# Patient Record
Sex: FEMALE | Race: WHITE | NOT HISPANIC OR LATINO | Employment: FULL TIME | URBAN - METROPOLITAN AREA
[De-identification: names, ages, dates, MRNs, and addresses within clinical notes are randomized per-mention and may not be internally consistent; named-entity substitution may affect disease eponyms.]

---

## 2017-02-08 ENCOUNTER — OFFICE VISIT - HEALTHEAST (OUTPATIENT)
Dept: FAMILY MEDICINE | Facility: CLINIC | Age: 42
End: 2017-02-08

## 2017-02-08 DIAGNOSIS — R10.13 EPIGASTRIC PAIN: ICD-10-CM

## 2017-02-08 DIAGNOSIS — Z72.0 TOBACCO USE: ICD-10-CM

## 2017-02-08 DIAGNOSIS — N64.4 BREAST PAIN: ICD-10-CM

## 2017-02-09 ENCOUNTER — COMMUNICATION - HEALTHEAST (OUTPATIENT)
Dept: FAMILY MEDICINE | Facility: CLINIC | Age: 42
End: 2017-02-09

## 2017-02-13 LAB
GLIADIN IGA SER-ACNC: 0.7 U/ML
GLIADIN IGG SER-ACNC: <0.4 U/ML
IGA SERPL-MCNC: 145 MG/DL (ref 65–400)
TTG IGA SER-ACNC: 0.5 U/ML
TTG IGG SER-ACNC: <0.6 U/ML

## 2017-02-24 ENCOUNTER — HOSPITAL ENCOUNTER (OUTPATIENT)
Dept: MAMMOGRAPHY | Facility: HOSPITAL | Age: 42
Discharge: HOME | End: 2017-02-24
Attending: FAMILY MEDICINE

## 2017-02-24 DIAGNOSIS — Z12.31 VISIT FOR SCREENING MAMMOGRAM: ICD-10-CM

## 2017-03-03 ENCOUNTER — HOSPITAL ENCOUNTER (OUTPATIENT)
Dept: MAMMOGRAPHY | Facility: HOSPITAL | Age: 42
Discharge: HOME | End: 2017-03-03
Attending: FAMILY MEDICINE

## 2017-03-03 DIAGNOSIS — R92.0 MICROCALCIFICATION OF LEFT BREAST ON MAMMOGRAM: ICD-10-CM

## 2017-03-06 ENCOUNTER — OFFICE VISIT - HEALTHEAST (OUTPATIENT)
Dept: FAMILY MEDICINE | Facility: CLINIC | Age: 42
End: 2017-03-06

## 2017-03-06 DIAGNOSIS — Z80.3 FAMILY HISTORY OF BREAST CANCER: ICD-10-CM

## 2017-03-06 DIAGNOSIS — R07.9 CHEST PAIN: ICD-10-CM

## 2017-03-06 DIAGNOSIS — F17.200 NICOTINE DEPENDENCE: ICD-10-CM

## 2017-03-08 ENCOUNTER — COMMUNICATION - HEALTHEAST (OUTPATIENT)
Dept: PULMONOLOGY | Facility: OTHER | Age: 42
End: 2017-03-08

## 2017-03-09 ENCOUNTER — COMMUNICATION - HEALTHEAST (OUTPATIENT)
Dept: ONCOLOGY | Facility: HOSPITAL | Age: 42
End: 2017-03-09

## 2017-03-13 ENCOUNTER — HOSPITAL ENCOUNTER (OUTPATIENT)
Dept: CARDIOLOGY | Facility: HOSPITAL | Age: 42
Discharge: HOME | End: 2017-03-13
Attending: FAMILY MEDICINE

## 2017-03-13 DIAGNOSIS — R07.9 CHEST PAIN: ICD-10-CM

## 2017-03-13 LAB
CV STRESS CURRENT BP HE: NORMAL
CV STRESS CURRENT HR HE: 100
CV STRESS CURRENT HR HE: 103
CV STRESS CURRENT HR HE: 106
CV STRESS CURRENT HR HE: 111
CV STRESS CURRENT HR HE: 113
CV STRESS CURRENT HR HE: 113
CV STRESS CURRENT HR HE: 114
CV STRESS CURRENT HR HE: 114
CV STRESS CURRENT HR HE: 118
CV STRESS CURRENT HR HE: 122
CV STRESS CURRENT HR HE: 135
CV STRESS CURRENT HR HE: 138
CV STRESS CURRENT HR HE: 142
CV STRESS CURRENT HR HE: 143
CV STRESS CURRENT HR HE: 155
CV STRESS CURRENT HR HE: 162
CV STRESS CURRENT HR HE: 163
CV STRESS CURRENT HR HE: 164
CV STRESS CURRENT HR HE: 66
CV STRESS CURRENT HR HE: 79
CV STRESS CURRENT HR HE: 82
CV STRESS CURRENT HR HE: 86
CV STRESS CURRENT HR HE: 87
CV STRESS CURRENT HR HE: 88
CV STRESS CURRENT HR HE: 89
CV STRESS CURRENT HR HE: 91
CV STRESS CURRENT HR HE: 94
CV STRESS CURRENT HR HE: 96
CV STRESS CURRENT HR HE: 97
CV STRESS CURRENT HR HE: 98
CV STRESS DEVIATION TIME HE: NORMAL
CV STRESS ECHO PERCENT HR HE: NORMAL
CV STRESS EXERCISE STAGE HE: NORMAL
CV STRESS FINAL RESTING BP HE: NORMAL
CV STRESS FINAL RESTING HR HE: 82
CV STRESS MAX HR HE: 164
CV STRESS MAX TREADMILL GRADE HE: 16
CV STRESS MAX TREADMILL SPEED HE: 4.2
CV STRESS PEAK DIA BP HE: NORMAL
CV STRESS PEAK SYS BP HE: NORMAL
CV STRESS PHASE HE: NORMAL
CV STRESS PROTOCOL HE: NORMAL
CV STRESS RESTING PT POSITION HE: NORMAL
CV STRESS RESTING PT POSITION HE: NORMAL
CV STRESS ST DEVIATION AMOUNT HE: NORMAL
CV STRESS ST DEVIATION ELEVATION HE: NORMAL
CV STRESS ST EVELATION AMOUNT HE: NORMAL
CV STRESS TEST TYPE HE: NORMAL
CV STRESS TOTAL STAGE TIME MIN 1 HE: NORMAL
STRESS ECHO BASELINE BP: NORMAL
STRESS ECHO BASELINE HR: 66
STRESS ECHO CALCULATED PERCENT HR: 91 %
STRESS ECHO LAST STRESS BP: NORMAL
STRESS ECHO LAST STRESS HR: 163
STRESS ECHO POST ESTIMATED WORKLOAD: 11.7
STRESS ECHO POST EXERCISE DUR MIN: 10
STRESS ECHO POST EXERCISE DUR SEC: 0
STRESS ECHO TARGET HR: 153

## 2017-03-15 ENCOUNTER — OFFICE VISIT - HEALTHEAST (OUTPATIENT)
Dept: PULMONOLOGY | Facility: OTHER | Age: 42
End: 2017-03-15

## 2017-03-15 DIAGNOSIS — R06.00 DYSPNEA: ICD-10-CM

## 2017-03-15 ASSESSMENT — MIFFLIN-ST. JEOR: SCORE: 1311.76

## 2017-04-03 ENCOUNTER — RECORDS - HEALTHEAST (OUTPATIENT)
Dept: ADMINISTRATIVE | Facility: OTHER | Age: 42
End: 2017-04-03

## 2017-04-03 ENCOUNTER — COMMUNICATION - HEALTHEAST (OUTPATIENT)
Dept: ONCOLOGY | Facility: HOSPITAL | Age: 42
End: 2017-04-03

## 2017-04-03 ENCOUNTER — OFFICE VISIT - HEALTHEAST (OUTPATIENT)
Dept: ONCOLOGY | Facility: HOSPITAL | Age: 42
End: 2017-04-03

## 2017-04-03 DIAGNOSIS — Z80.3 FAMILY HISTORY OF BREAST CANCER: ICD-10-CM

## 2017-04-07 ENCOUNTER — COMMUNICATION - HEALTHEAST (OUTPATIENT)
Dept: ONCOLOGY | Facility: HOSPITAL | Age: 42
End: 2017-04-07

## 2017-07-05 ENCOUNTER — OFFICE VISIT - HEALTHEAST (OUTPATIENT)
Dept: FAMILY MEDICINE | Facility: CLINIC | Age: 42
End: 2017-07-05

## 2017-07-05 ENCOUNTER — COMMUNICATION - HEALTHEAST (OUTPATIENT)
Dept: FAMILY MEDICINE | Facility: CLINIC | Age: 42
End: 2017-07-05

## 2017-07-05 DIAGNOSIS — G43.909 MIGRAINE: ICD-10-CM

## 2017-08-25 ENCOUNTER — COMMUNICATION - HEALTHEAST (OUTPATIENT)
Dept: FAMILY MEDICINE | Facility: CLINIC | Age: 42
End: 2017-08-25

## 2017-08-25 DIAGNOSIS — N64.4 BREAST PAIN: ICD-10-CM

## 2017-08-26 ENCOUNTER — COMMUNICATION - HEALTHEAST (OUTPATIENT)
Dept: FAMILY MEDICINE | Facility: CLINIC | Age: 42
End: 2017-08-26

## 2017-09-07 ENCOUNTER — OFFICE VISIT - HEALTHEAST (OUTPATIENT)
Dept: FAMILY MEDICINE | Facility: CLINIC | Age: 42
End: 2017-09-07

## 2017-09-07 DIAGNOSIS — F39 MOOD DISORDER (H): ICD-10-CM

## 2017-09-07 DIAGNOSIS — Z23 NEED FOR IMMUNIZATION AGAINST INFLUENZA: ICD-10-CM

## 2017-09-07 DIAGNOSIS — G47.00 INSOMNIA: ICD-10-CM

## 2017-09-07 DIAGNOSIS — F41.9 ANXIETY: ICD-10-CM

## 2017-10-02 ENCOUNTER — COMMUNICATION - HEALTHEAST (OUTPATIENT)
Dept: FAMILY MEDICINE | Facility: CLINIC | Age: 42
End: 2017-10-02

## 2017-10-03 ENCOUNTER — COMMUNICATION - HEALTHEAST (OUTPATIENT)
Dept: SCHEDULING | Facility: CLINIC | Age: 42
End: 2017-10-03

## 2017-10-03 DIAGNOSIS — F39 MOOD DISORDER (H): ICD-10-CM

## 2017-10-03 DIAGNOSIS — F41.9 ANXIETY: ICD-10-CM

## 2017-10-03 DIAGNOSIS — G47.00 INSOMNIA: ICD-10-CM

## 2017-10-19 ENCOUNTER — COMMUNICATION - HEALTHEAST (OUTPATIENT)
Dept: FAMILY MEDICINE | Facility: CLINIC | Age: 42
End: 2017-10-19

## 2017-10-19 DIAGNOSIS — F10.10 ALCOHOL ABUSE: ICD-10-CM

## 2017-11-06 ENCOUNTER — OFFICE VISIT - HEALTHEAST (OUTPATIENT)
Dept: FAMILY MEDICINE | Facility: CLINIC | Age: 42
End: 2017-11-06

## 2017-11-06 DIAGNOSIS — N92.6 IRREGULAR MENSES: ICD-10-CM

## 2017-11-08 ENCOUNTER — HOSPITAL ENCOUNTER (OUTPATIENT)
Dept: ULTRASOUND IMAGING | Facility: HOSPITAL | Age: 42
Discharge: HOME | End: 2017-11-08
Attending: FAMILY MEDICINE

## 2017-11-08 DIAGNOSIS — N92.6 IRREGULAR MENSES: ICD-10-CM

## 2017-11-15 ENCOUNTER — COMMUNICATION - HEALTHEAST (OUTPATIENT)
Dept: FAMILY MEDICINE | Facility: CLINIC | Age: 42
End: 2017-11-15

## 2017-11-15 DIAGNOSIS — N64.4 BREAST PAIN: ICD-10-CM

## 2017-11-15 DIAGNOSIS — F10.10 ALCOHOL ABUSE: ICD-10-CM

## 2017-12-12 ENCOUNTER — COMMUNICATION - HEALTHEAST (OUTPATIENT)
Dept: FAMILY MEDICINE | Facility: CLINIC | Age: 42
End: 2017-12-12

## 2017-12-12 DIAGNOSIS — G43.909 MIGRAINE: ICD-10-CM

## 2017-12-13 ENCOUNTER — RECORDS - HEALTHEAST (OUTPATIENT)
Dept: ADMINISTRATIVE | Facility: OTHER | Age: 42
End: 2017-12-13

## 2017-12-14 ENCOUNTER — COMMUNICATION - HEALTHEAST (OUTPATIENT)
Dept: FAMILY MEDICINE | Facility: CLINIC | Age: 42
End: 2017-12-14

## 2017-12-14 DIAGNOSIS — F10.10 ALCOHOL ABUSE: ICD-10-CM

## 2017-12-23 ENCOUNTER — COMMUNICATION - HEALTHEAST (OUTPATIENT)
Dept: FAMILY MEDICINE | Facility: CLINIC | Age: 42
End: 2017-12-23

## 2017-12-23 DIAGNOSIS — F39 MOOD DISORDER (H): ICD-10-CM

## 2017-12-23 DIAGNOSIS — F41.9 ANXIETY: ICD-10-CM

## 2017-12-23 DIAGNOSIS — G47.00 INSOMNIA: ICD-10-CM

## 2018-01-03 ENCOUNTER — OFFICE VISIT - HEALTHEAST (OUTPATIENT)
Dept: FAMILY MEDICINE | Facility: CLINIC | Age: 43
End: 2018-01-03

## 2018-01-03 DIAGNOSIS — F10.90 ALCOHOL USE DISORDER: ICD-10-CM

## 2018-01-03 DIAGNOSIS — R41.0 DELIRIUM: ICD-10-CM

## 2018-01-03 LAB
ALBUMIN SERPL-MCNC: 3.9 G/DL (ref 3.5–5)
ALP SERPL-CCNC: 55 U/L (ref 45–120)
ALT SERPL W P-5'-P-CCNC: 35 U/L (ref 0–45)
ANION GAP SERPL CALCULATED.3IONS-SCNC: 7 MMOL/L (ref 5–18)
AST SERPL W P-5'-P-CCNC: 36 U/L (ref 0–40)
BILIRUB SERPL-MCNC: 0.4 MG/DL (ref 0–1)
BUN SERPL-MCNC: 10 MG/DL (ref 8–22)
CALCIUM SERPL-MCNC: 9.9 MG/DL (ref 8.5–10.5)
CHLORIDE BLD-SCNC: 102 MMOL/L (ref 98–107)
CO2 SERPL-SCNC: 26 MMOL/L (ref 22–31)
CREAT SERPL-MCNC: 0.7 MG/DL (ref 0.6–1.1)
GFR SERPL CREATININE-BSD FRML MDRD: >60 ML/MIN/1.73M2
GLUCOSE BLD-MCNC: 77 MG/DL (ref 70–125)
POTASSIUM BLD-SCNC: 4.6 MMOL/L (ref 3.5–5)
PROT SERPL-MCNC: 7.3 G/DL (ref 6–8)
SODIUM SERPL-SCNC: 135 MMOL/L (ref 136–145)

## 2018-02-02 ENCOUNTER — COMMUNICATION - HEALTHEAST (OUTPATIENT)
Dept: FAMILY MEDICINE | Facility: CLINIC | Age: 43
End: 2018-02-02

## 2018-02-02 DIAGNOSIS — F10.90 ALCOHOL USE DISORDER: ICD-10-CM

## 2018-02-05 ENCOUNTER — OFFICE VISIT - HEALTHEAST (OUTPATIENT)
Dept: FAMILY MEDICINE | Facility: CLINIC | Age: 43
End: 2018-02-05

## 2018-02-05 ENCOUNTER — COMMUNICATION - HEALTHEAST (OUTPATIENT)
Dept: FAMILY MEDICINE | Facility: CLINIC | Age: 43
End: 2018-02-05

## 2018-02-05 DIAGNOSIS — F10.90 ALCOHOL USE DISORDER: ICD-10-CM

## 2018-02-05 DIAGNOSIS — G47.00 INSOMNIA: ICD-10-CM

## 2018-02-05 DIAGNOSIS — Z78.9 ALCOHOL USE: ICD-10-CM

## 2018-02-05 DIAGNOSIS — F41.9 ANXIETY: ICD-10-CM

## 2018-02-05 DIAGNOSIS — N92.6 IRREGULAR MENSES: ICD-10-CM

## 2018-02-05 DIAGNOSIS — G43.909 MIGRAINE: ICD-10-CM

## 2018-02-05 DIAGNOSIS — F39 MOOD DISORDER (H): ICD-10-CM

## 2018-02-13 ENCOUNTER — OFFICE VISIT - HEALTHEAST (OUTPATIENT)
Dept: FAMILY MEDICINE | Facility: CLINIC | Age: 43
End: 2018-02-13

## 2018-02-13 DIAGNOSIS — M79.10 MYALGIA: ICD-10-CM

## 2018-02-13 LAB
ALBUMIN SERPL-MCNC: 3.9 G/DL (ref 3.5–5)
ALP SERPL-CCNC: 60 U/L (ref 45–120)
ALT SERPL W P-5'-P-CCNC: 37 U/L (ref 0–45)
ANION GAP SERPL CALCULATED.3IONS-SCNC: 8 MMOL/L (ref 5–18)
AST SERPL W P-5'-P-CCNC: 35 U/L (ref 0–40)
BILIRUB SERPL-MCNC: 0.4 MG/DL (ref 0–1)
BUN SERPL-MCNC: 12 MG/DL (ref 8–22)
CALCIUM SERPL-MCNC: 9.3 MG/DL (ref 8.5–10.5)
CHLORIDE BLD-SCNC: 104 MMOL/L (ref 98–107)
CO2 SERPL-SCNC: 26 MMOL/L (ref 22–31)
CREAT SERPL-MCNC: 0.69 MG/DL (ref 0.6–1.1)
ERYTHROCYTE [DISTWIDTH] IN BLOOD BY AUTOMATED COUNT: 11 % (ref 11–14.5)
FOLATE SERPL-MCNC: 16.4 NG/ML
GFR SERPL CREATININE-BSD FRML MDRD: >60 ML/MIN/1.73M2
GLUCOSE BLD-MCNC: 108 MG/DL (ref 70–125)
HCT VFR BLD AUTO: 41.1 % (ref 35–47)
HGB BLD-MCNC: 13.7 G/DL (ref 12–16)
MAGNESIUM SERPL-MCNC: 2.3 MG/DL (ref 1.8–2.6)
MCH RBC QN AUTO: 32.5 PG (ref 27–34)
MCHC RBC AUTO-ENTMCNC: 33.2 G/DL (ref 32–36)
MCV RBC AUTO: 98 FL (ref 80–100)
PLATELET # BLD AUTO: 236 THOU/UL (ref 140–440)
PMV BLD AUTO: 7.3 FL (ref 7–10)
POTASSIUM BLD-SCNC: 4.3 MMOL/L (ref 3.5–5)
PROT SERPL-MCNC: 7.1 G/DL (ref 6–8)
RBC # BLD AUTO: 4.2 MILL/UL (ref 3.8–5.4)
SODIUM SERPL-SCNC: 138 MMOL/L (ref 136–145)
TSH SERPL DL<=0.005 MIU/L-ACNC: 0.76 UIU/ML (ref 0.3–5)
VIT B12 SERPL-MCNC: 458 PG/ML (ref 213–816)
WBC: 4.3 THOU/UL (ref 4–11)

## 2018-02-17 LAB — VIT B1 PYROPHOSHATE BLD-SCNC: 118 NMOL/L (ref 70–180)

## 2018-02-27 ENCOUNTER — OFFICE VISIT - HEALTHEAST (OUTPATIENT)
Dept: FAMILY MEDICINE | Facility: CLINIC | Age: 43
End: 2018-02-27

## 2018-02-27 DIAGNOSIS — B37.31 YEAST VAGINITIS: ICD-10-CM

## 2018-02-27 DIAGNOSIS — N92.6 ABNORMAL MENSES: ICD-10-CM

## 2018-02-27 DIAGNOSIS — Z12.4 CERVICAL CANCER SCREENING: ICD-10-CM

## 2018-02-27 DIAGNOSIS — N93.0 POSTCOITAL BLEEDING: ICD-10-CM

## 2018-02-27 LAB
CLUE CELLS: ABNORMAL
TRICHOMONAS, WET PREP: ABNORMAL
YEAST, WET PREP: ABNORMAL

## 2018-02-28 LAB
C TRACH DNA SPEC QL PROBE+SIG AMP: NEGATIVE
HPV SOURCE: ABNORMAL
HUMAN PAPILLOMA VIRUS 16 DNA: NEGATIVE
HUMAN PAPILLOMA VIRUS 18 DNA: NEGATIVE
HUMAN PAPILLOMA VIRUS FINAL DIAGNOSIS: ABNORMAL
HUMAN PAPILLOMA VIRUS OTHER HR: POSITIVE
N GONORRHOEA DNA SPEC QL NAA+PROBE: NEGATIVE
SPECIMEN DESCRIPTION: ABNORMAL

## 2018-03-04 ENCOUNTER — COMMUNICATION - HEALTHEAST (OUTPATIENT)
Dept: FAMILY MEDICINE | Facility: CLINIC | Age: 43
End: 2018-03-04

## 2018-03-04 DIAGNOSIS — F41.9 ANXIETY: ICD-10-CM

## 2018-03-04 DIAGNOSIS — Z78.9 ALCOHOL USE: ICD-10-CM

## 2018-03-04 DIAGNOSIS — G47.00 INSOMNIA: ICD-10-CM

## 2018-03-05 LAB
BKR LAB AP ABNORMAL BLEEDING: YES
BKR LAB AP BIRTH CONTROL/HORMONES: NORMAL
BKR LAB AP CERVICAL APPEARANCE: NORMAL
BKR LAB AP GYN ADEQUACY: NORMAL
BKR LAB AP GYN INTERPRETATION: NORMAL
BKR LAB AP HPV REFLEX: NORMAL
BKR LAB AP LMP: NORMAL
BKR LAB AP PATIENT STATUS: NORMAL
BKR LAB AP PREVIOUS ABNORMAL: NORMAL
BKR LAB AP PREVIOUS NORMAL: 2012
HIGH RISK?: NO
PATH REPORT.COMMENTS IMP SPEC: NORMAL
RESULT FLAG (HE HISTORICAL CONVERSION): NORMAL

## 2018-03-14 ENCOUNTER — RECORDS - HEALTHEAST (OUTPATIENT)
Dept: ADMINISTRATIVE | Facility: OTHER | Age: 43
End: 2018-03-14

## 2018-03-14 ENCOUNTER — COMMUNICATION - HEALTHEAST (OUTPATIENT)
Dept: FAMILY MEDICINE | Facility: CLINIC | Age: 43
End: 2018-03-14

## 2018-03-15 ENCOUNTER — AMBULATORY - HEALTHEAST (OUTPATIENT)
Dept: FAMILY MEDICINE | Facility: CLINIC | Age: 43
End: 2018-03-15

## 2018-03-15 DIAGNOSIS — Z78.9 ALCOHOL USE: ICD-10-CM

## 2018-03-15 DIAGNOSIS — F41.9 ANXIETY: ICD-10-CM

## 2018-04-03 ENCOUNTER — COMMUNICATION - HEALTHEAST (OUTPATIENT)
Dept: FAMILY MEDICINE | Facility: CLINIC | Age: 43
End: 2018-04-03

## 2018-04-03 DIAGNOSIS — F10.90 ALCOHOL USE DISORDER: ICD-10-CM

## 2018-04-16 ENCOUNTER — COMMUNICATION - HEALTHEAST (OUTPATIENT)
Dept: FAMILY MEDICINE | Facility: CLINIC | Age: 43
End: 2018-04-16

## 2018-04-16 DIAGNOSIS — F39 MOOD DISORDER (H): ICD-10-CM

## 2018-04-16 DIAGNOSIS — F41.9 ANXIETY: ICD-10-CM

## 2018-04-19 ENCOUNTER — OFFICE VISIT - HEALTHEAST (OUTPATIENT)
Dept: FAMILY MEDICINE | Facility: CLINIC | Age: 43
End: 2018-04-19

## 2018-04-19 DIAGNOSIS — F32.A DEPRESSION: ICD-10-CM

## 2018-04-19 DIAGNOSIS — Z78.9 ALCOHOL USE: ICD-10-CM

## 2018-04-19 DIAGNOSIS — F10.10 ETOH ABUSE: ICD-10-CM

## 2018-04-19 DIAGNOSIS — R30.9 PAINFUL URINATION: ICD-10-CM

## 2018-04-19 DIAGNOSIS — F41.9 ANXIETY: ICD-10-CM

## 2018-04-19 DIAGNOSIS — N92.0 MENORRHAGIA: ICD-10-CM

## 2018-04-19 LAB
BACTERIA #/AREA URNS HPF: ABNORMAL HPF
RBC #/AREA URNS AUTO: ABNORMAL HPF
SQUAMOUS #/AREA URNS AUTO: ABNORMAL LPF
WBC #/AREA URNS AUTO: ABNORMAL HPF
WBC CLUMPS #/AREA URNS HPF: PRESENT /[HPF]

## 2018-04-21 LAB — BACTERIA SPEC CULT: ABNORMAL

## 2018-05-03 ENCOUNTER — OFFICE VISIT - HEALTHEAST (OUTPATIENT)
Dept: FAMILY MEDICINE | Facility: CLINIC | Age: 43
End: 2018-05-03

## 2018-05-03 DIAGNOSIS — N10 ACUTE PYELONEPHRITIS: ICD-10-CM

## 2018-05-03 DIAGNOSIS — Z09 HOSPITAL DISCHARGE FOLLOW-UP: ICD-10-CM

## 2018-05-07 ENCOUNTER — COMMUNICATION - HEALTHEAST (OUTPATIENT)
Dept: FAMILY MEDICINE | Facility: CLINIC | Age: 43
End: 2018-05-07

## 2018-05-07 DIAGNOSIS — F10.90 ALCOHOL USE DISORDER: ICD-10-CM

## 2018-05-18 ENCOUNTER — COMMUNICATION - HEALTHEAST (OUTPATIENT)
Dept: FAMILY MEDICINE | Facility: CLINIC | Age: 43
End: 2018-05-18

## 2018-05-18 ENCOUNTER — AMBULATORY - HEALTHEAST (OUTPATIENT)
Dept: LAB | Facility: CLINIC | Age: 43
End: 2018-05-18

## 2018-05-18 DIAGNOSIS — N39.0 UTI (URINARY TRACT INFECTION): ICD-10-CM

## 2018-05-19 LAB — BACTERIA SPEC CULT: NO GROWTH

## 2018-06-06 ENCOUNTER — HOSPITAL ENCOUNTER (OUTPATIENT)
Dept: MAMMOGRAPHY | Facility: CLINIC | Age: 43
Discharge: HOME | End: 2018-06-06
Attending: FAMILY MEDICINE

## 2018-06-06 DIAGNOSIS — Z12.31 VISIT FOR SCREENING MAMMOGRAM: ICD-10-CM

## 2018-07-09 ENCOUNTER — COMMUNICATION - HEALTHEAST (OUTPATIENT)
Dept: FAMILY MEDICINE | Facility: CLINIC | Age: 43
End: 2018-07-09

## 2018-07-09 DIAGNOSIS — G43.909 MIGRAINE: ICD-10-CM

## 2018-07-09 DIAGNOSIS — F10.90 ALCOHOL USE DISORDER: ICD-10-CM

## 2018-07-13 ENCOUNTER — COMMUNICATION - HEALTHEAST (OUTPATIENT)
Dept: FAMILY MEDICINE | Facility: CLINIC | Age: 43
End: 2018-07-13

## 2018-07-13 DIAGNOSIS — G47.00 INSOMNIA: ICD-10-CM

## 2018-07-15 ENCOUNTER — COMMUNICATION - HEALTHEAST (OUTPATIENT)
Dept: FAMILY MEDICINE | Facility: CLINIC | Age: 43
End: 2018-07-15

## 2018-07-15 DIAGNOSIS — F10.90 ALCOHOL USE DISORDER: ICD-10-CM

## 2018-07-16 RX ORDER — SUMATRIPTAN 50 MG/1
TABLET, FILM COATED ORAL
Qty: 10 TABLET | Refills: 2 | Status: SHIPPED | OUTPATIENT
Start: 2018-07-16 | End: 2022-08-30

## 2018-08-12 ENCOUNTER — COMMUNICATION - HEALTHEAST (OUTPATIENT)
Dept: FAMILY MEDICINE | Facility: CLINIC | Age: 43
End: 2018-08-12

## 2018-08-12 DIAGNOSIS — G43.909 MIGRAINE: ICD-10-CM

## 2018-08-24 ENCOUNTER — COMMUNICATION - HEALTHEAST (OUTPATIENT)
Dept: FAMILY MEDICINE | Facility: CLINIC | Age: 43
End: 2018-08-24

## 2018-08-30 ENCOUNTER — COMMUNICATION - HEALTHEAST (OUTPATIENT)
Dept: FAMILY MEDICINE | Facility: CLINIC | Age: 43
End: 2018-08-30

## 2018-09-05 ENCOUNTER — OFFICE VISIT - HEALTHEAST (OUTPATIENT)
Dept: FAMILY MEDICINE | Facility: CLINIC | Age: 43
End: 2018-09-05

## 2018-09-05 DIAGNOSIS — J32.9 SINUSITIS: ICD-10-CM

## 2018-09-05 DIAGNOSIS — Z72.0 TOBACCO USE: ICD-10-CM

## 2018-09-05 DIAGNOSIS — F33.0 MILD RECURRENT MAJOR DEPRESSION (H): ICD-10-CM

## 2018-09-13 ENCOUNTER — COMMUNICATION - HEALTHEAST (OUTPATIENT)
Dept: FAMILY MEDICINE | Facility: CLINIC | Age: 43
End: 2018-09-13

## 2018-09-13 DIAGNOSIS — K29.70 GASTRITIS: ICD-10-CM

## 2018-09-13 DIAGNOSIS — H92.09 EAR PAIN: ICD-10-CM

## 2018-09-21 ENCOUNTER — OFFICE VISIT - HEALTHEAST (OUTPATIENT)
Dept: FAMILY MEDICINE | Facility: CLINIC | Age: 43
End: 2018-09-21

## 2018-09-21 ENCOUNTER — AMBULATORY - HEALTHEAST (OUTPATIENT)
Dept: FAMILY MEDICINE | Facility: CLINIC | Age: 43
End: 2018-09-21

## 2018-09-21 ENCOUNTER — HOSPITAL ENCOUNTER (OUTPATIENT)
Dept: ULTRASOUND IMAGING | Facility: CLINIC | Age: 43
Discharge: HOME | End: 2018-09-21
Attending: FAMILY MEDICINE

## 2018-09-21 DIAGNOSIS — E04.1 THYROID NODULE: ICD-10-CM

## 2018-09-21 DIAGNOSIS — J01.00 SUBACUTE MAXILLARY SINUSITIS: ICD-10-CM

## 2018-09-21 LAB — TSH SERPL DL<=0.005 MIU/L-ACNC: 1.47 UIU/ML (ref 0.3–5)

## 2018-09-21 RX ORDER — ALBUTEROL SULFATE 90 UG/1
AEROSOL, METERED RESPIRATORY (INHALATION)
Status: SHIPPED | COMMUNITY
Start: 2018-09-19 | End: 2022-08-30

## 2018-09-24 ENCOUNTER — COMMUNICATION - HEALTHEAST (OUTPATIENT)
Dept: FAMILY MEDICINE | Facility: CLINIC | Age: 43
End: 2018-09-24

## 2018-09-24 ENCOUNTER — COMMUNICATION - HEALTHEAST (OUTPATIENT)
Dept: ADMINISTRATIVE | Facility: CLINIC | Age: 43
End: 2018-09-24

## 2018-09-24 DIAGNOSIS — E04.1 THYROID NODULE: ICD-10-CM

## 2018-09-27 ENCOUNTER — OFFICE VISIT - HEALTHEAST (OUTPATIENT)
Dept: FAMILY MEDICINE | Facility: CLINIC | Age: 43
End: 2018-09-27

## 2018-09-27 ENCOUNTER — HOSPITAL ENCOUNTER (OUTPATIENT)
Dept: ULTRASOUND IMAGING | Facility: HOSPITAL | Age: 43
Discharge: HOME | End: 2018-09-27
Attending: FAMILY MEDICINE | Admitting: RADIOLOGY

## 2018-09-27 DIAGNOSIS — R91.1 LUNG NODULE: ICD-10-CM

## 2018-09-27 DIAGNOSIS — R06.09 EXERTIONAL DYSPNEA: ICD-10-CM

## 2018-09-27 DIAGNOSIS — E04.1 THYROID NODULE: ICD-10-CM

## 2018-09-27 DIAGNOSIS — F17.210 CIGARETTE SMOKER: ICD-10-CM

## 2018-10-01 ENCOUNTER — OFFICE VISIT - HEALTHEAST (OUTPATIENT)
Dept: AUDIOLOGY | Facility: CLINIC | Age: 43
End: 2018-10-01

## 2018-10-01 ENCOUNTER — OFFICE VISIT - HEALTHEAST (OUTPATIENT)
Dept: OTOLARYNGOLOGY | Facility: CLINIC | Age: 43
End: 2018-10-01

## 2018-10-01 DIAGNOSIS — H65.90 SEROUS OTITIS MEDIA: ICD-10-CM

## 2018-10-01 DIAGNOSIS — H90.11 CONDUCTIVE HEARING LOSS OF RIGHT EAR WITH UNRESTRICTED HEARING OF LEFT EAR: ICD-10-CM

## 2018-10-02 ENCOUNTER — AMBULATORY - HEALTHEAST (OUTPATIENT)
Dept: FAMILY MEDICINE | Facility: CLINIC | Age: 43
End: 2018-10-02

## 2018-10-02 ENCOUNTER — COMMUNICATION - HEALTHEAST (OUTPATIENT)
Dept: FAMILY MEDICINE | Facility: CLINIC | Age: 43
End: 2018-10-02

## 2018-10-02 DIAGNOSIS — C73 THYROID CANCER (H): ICD-10-CM

## 2018-10-03 ENCOUNTER — COMMUNICATION - HEALTHEAST (OUTPATIENT)
Dept: FAMILY MEDICINE | Facility: CLINIC | Age: 43
End: 2018-10-03

## 2018-10-03 ENCOUNTER — COMMUNICATION - HEALTHEAST (OUTPATIENT)
Dept: ADMINISTRATIVE | Facility: CLINIC | Age: 43
End: 2018-10-03

## 2018-10-08 ENCOUNTER — RECORDS - HEALTHEAST (OUTPATIENT)
Dept: ADMINISTRATIVE | Facility: OTHER | Age: 43
End: 2018-10-08

## 2018-10-17 ENCOUNTER — RECORDS - HEALTHEAST (OUTPATIENT)
Dept: ADMINISTRATIVE | Facility: OTHER | Age: 43
End: 2018-10-17

## 2018-10-18 ENCOUNTER — COMMUNICATION - HEALTHEAST (OUTPATIENT)
Dept: FAMILY MEDICINE | Facility: CLINIC | Age: 43
End: 2018-10-18

## 2018-10-22 ENCOUNTER — OFFICE VISIT - HEALTHEAST (OUTPATIENT)
Dept: FAMILY MEDICINE | Facility: CLINIC | Age: 43
End: 2018-10-22

## 2018-10-22 DIAGNOSIS — F17.210 SMOKES CIGARETTES: ICD-10-CM

## 2018-10-22 DIAGNOSIS — C73 THYROID CANCER (H): ICD-10-CM

## 2018-10-22 DIAGNOSIS — F10.10 ETOH ABUSE: ICD-10-CM

## 2018-10-22 DIAGNOSIS — Z01.818 PREOP GENERAL PHYSICAL EXAM: ICD-10-CM

## 2018-10-22 LAB
HCG UR QL: NEGATIVE
HGB BLD-MCNC: 12.9 G/DL (ref 12–16)
SP GR UR STRIP: 1.02 (ref 1–1.03)

## 2018-10-22 ASSESSMENT — MIFFLIN-ST. JEOR: SCORE: 1324.47

## 2018-10-29 ENCOUNTER — TRANSFERRED RECORDS (OUTPATIENT)
Dept: HEALTH INFORMATION MANAGEMENT | Facility: CLINIC | Age: 43
End: 2018-10-29

## 2018-11-03 ENCOUNTER — COMMUNICATION - HEALTHEAST (OUTPATIENT)
Dept: FAMILY MEDICINE | Facility: CLINIC | Age: 43
End: 2018-11-03

## 2018-11-03 DIAGNOSIS — G47.00 INSOMNIA: ICD-10-CM

## 2018-11-05 ENCOUNTER — RECORDS - HEALTHEAST (OUTPATIENT)
Dept: ADMINISTRATIVE | Facility: OTHER | Age: 43
End: 2018-11-05

## 2018-11-05 RX ORDER — TRAZODONE HYDROCHLORIDE 50 MG/1
TABLET, FILM COATED ORAL
Qty: 90 TABLET | Refills: 0 | Status: SHIPPED | OUTPATIENT
Start: 2018-11-05 | End: 2022-08-30 | Stop reason: SINTOL

## 2018-11-06 RX ORDER — TRAMADOL HYDROCHLORIDE 50 MG/1
50 TABLET ORAL EVERY 8 HOURS PRN
Qty: 30 TABLET | Refills: 0 | Status: SHIPPED | OUTPATIENT
Start: 2018-11-06 | End: 2022-08-30

## 2018-11-14 ENCOUNTER — COMMUNICATION - HEALTHEAST (OUTPATIENT)
Dept: FAMILY MEDICINE | Facility: CLINIC | Age: 43
End: 2018-11-14

## 2018-11-14 DIAGNOSIS — K29.70 GASTRITIS: ICD-10-CM

## 2018-11-19 ENCOUNTER — RECORDS - HEALTHEAST (OUTPATIENT)
Dept: ADMINISTRATIVE | Facility: OTHER | Age: 43
End: 2018-11-19

## 2018-12-10 ENCOUNTER — COMMUNICATION - HEALTHEAST (OUTPATIENT)
Dept: FAMILY MEDICINE | Facility: CLINIC | Age: 43
End: 2018-12-10

## 2018-12-10 DIAGNOSIS — C73 THYROID CANCER (H): ICD-10-CM

## 2018-12-10 DIAGNOSIS — Z78.9 ALCOHOL USE: ICD-10-CM

## 2018-12-12 ENCOUNTER — COMMUNICATION - HEALTHEAST (OUTPATIENT)
Dept: FAMILY MEDICINE | Facility: CLINIC | Age: 43
End: 2018-12-12

## 2018-12-12 DIAGNOSIS — G43.909 MIGRAINE: ICD-10-CM

## 2018-12-18 ENCOUNTER — COMMUNICATION - HEALTHEAST (OUTPATIENT)
Dept: ADMINISTRATIVE | Facility: CLINIC | Age: 43
End: 2018-12-18

## 2018-12-18 ENCOUNTER — COMMUNICATION - HEALTHEAST (OUTPATIENT)
Dept: FAMILY MEDICINE | Facility: CLINIC | Age: 43
End: 2018-12-18

## 2019-02-08 ENCOUNTER — RECORDS - HEALTHEAST (OUTPATIENT)
Dept: ADMINISTRATIVE | Facility: OTHER | Age: 44
End: 2019-02-08

## 2019-02-20 ENCOUNTER — COMMUNICATION - HEALTHEAST (OUTPATIENT)
Dept: FAMILY MEDICINE | Facility: CLINIC | Age: 44
End: 2019-02-20

## 2019-02-22 ENCOUNTER — RECORDS - HEALTHEAST (OUTPATIENT)
Dept: ADMINISTRATIVE | Facility: OTHER | Age: 44
End: 2019-02-22

## 2019-03-06 ENCOUNTER — COMMUNICATION - HEALTHEAST (OUTPATIENT)
Dept: FAMILY MEDICINE | Facility: CLINIC | Age: 44
End: 2019-03-06

## 2019-03-06 DIAGNOSIS — G43.909 MIGRAINE: ICD-10-CM

## 2019-03-11 RX ORDER — SUMATRIPTAN 50 MG/1
TABLET, FILM COATED ORAL
Qty: 10 TABLET | Refills: 2 | Status: SHIPPED | OUTPATIENT
Start: 2019-03-11

## 2019-03-12 ENCOUNTER — COMMUNICATION - HEALTHEAST (OUTPATIENT)
Dept: FAMILY MEDICINE | Facility: CLINIC | Age: 44
End: 2019-03-12

## 2019-03-12 DIAGNOSIS — Z78.9 ALCOHOL USE: ICD-10-CM

## 2019-03-13 RX ORDER — DISULFIRAM 250 MG/1
250 TABLET ORAL DAILY
Qty: 90 TABLET | Refills: 0 | Status: SHIPPED | OUTPATIENT
Start: 2019-03-13 | End: 2022-08-30

## 2019-04-02 ENCOUNTER — RECORDS - HEALTHEAST (OUTPATIENT)
Dept: ADMINISTRATIVE | Facility: OTHER | Age: 44
End: 2019-04-02

## 2019-04-08 ENCOUNTER — COMMUNICATION - HEALTHEAST (OUTPATIENT)
Dept: FAMILY MEDICINE | Facility: CLINIC | Age: 44
End: 2019-04-08

## 2019-04-08 DIAGNOSIS — Z78.9 ALCOHOL USE: ICD-10-CM

## 2019-04-08 DIAGNOSIS — F41.9 ANXIETY: ICD-10-CM

## 2019-05-23 ENCOUNTER — RECORDS - HEALTHEAST (OUTPATIENT)
Dept: ADMINISTRATIVE | Facility: OTHER | Age: 44
End: 2019-05-23

## 2019-05-24 ENCOUNTER — RECORDS - HEALTHEAST (OUTPATIENT)
Dept: ADMINISTRATIVE | Facility: OTHER | Age: 44
End: 2019-05-24

## 2019-07-31 ENCOUNTER — COMMUNICATION - HEALTHEAST (OUTPATIENT)
Dept: FAMILY MEDICINE | Facility: CLINIC | Age: 44
End: 2019-07-31

## 2019-07-31 DIAGNOSIS — K29.70 GASTRITIS: ICD-10-CM

## 2019-09-26 ENCOUNTER — RECORDS - HEALTHEAST (OUTPATIENT)
Dept: ADMINISTRATIVE | Facility: OTHER | Age: 44
End: 2019-09-26

## 2019-10-05 ENCOUNTER — COMMUNICATION - HEALTHEAST (OUTPATIENT)
Dept: FAMILY MEDICINE | Facility: CLINIC | Age: 44
End: 2019-10-05

## 2019-10-05 DIAGNOSIS — Z78.9 ALCOHOL USE: ICD-10-CM

## 2019-10-05 DIAGNOSIS — F41.9 ANXIETY: ICD-10-CM

## 2019-10-18 ENCOUNTER — COMMUNICATION - HEALTHEAST (OUTPATIENT)
Dept: FAMILY MEDICINE | Facility: CLINIC | Age: 44
End: 2019-10-18

## 2019-10-18 DIAGNOSIS — F41.9 ANXIETY: ICD-10-CM

## 2019-10-18 DIAGNOSIS — Z78.9 ALCOHOL USE: ICD-10-CM

## 2019-10-25 ENCOUNTER — COMMUNICATION - HEALTHEAST (OUTPATIENT)
Dept: FAMILY MEDICINE | Facility: CLINIC | Age: 44
End: 2019-10-25

## 2019-10-25 DIAGNOSIS — K29.70 GASTRITIS: ICD-10-CM

## 2020-01-11 ENCOUNTER — COMMUNICATION - HEALTHEAST (OUTPATIENT)
Dept: FAMILY MEDICINE | Facility: CLINIC | Age: 45
End: 2020-01-11

## 2020-01-11 DIAGNOSIS — F41.9 ANXIETY: ICD-10-CM

## 2020-01-11 DIAGNOSIS — Z78.9 ALCOHOL USE: ICD-10-CM

## 2020-01-14 RX ORDER — GABAPENTIN 300 MG/1
CAPSULE ORAL
Qty: 270 CAPSULE | Refills: 0 | Status: SHIPPED | OUTPATIENT
Start: 2020-01-14 | End: 2022-10-11

## 2020-01-17 ENCOUNTER — COMMUNICATION - HEALTHEAST (OUTPATIENT)
Dept: FAMILY MEDICINE | Facility: CLINIC | Age: 45
End: 2020-01-17

## 2020-01-17 DIAGNOSIS — Z30.41 ENCOUNTER FOR SURVEILLANCE OF CONTRACEPTIVE PILLS: ICD-10-CM

## 2020-02-10 ENCOUNTER — TRANSFERRED RECORDS (OUTPATIENT)
Dept: HEALTH INFORMATION MANAGEMENT | Facility: CLINIC | Age: 45
End: 2020-02-10

## 2020-02-21 ENCOUNTER — TRANSFERRED RECORDS (OUTPATIENT)
Dept: HEALTH INFORMATION MANAGEMENT | Facility: CLINIC | Age: 45
End: 2020-02-21

## 2020-03-04 ENCOUNTER — TRANSFERRED RECORDS (OUTPATIENT)
Dept: HEALTH INFORMATION MANAGEMENT | Facility: CLINIC | Age: 45
End: 2020-03-04

## 2020-03-06 ENCOUNTER — TRANSFERRED RECORDS (OUTPATIENT)
Dept: HEALTH INFORMATION MANAGEMENT | Facility: CLINIC | Age: 45
End: 2020-03-06

## 2020-03-17 ENCOUNTER — TRANSCRIBE ORDERS (OUTPATIENT)
Dept: OTHER | Age: 45
End: 2020-03-17

## 2020-03-17 DIAGNOSIS — C73 MALIGNANT NEOPLASM OF THYROID GLAND (H): Primary | ICD-10-CM

## 2020-03-18 NOTE — TELEPHONE ENCOUNTER
ONCOLOGY INTAKE: Records Information      APPT INFORMATION:  Referring provider:  Dr. Moshe Munguia  Referring provider s clinic:  [facility]  Reason for visit/diagnosis:  Malignant Neoplasm of Thyroid Gland  Has patient been notified of appointment date and time?: Yes    RECORDS INFORMATION:  Were the records received with the referral (via [employer])? Yes    Has patient been seen for any external appt for this diagnosis? Yes    If yes, where? [facility]    Has patient had any imaging or procedures outside of [place] for this condition? Yes      If Yes, where? [facility]    ADDITIONAL INFORMATION:  None

## 2020-03-18 NOTE — TELEPHONE ENCOUNTER
FUTURE VISIT INFORMATION      FUTURE VISIT INFORMATION:    Date: 3/25/20    Time: 9:20 AM    Location: [facility]  REFERRAL INFORMATION:    Referring provider:  Dr. Moshe Munguia    Referring providers clinic:  [facility]    Reason for visit/diagnosis: Malignant Neoplasm of Thyroid Gland    RECORDS REQUESTED FROM:       Clinic name Comments Records Status Imaging Status   [facility] 3/16/20 - OV with Dr. Munguia 3/18 Sent to Scan    [facility] 2/[facility] - ENDO OV with Dr. Duckworth  19 - OV with Dr. Cruz Care Everywhere    [facility] - Surgery 10/29/18 - OP Note for TOTAL THYROIDECTOMY with Dr. Moshe Munguia Care Everywhere    [facility] - Imaging 3/6/20 - CT Neck Soft Tissue W  3/5/20 - NM Thyroid Whole Body  20 - US Thyroid FNA  20 - US Thyroid/Parathyroid  19 - NM Thyroid Whole Body  19 - NM Thyroid Care Everywhere 3/18 Req - In PACs   [facility] - Pathology 20 - Thyroid FNA Biopsy (Case: [id number])    [facility] 3/19 Sent to be reviewed    [facility] 10/22/18 - PCC OV with Dr. Shannan Cervantes  18 - PCC OV with Dr. Zeus Vargas Everywhere    [facility] - Imaging 18 - US Thyroid Biopsy Care Everywhere 3/18 Req - In PACs   [facility] - Pathology 18 - Thyroid Biopsy (Case: [id number])    [facility] 3/20 Sent to be reviewed            * 3/18/20 12:05 PM Faxed req to [facility] and  for images and Pathology - [place]  * 3/19/20 12:22 PM Received Pathology from [facility] and sent to be reviewed with filled out pathology form - [facility]  * 3/20/20 2:02 PM Received Pathology slides from  and sent to be reviewed with filled out pathology form - [facility]

## 2020-03-20 PROCEDURE — 00000346 ZZHCL STATISTIC REVIEW OUTSIDE SLIDES TC 88321: Performed by: OTOLARYNGOLOGY

## 2020-03-23 LAB — COPATH REPORT: NORMAL

## 2020-03-24 LAB — COPATH REPORT: NORMAL

## 2020-03-24 PROCEDURE — 00000346 ZZHCL STATISTIC REVIEW OUTSIDE SLIDES TC 88321: Performed by: OTOLARYNGOLOGY

## 2020-03-25 ENCOUNTER — PRE VISIT (OUTPATIENT)
Dept: OTOLARYNGOLOGY | Facility: CLINIC | Age: 45
End: 2020-03-25

## 2020-03-25 ENCOUNTER — VIRTUAL VISIT (OUTPATIENT)
Dept: OTOLARYNGOLOGY | Facility: CLINIC | Age: 45
End: 2020-03-25
Attending: OTOLARYNGOLOGY
Payer: COMMERCIAL

## 2020-03-25 DIAGNOSIS — C73 MALIGNANT NEOPLASM OF THYROID GLAND (H): ICD-10-CM

## 2020-03-25 DIAGNOSIS — C73 PAPILLARY THYROID CARCINOMA (H): Primary | ICD-10-CM

## 2020-03-25 NOTE — PROGRESS NOTES
Dear Dr. Munguia:    I had the pleasure of meeting Joan Christianson in consultation today at the [facility] at your request.     History of Present Illness:   Joan Christianson is a 44-year-old woman who is referred for evaluation of recurrent papillary thyroid carcinoma.  The patient has a history of papillary thyroid cancer that was treated with a total thyroidectomy locally by Dr. Munguia in October 2018.  Pathology from that surgery demonstrated a focal papillary thyroid carcinoma with 2 foci measuring 0.1 cm and 1.3 cm.  The tumor extended to the perithyroidal adipose tissue microscopically and the inked posterior surgical margin.  There was no lymphatic or vascular invasion and the right superior parathyroid was included in the specimen.  There was a level 6 metastatic node measuring 2.4 cm with extranodal extension.  There was no malignancy in the left lobe.  Per review of the operative note the remaining 3 parathyroid glands were preserved.  She had postoperative radioactive iodine for total of 50 mCi performed locally.  Her postoperative thyroid scan was negative for residual disease.  She has been followed by endocrinology with labs including thyroglobulin and thyroglobulin antibodies.  On chest imaging performed in 2019 she was incidentally noted to have possible disease in the thyroid bed.  She has also been noted to have increasing thyroglobulin levels with most recent lab in March 2020 at 11.  She has undergone work-up including a CT neck and a thyroid scan.  She was found to have recurrent disease in the right thyroid bed as well as concerns for metastatic disease in the right neck.  She underwent FNA of the right lateral neck, left lateral neck, right thyroid bed.  The right neck and right thyroid bed were positive for metastatic papillary thyroid carcinoma.  She was referred to the [facility] for definitive management.    In discussion with the patient today she says  that she is not sure that she notices the mass.  The only symptom she is aware of is she has intermittent tingling of her right arm.  She is eating without difficulty and has no swallowing issues.  She says that currently her voice is normal although she did have issues for several weeks with her voice after the surgery which resolved.  She did have some weight gain of about 10 pounds with adjustment of her thyroid medications but now has had a steady weight.  She does endorse being more physically active to help maintain the weight.  She does have occasional sweating and heat intolerance.      Past medical history: Denies    Past surgical history: Tonsillectomy in childhood, total thyroidectomy in October 2018    Social history: Smoker of 1/2 pack/day for about 20 years, quit in 2018.  She works as a  for undergraduate students.    Family history: Negative for thyroid cancers      MEDICATIONS:     No current outpatient medications on file.       ALLERGIES:  Allergies not on file    HABITS/SOCIAL HISTORY:   Smoker of 1/2 pack/day for about 20 years, quit in 2018.    She works as a  for undergraduate students.    Social History     Socioeconomic History     Marital status:      Spouse name: Not on file     Number of children: Not on file     Years of education: Not on file     Highest education level: Not on file   Occupational History     Not on file   Social Needs     Financial resource strain: Not on file     Food insecurity     Worry: Not on file     Inability: Not on file     Transportation needs     Medical: Not on file     Non-medical: Not on file   Tobacco Use     Smoking status: Not on file   Substance and Sexual Activity     Alcohol use: Not on file     Drug use: Not on file     Sexual activity: Not on file   Lifestyle     Physical activity     Days per week: Not on file     Minutes per session: Not on file     Stress: Not on file   Relationships     Social  connections     Talks on phone: Not on file     Gets together: Not on file     Attends [employer] service: Not on file     Active member of club or organization: Not on file     Attends meetings of clubs or organizations: Not on file     Relationship status: Not on file     Intimate partner violence     Fear of current or ex partner: Not on file     Emotionally abused: Not on file     Physically abused: Not on file     Forced sexual activity: Not on file   Other Topics Concern     Not on file   Social History Narrative     Not on file       PAST MEDICAL HISTORY: No past medical history on file.     PAST SURGICAL HISTORY: No past surgical history on file.    FAMILY HISTORY:  No family history on file.    REVIEW OF SYSTEMS:  12 point ROS was negative other than the symptoms noted above in the HPI.  Patient Supplied Answers to Review of Systems  No flowsheet data found.      PHYSICAL EXAMINATION:   There were no vitals taken for this visit.   Due to the coronavirus pandemic no physical exam was able to be performed as this was a telephone encounter.  The patient's voice was strong by phone without any obvious breathiness or hoarseness  Her mental status was normal with mild appropriate anxiety related to uncertainty of timing of surgery.  She describes a midline neck incision that has well-healed after her surgery      PROCEDURES:   The performance of the flexible laryngoscopy was deferred based on performance of a telehealth visit today given the coronavirus epidemic.  Vocal cord mobility was unable to be visually evaluated.      RESULTS REVIEWED:   I reviewed the referral records from the local otolaryngologist as well as the local endocrinologist including the pathology results, radioactive iodine treatment results, thyroid labs, CT imaging reports    I independently reviewed the CT scan images.  There are postoperative changes from the previous total thyroidectomy.  There is a likely mass in the right thyroid bed  adjacent to the trachea/esophagus.  There are small lymph nodes present in level 3 and 4 in the right neck      IMPRESSION AND PLAN:   Joan Christianson is a 44-year-old woman who has recurrent papillary thyroid carcinoma within her thyroid bed and the right cervical nodes.  The visit today was conducted through telehealth given the coronavirus epidemic.  A total of 35 minutes was spent on the phone with the patient having a discussion about her history as well as the proposed surgery and risks associated with it.  I discussed with her that she would need a right central neck dissection/revision thyroidectomy on the right along with a right neck dissection.    I would anticipate that her incision would require use of her old incision and an extension into the right neck for the lateral neck dissection.  We discussed the risks for the central neck/revision thyroidectomy on the right.  Specifically we spent time discussing the fact that the risks to the recurrent laryngeal nerve are higher with this revision procedure.  We discussed that injury to this can be temporary or permanent.  This would result in potential dysphagia with risk of aspiration.  It could also impact her voice with hoarseness or breathiness.  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  We discussed that if injury whether it is temporary or permanent were to occur to the recurrent laryngeal nerve that there are options for medialization procedures with her laryngology colleagues to help improve her voice but would not improve function.  We also discussed that the left inferior parathyroid gland is at risk.  It sounds like the right superior parathyroid gland was resected with the previous surgery but the left side has both parathyroid glands.  We discussed that only one parathyroid is required.  If the parathyroid was identified but devascularized on the right side we would plan on reimplantation into the neck muscles.     We also reviewed the  neck dissection procedure as well as its risks.  I discussed the risks to nerves and the hypoglossal, spinal accessory, vagus.  I explained to her that I would anticipate she may need some physical therapy for her shoulder following the procedure due to manipulation of the spinal accessory nerve.  We discussed the limitations of shoulder movement could be potentially more problematic for her as she is a teacher and requires ability to lift her arm.  She may very well require early on outpatient physical therapy potentially pending her progress and postoperative course. We discussed the risk of a chyle leak and the need for a no fat diet if this were to occur.     We also reviewed general surgical risks including bleeding, infection, scarring.  She understands that the consent form would indicate a possible blood transfusion although it is often not necessary in these procedures.  We did discuss if there is bleeding after the surgery this can require a return to the operating room.    We would plan a postoperative admission to the hospital with DERRELL drains.  She would be discharged to home with the drains in place and return to clinic for removal when appropriate.    She would need a preop physical performed prior to surgery.    I did spend time discussing with her today on the phone that given the International health crisis caused by the covid-19 virus that we are currently not scheduling non-urgent surgeries.  I acknowledged that this is anxiety provoking for her to not have a specific plan.  I provided reassurance that based on recommendations for the [employer] Endocrine Section that it is appropriate to delay her surgery at this time.  Certainly we are trying to provide the best care possible to all members of the community and we are not ignoring her care but have to prioritize based on multiple considerations.  The majority of patients with well-differentiated grade cancers can have her care  delayed for a period of time without having significant negative impact on her overall prognosis and most thyroid cancer patients will not be harmed by short deferral of their care.  We are ultimately most concerned about her overall wellbeing which includes keeping her safe and minimize her risk of exposure to covid-19.    I reassured her that we will certainly reach out to her we have a plan for moving forward with surgery.  I gave my nurse care coordinator's direct line to the patient so that she has a  to reach if questions or concerns arise.    Thank you very much for the opportunity to participate in the care of your patient.      Vonda River MD, M.D.  Otolaryngology- Head & Neck Surgery      This note was dictated with voice recognition software and then edited. Please excuse any unintentional errors.     This visit was conducted as a telehealth visit in the setting of the coronavirus pandemic limiting direct access to patient care. Verbal consent was obtained from the patient to proceed with the telephone call on 3/25/2020 at 9:20 am for a total of 30 minutes with discussion of history and plan as per the note above.        CC:  Moshe Munguia MD  [facility]  [address]  [place] [address]      Juan David Cruz MD  [facility]  [address] [address]      Declan Duckworth MD  [facility] [address]  [place] [address]

## 2020-03-26 LAB
LAB AP CHARGES (HE HISTORICAL CONVERSION): ABNORMAL
LAB AP INITIAL CYTO EVAL (HE HISTORICAL CONVERSION): ABNORMAL
LAB MED GENERAL PATH INTERP (HE HISTORICAL CONVERSION): ABNORMAL
PATH REPORT.ADDENDUM SPEC: ABNORMAL
PATH REPORT.COMMENTS IMP SPEC: ABNORMAL
PATH REPORT.COMMENTS IMP SPEC: ABNORMAL
PATH REPORT.FINAL DX SPEC: ABNORMAL
PATH REPORT.MICROSCOPIC SPEC OTHER STN: ABNORMAL
PATH REPORT.RELEVANT HX SPEC: ABNORMAL
SPECIMEN DESCRIPTION: ABNORMAL

## 2020-03-26 NOTE — PROGRESS NOTES
Head & Neck Tumor Conference Note        Status: New  Staff: Dr. River    Tumor Site: Right thyroid  Tumor Pathology: Papillary carcinoma  Tumor Stage: T2N1a, now rT1N1b  Tumor Treatment:   -10/2018: total thyroidectomy, central neck dissection with post-op radioiodine    Reason for Review: Review imaging, path, and POC    Brief History: Joan Christianson is a 44-year-old woman who is referred for evaluation of recurrent papillary thyroid carcinoma.  The patient has a history of papillary thyroid cancer that was treated with a total thyroidectomy locally by Dr. Munguia in October 2018.  Pathology from that surgery demonstrated a focal papillary thyroid carcinoma with 2 foci measuring 0.1 cm and 1.3 cm.  The tumor extended to the perithyroidal adipose tissue microscopically and the inked posterior surgical margin.  There was no lymphatic or vascular invasion and the right superior parathyroid was included in the specimen.  There was a level 6 metastatic node measuring 2.4 cm with extranodal extension.  There was no malignancy in the left lobe.  Per review of the operative note the remaining 3 parathyroid glands were preserved.  She had postoperative radioactive iodine for total of 50 mCi performed locally.  Her postoperative thyroid scan was negative for residual disease.  She has been followed by endocrinology with labs including thyroglobulin and thyroglobulin antibodies.  On chest imaging performed in 2019 she was incidentally noted to have possible disease in the thyroid bed.  She has also been noted to have increasing thyroglobulin levels with most recent lab in March 2020 at 11.  She has undergone work-up including a CT neck and a thyroid scan.  She was found to have recurrent disease in the right thyroid bed as well as concerns for metastatic disease in the right neck.  She underwent FNA of the right lateral neck, left lateral neck, right thyroid bed.  The right neck and right thyroid bed were positive for metastatic  "papillary thyroid carcinoma.  She was referred to the [facility] for definitive management.     In discussion with the patient today she says that she is not sure that she notices the mass.  The only symptom she is aware of is she has intermittent tingling of her right arm.  She is eating without difficulty and has no swallowing issues.  She says that currently her voice is normal although she did have issues for several weeks with her voice after the surgery which resolved.  She did have some weight gain of about 10 pounds with adjustment of her thyroid medications but now has had a steady weight.  She does endorse being more physically active to help maintain the weight.  She does have occasional sweating and heat intolerance.    Past medical history: Denies     Past surgical history: Tonsillectomy in childhood, total thyroidectomy in October 2018     Social history: Smoker of 1/2 pack/day for about 20 years, quit in 2018.  She works as a  for undergraduate students.     Family history: Negative for thyroid cancers    Imaging:   OSH CT Neck: There are postoperative changes from the previous total thyroidectomy.  There is a likely mass in the right thyroid bed adjacent to the trachea/esophagus.  There are small lymph nodes present in level 3 and 4 in the right neck    Pathology:   \"FINAL DIAGNOSIS:   CASE FROM [facility], [place], [place] ([id number], OBTAINED 02/21/2020):   A. Right lower neck nodule, ultrasound-guided fine needle aspiration:   - Suspicious for malignancy, papillary carcinoma   - See comment     B. Right lower neck lymph node, ultrasound-guided fine needle aspiration:   - Positive for malignancy, papillary carcinoma     C. Left neck lymph node, ultrasound-guided fine needle aspiration:   - Nondiagnostic - blood only.     COMMENT:   Part A: clusters of crowded follicular cells present with atypical enlarged nuclei. No definitive nuclear inclusions are " "seen. Colloid and hemosiderin-laden macrophages in the background are also noted.   \"    Tumor Board Recommendation:   Discussion: 43yo female with history of T2N1a papillary thyroid carcinoma (s/p total thyroidectomy, central neck dissection and GAMEZ in 2018) with a rT1N1b recurrence in the right thyroid bed.    Review of imaging demonstrates a 1.9x1.5cm mass in the right thyroid bed, multiple concerning lymph nodes in right level 2/3 and 4. She was told by an OSH ENT that she had something up by the mandible, but on imaging there are a couple of normal looking lymph nodes in 1a (not enlarged, shape is normal).    Plan: right central neck dissection/revision thyroidectomy on the right along with a right neck dissection (levels 2-5) once COVID restrictions are lifted.    Leonard Putnam MD PGY-3  Otolaryngology- Head and Neck Surgery    Documentation / Disclaimer Cancer Tumor Board Note  Cancer tumor board recommendations do not override what is determined to be reasonable care and treatment, which is dependent on the circumstances of a patient's case; the patient's medical, social, and personal concerns; and the clinical judgment of the oncologist [physician].    "

## 2020-03-27 ENCOUNTER — TUMOR CONFERENCE (OUTPATIENT)
Dept: ONCOLOGY | Facility: CLINIC | Age: 45
End: 2020-03-27
Payer: COMMERCIAL

## 2020-03-27 NOTE — PROGRESS NOTES
Called and left message for patient to review tumor board discussion and recommendations. Left direct line for patient to return call to discuss.     Rosina Kunz, RN, BSN

## 2020-03-31 NOTE — PROGRESS NOTES
Called patient to review recommendations from tumor board. Patient states that she was able to see [facility] and they have offered her surgery on Monday. Patient was encouraged to call our clinic with any further questions.     Update sent to Dr. River.     Rosina Kunz, RN, BSN

## 2020-04-01 ENCOUNTER — TELEPHONE (OUTPATIENT)
Dept: OTOLARYNGOLOGY | Facility: CLINIC | Age: 45
End: 2020-04-01

## 2020-04-01 NOTE — TELEPHONE ENCOUNTER
----- Message from Lucie Robertson sent at 3/31/2020  3:19 PM CDT -----  Regarding: pa status  I just spoke to patient regarding getting out of network benefits prior auth for surgery and the [facility] facility.    Pt told me that this was no longer needed as she is going to [facility] and they got her in for surgery next week.  I had asked her if she had notified our clinic and she said no she had not as it was just recently confirmed.      Just wanted to inform you all of this.     Lucie Robertson   Prior Authorization Specialist   [email or web address]  Tele: [phone]  Fax: [phone]

## 2020-04-14 ENCOUNTER — COMMUNICATION - HEALTHEAST (OUTPATIENT)
Dept: FAMILY MEDICINE | Facility: CLINIC | Age: 45
End: 2020-04-14

## 2020-04-14 DIAGNOSIS — F41.9 ANXIETY: ICD-10-CM

## 2020-04-14 DIAGNOSIS — Z78.9 ALCOHOL USE: ICD-10-CM

## 2020-08-06 ENCOUNTER — RECORDS - HEALTHEAST (OUTPATIENT)
Dept: ADMINISTRATIVE | Facility: OTHER | Age: 45
End: 2020-08-06

## 2020-12-17 ENCOUNTER — COMMUNICATION - HEALTHEAST (OUTPATIENT)
Dept: FAMILY MEDICINE | Facility: CLINIC | Age: 45
End: 2020-12-17

## 2020-12-17 DIAGNOSIS — Z30.41 ENCOUNTER FOR SURVEILLANCE OF CONTRACEPTIVE PILLS: ICD-10-CM

## 2020-12-21 RX ORDER — NORETHINDRONE ACETATE AND ETHINYL ESTRADIOL 1; 20 MG/1; UG/1
TABLET ORAL
Qty: 28 TABLET | Refills: 0 | Status: SHIPPED | OUTPATIENT
Start: 2020-12-21 | End: 2022-10-21

## 2021-03-09 ENCOUNTER — TRANSFERRED RECORDS (OUTPATIENT)
Dept: HEALTH INFORMATION MANAGEMENT | Facility: CLINIC | Age: 46
End: 2021-03-09

## 2021-03-09 LAB
CHOLESTEROL (EXTERNAL): 204 MG/DL (ref 100–199)
HDLC SERPL-MCNC: 93 MG/DL
HPV ABSTRACT: NORMAL
LDL CHOLESTEROL (EXTERNAL): 98 MG/DL
NON HDL CHOLESTEROL (EXTERNAL): 111 MG/DL
PAP-ABSTRACT: NORMAL
TRIGLYCERIDES (EXTERNAL): 66 MG/DL

## 2021-05-27 NOTE — TELEPHONE ENCOUNTER
Refill Approved    Rx renewed per Medication Renewal Policy. Medication was last renewed on 4/19/18.    Yaneth Nunes, Care Connection Triage/Med Refill 4/9/2019     Requested Prescriptions   Pending Prescriptions Disp Refills     gabapentin (NEURONTIN) 300 MG capsule [Pharmacy Med Name: GABAPENTIN 300 MG CAPSULE] 270 capsule 3     Sig: TAKE 1 CAPSULE (300 MG TOTAL) BY MOUTH 3 (THREE) TIMES A DAY.       Gabapentin/Levetiracetam/Tiagabine Refill Protocol  Passed - 4/8/2019  1:21 AM        Passed - PCP or prescribing provider visit in past 12 months or next 3 months     Last office visit with prescriber/PCP: 9/27/2018 Lan Pearson MD OR same dept: 9/27/2018 Lan Pearson MD OR same specialty: 9/27/2018 Lan Pearson MD  Last physical: 10/22/2018 Last MTM visit: Visit date not found   Next visit within 3 mo: Visit date not found  Next physical within 3 mo: Visit date not found  Prescriber OR PCP: Lan Cervantes MD  Last diagnosis associated with med order: 1. Alcohol use  - gabapentin (NEURONTIN) 300 MG capsule [Pharmacy Med Name: GABAPENTIN 300 MG CAPSULE]; Take 1 capsule (300 mg total) by mouth 3 (three) times a day.  Dispense: 270 capsule; Refill: 3    2. Anxiety  - gabapentin (NEURONTIN) 300 MG capsule [Pharmacy Med Name: GABAPENTIN 300 MG CAPSULE]; Take 1 capsule (300 mg total) by mouth 3 (three) times a day.  Dispense: 270 capsule; Refill: 3    If protocol passes may refill for 12 months if within 3 months of last provider visit (or a total of 15 months).

## 2021-05-30 VITALS — WEIGHT: 142.2 LBS | BODY MASS INDEX: 22.85 KG/M2 | HEIGHT: 66 IN

## 2021-05-30 VITALS — WEIGHT: 143 LBS | BODY MASS INDEX: 24.55 KG/M2

## 2021-05-30 VITALS — BODY MASS INDEX: 24.57 KG/M2 | WEIGHT: 143.13 LBS

## 2021-05-31 VITALS — BODY MASS INDEX: 23.81 KG/M2 | WEIGHT: 147.5 LBS

## 2021-05-31 VITALS — BODY MASS INDEX: 22.47 KG/M2 | WEIGHT: 139.19 LBS

## 2021-05-31 VITALS — BODY MASS INDEX: 23.66 KG/M2 | WEIGHT: 146.56 LBS

## 2021-05-31 VITALS — BODY MASS INDEX: 23.91 KG/M2 | WEIGHT: 148.13 LBS

## 2021-05-31 VITALS — WEIGHT: 148.13 LBS | BODY MASS INDEX: 23.91 KG/M2

## 2021-05-31 VITALS — WEIGHT: 145 LBS | BODY MASS INDEX: 23.4 KG/M2

## 2021-05-31 VITALS — BODY MASS INDEX: 24.14 KG/M2 | WEIGHT: 149.56 LBS

## 2021-05-31 NOTE — TELEPHONE ENCOUNTER
Refill Approved    Rx renewed per Medication Renewal Policy. Medication was last renewed on 11/15/18.    Yaneth Nunes, Care Connection Triage/Med Refill 7/31/2019     Requested Prescriptions   Pending Prescriptions Disp Refills     omeprazole (PRILOSEC) 20 MG capsule [Pharmacy Med Name: OMEPRAZOLE DR 20 MG CAPSULE] 90 capsule 2     Sig: TAKE 1 CAPSULE BY MOUTH EVERY DAY       GI Medications Refill Protocol Passed - 7/31/2019  1:00 AM        Passed - PCP or prescribing provider visit in last 12 or next 3 months.     Last office visit with prescriber/PCP: 9/27/2018 Lan Pearson MD OR same dept: 9/27/2018 Lan Pearson MD OR same specialty: 9/27/2018 Lan Pearson MD  Last physical: 10/22/2018 Last MTM visit: Visit date not found   Next visit within 3 mo: Visit date not found  Next physical within 3 mo: Visit date not found  Prescriber OR PCP: Lan Cervantes MD  Last diagnosis associated with med order: 1. Gastritis  - omeprazole (PRILOSEC) 20 MG capsule [Pharmacy Med Name: OMEPRAZOLE DR 20 MG CAPSULE]; TAKE 1 CAPSULE BY MOUTH EVERY DAY  Dispense: 90 capsule; Refill: 2    If protocol passes may refill for 12 months if within 3 months of last provider visit (or a total of 15 months).

## 2021-06-01 VITALS — WEIGHT: 143.6 LBS | BODY MASS INDEX: 24.65 KG/M2

## 2021-06-01 VITALS — BODY MASS INDEX: 25.95 KG/M2 | WEIGHT: 151.19 LBS

## 2021-06-01 VITALS — WEIGHT: 144.06 LBS | BODY MASS INDEX: 24.73 KG/M2

## 2021-06-01 VITALS — WEIGHT: 147.31 LBS | BODY MASS INDEX: 25.29 KG/M2

## 2021-06-01 VITALS — WEIGHT: 146.7 LBS | BODY MASS INDEX: 25.18 KG/M2

## 2021-06-02 ENCOUNTER — RECORDS - HEALTHEAST (OUTPATIENT)
Dept: ADMINISTRATIVE | Facility: CLINIC | Age: 46
End: 2021-06-02

## 2021-06-02 VITALS — BODY MASS INDEX: 24.74 KG/M2 | WEIGHT: 148.5 LBS | HEIGHT: 65 IN

## 2021-06-02 NOTE — TELEPHONE ENCOUNTER
Refill Request  Did you contact pharmacy: No  Medication name:   Requested Prescriptions     Pending Prescriptions Disp Refills     gabapentin (NEURONTIN) 300 MG capsule 270 capsule 1     Sig: Take 1 capsule (300 mg total) by mouth 3 (three) times a day.     Who prescribed the medication: Lan Pearson MD  Pharmacy Name and Location: [facility] # [id number]  Is patient out of medication: N/A  Patient notified refills processed in 72 hours:  no  Okay to leave a detailed message: no

## 2021-06-02 NOTE — TELEPHONE ENCOUNTER
Refill Approved    Rx renewed per Medication Renewal Policy. Medication was last renewed on 4/9/2019.  Last OV 10/22/2018.    Jody Maya, [facility] Triage/Med Refill 10/18/2019     Requested Prescriptions   Pending Prescriptions Disp Refills     gabapentin (NEURONTIN) 300 MG capsule 270 capsule 1     Sig: Take 1 capsule (300 mg total) by mouth 3 (three) times a day.       Gabapentin/Levetiracetam/Tiagabine Refill Protocol  Passed - 10/18/2019  7:46 AM        Passed - PCP or prescribing provider visit in past 12 months or next 3 months     Last office visit with prescriber/PCP: 9/27/2018 Lan Pearson MD OR same dept: Visit date not found OR same specialty: 9/27/2018 Lan Pearson MD  Last physical: 10/22/2018 Last MTM visit: Visit date not found   Next visit within 3 mo: Visit date not found  Next physical within 3 mo: Visit date not found  Prescriber OR PCP: Lan Cervantes MD  Last diagnosis associated with med order: 1. Alcohol use  - gabapentin (NEURONTIN) 300 MG capsule; Take 1 capsule (300 mg total) by mouth 3 (three) times a day.  Dispense: 270 capsule; Refill: 1    2. Anxiety  - gabapentin (NEURONTIN) 300 MG capsule; Take 1 capsule (300 mg total) by mouth 3 (three) times a day.  Dispense: 270 capsule; Refill: 1    If protocol passes may refill for 12 months if within 3 months of last provider visit (or a total of 15 months).

## 2021-06-02 NOTE — TELEPHONE ENCOUNTER
RN cannot approve Refill Request    RN can NOT refill this medication Protocol failed and NO refill given.       Yaneth Nunes, [facility] Triage/Med Refill 10/25/2019    Requested Prescriptions   Pending Prescriptions Disp Refills     omeprazole (PRILOSEC) 20 MG capsule [Pharmacy Med Name: OMEPRAZOLE DR 20 MG CAPSULE] 90 capsule 3     Sig: TAKE 1 CAPSULE BY MOUTH EVERY DAY       GI Medications Refill Protocol Failed - 10/25/2019  1:54 AM        Failed - PCP or prescribing provider visit in last 12 or next 3 months.     Last office visit with prescriber/PCP: 9/27/2018 Lan Pearson MD OR same dept: Visit date not found OR same specialty: 9/27/2018 Lan Pearson MD  Last physical: 10/22/2018 Last MTM visit: Visit date not found   Next visit within 3 mo: Visit date not found  Next physical within 3 mo: Visit date not found  Prescriber OR PCP: Lan Cervantes MD  Last diagnosis associated with med order: 1. Gastritis  - omeprazole (PRILOSEC) 20 MG capsule [Pharmacy Med Name: OMEPRAZOLE DR 20 MG CAPSULE]; TAKE 1 CAPSULE BY MOUTH EVERY DAY  Dispense: 90 capsule; Refill: 0    If protocol passes may refill for 12 months if within 3 months of last provider visit (or a total of 15 months).

## 2021-06-05 NOTE — TELEPHONE ENCOUNTER
RN cannot approve Refill Request    RN can NOT refill this medication Protocol failed and NO refill given.      Yaneth Nunes, [facility] Triage/Med Refill 1/18/2020    Requested Prescriptions   Pending Prescriptions Disp Refills     JUNEL 1/20, 21, 1-20 mg-mcg per tablet [Pharmacy Med Name: JUNEL 1 MG-20 MCG TABLET] 84 tablet 3     Sig: TAKE 1 TABLET BY MOUTH EVERY DAY       Oral Contraceptives Protocol Failed - 1/17/2020  1:56 AM        Failed - Visit with PCP or prescribing provider visit in last 12 months      Last office visit with prescriber/PCP: 9/27/2018 Lan Pearson MD OR same dept: Visit date not found OR same specialty: 9/27/2018 Lan Pearson MD  Last physical: 10/22/2018 Last MTM visit: Visit date not found   Next visit within 3 mo: Visit date not found  Next physical within 3 mo: Visit date not found  Prescriber OR PCP: Lan Cervantes MD  Last diagnosis associated with med order: There are no diagnoses linked to this encounter.  If protocol passes may refill for 12 months if within 3 months of last provider visit (or a total of 15 months).

## 2021-06-05 NOTE — TELEPHONE ENCOUNTER
RN cannot approve Refill Request    RN can NOT refill this medication Protocol failed and NO refill given.       Yaneth Nunes, [facility] Triage/Med Refill 1/14/2020    Requested Prescriptions   Pending Prescriptions Disp Refills     gabapentin (NEURONTIN) 300 MG capsule [Pharmacy Med Name: GABAPENTIN 300 MG CAPSULE] 270 capsule 0     Sig: TAKE 1 CAPSULE BY MOUTH THREE TIMES A DAY *NEEDS TO BE SEEN FOR FUTURE REFILLS*       Gabapentin/Levetiracetam/Tiagabine Refill Protocol  Failed - 1/11/2020 11:27 AM        Failed - PCP or prescribing provider visit in past 12 months or next 3 months     Last office visit with prescriber/PCP: 9/27/2018 Lan Pearson MD OR same dept: Visit date not found OR same specialty: 9/27/2018 Lan Pearson MD  Last physical: 10/22/2018 Last MTM visit: Visit date not found   Next visit within 3 mo: Visit date not found  Next physical within 3 mo: Visit date not found  Prescriber OR PCP: Lan Cervantes MD  Last diagnosis associated with med order: 1. Alcohol use  - gabapentin (NEURONTIN) 300 MG capsule [Pharmacy Med Name: GABAPENTIN 300 MG CAPSULE]; TAKE 1 CAPSULE BY MOUTH THREE TIMES A DAY *NEEDS TO BE SEEN FOR FUTURE REFILLS*  Dispense: 270 capsule; Refill: 0    2. Anxiety  - gabapentin (NEURONTIN) 300 MG capsule [Pharmacy Med Name: GABAPENTIN 300 MG CAPSULE]; TAKE 1 CAPSULE BY MOUTH THREE TIMES A DAY *NEEDS TO BE SEEN FOR FUTURE REFILLS*  Dispense: 270 capsule; Refill: 0    If protocol passes may refill for 12 months if within 3 months of last provider visit (or a total of 15 months).

## 2021-06-07 NOTE — TELEPHONE ENCOUNTER
1/14/20 refill wanted to have seen prior to refills.  Please advise okay for telephone visit. Onelia Canchola LPN

## 2021-06-07 NOTE — TELEPHONE ENCOUNTER
Reached out to patient and was informed she has a new PCP. Nothing further needed at this time. Chichi Self

## 2021-06-08 NOTE — PROGRESS NOTES
ASSESSMENT/PLAN:  1. Breast pain, left  Related to menstrual cycle.  Patient has had normal mammograms.  She does have family history of breast cancer in her mom.  She was advised to get her screening mammogram.  Stop NSAIDs as this may be upsetting her stomach.  We'll give her tramadol for when necessary  - traMADol (ULTRAM) 50 mg tablet; 1 tablet twice a day as needed  Dispense: 30 tablet; Refill: 0    2. Epigastric pain  Suspect related to NSAID use and that of tobacco.  Stopped NSAID.  Smoking cessation counseled.  I provided her with samples of dexilant.  Await blood and fecal tests  - HM2(CBC w/o Differential)  - Celiac(Gluten)Antibody Panel  - Comprehensive Metabolic Panel  - Lipase  - H. pylori Antigen, Stool  - Thyroid Stimulating Hormone (TSH)    3. Tobacco use  Cessation counseled.  I provided her with samples of Chantix.  - varenicline (CHANTIX CONTINUING MONTH SEBASTIÁN) 1 mg tablet; Take 1 tablet (1 mg total) by mouth 2 (two) times a day. Take with full glass of water.  Dispense: 60 tablet; Refill: 2    4.  Urticaria  Unsure of etiology  Monitor for now    Orders Placed This Encounter   Procedures     H. pylori Antigen, Stool     HM2(CBC w/o Differential)     Celiac(Gluten)Antibody Panel     Comprehensive Metabolic Panel     Lipase     Thyroid Stimulating Hormone (TSH)       CHIEF COMPLAINT:  Chief Complaint   Patient presents with     Abdominal Pain     LUQ after eating not sure  type the food causing it x 9 months off and on     breast pain     left breast, hard  to lift left  arm sometimes       HISTORY OF PRESENT ILLNESS:  Joan is a 41 y.o. female presenting to the clinic today for abdominal pain. The pain has been ongoing since July 2016. At that time, she ate a meal, and an hour later felt sharp pain in her epigastrum. It did not happen for a few weeks, and then it happened again. On subsequent episodes, she had the pain and she felt like her stomach was distended. Around the time of the pain  onset, she had rashes on her body that resembled hives. She thinks that this could be an allergic response. When the pain comes, it stays in the epigastric region, and she feels like she has a lot of gas. The pain last 2-3 hours and goes away on its own. She does not have diarrhea, passing gas, hard stools, heart burn, or excessive burping. She has been taking daily ibuprofen for her breast pain. She does not drink alcohol. Her last episode of pain was about three weeks ago. No new foods, lotions, medications, soaps. She is the only one complaining of stomach pain at home.     Breast Pain: She has had left breast pain for almost 1 year. She describes this as a dull ache, and worsens during her period. About 6 weeks ago, she could not wash her hair because the pain was significant. It was suggested that she cut out caffeine to help with her breast pain. 6 weeks ago, she cut her coffee consumption down to 1 cup per day. She has had a mammogram and biopsies that were normal. She has been buying good supportive bras, but not much has helped. She may think that she has a lot of breast tissue that is causing her pain. She is concerned that her breast pain is in her left breast because it is close to her heart. She can have some left sided chest pain when she takes a deep breath. She does have some stressors with her family and job. Anxiety could be playing a part in these symptoms. She rates the pain at a 2/10 daily. Her right breast is never a problem.    REVIEW OF SYSTEMS:   No fevers or chills. She has had some URI symptoms.   All other systems are negative.    PFSH:  Family history of heart attack in paternal grandfather, aneurysm and blood clots in parents.   She is still smoking, and smoking more than she was before. Interested in Chantix.     TOBACCO USE:  History   Smoking Status     Current Some Day Smoker   Smokeless Tobacco     Never Used       VITALS:  Vitals:    02/08/17 1425   BP: 102/72   Patient Site: Left  Arm   Patient Position: Sitting   Cuff Size: Adult Regular   Pulse: 76   Weight: 143 lb 2 oz (64.9 kg)     Wt Readings from Last 3 Encounters:   02/08/17 143 lb 2 oz (64.9 kg)   03/21/16 146 lb 1.6 oz (66.3 kg)   09/21/15 142 lb 14.4 oz (64.8 kg)       PHYSICAL EXAM:  Constitutional: Patient is oriented to person, place, and time. Patient appears well-developed and well-nourished. No distress.   Abdominal: Soft. Bowel sounds are normal. Patient exhibits no distension and no mass. There is no tenderness. There is no rebound and no guarding.   Neurological: Patient is alert and oriented to person, place, and time.Patient has normal reflexes. No cranial nerve deficit. Coordination normal.   Skin: Skin is warm and dry. No rash noted. Patient is not diaphoretic. No erythema. No pallor.  Breast: Palpable fibrous tissue at the 11 o'clock position 3cm from the nipple of left breast, mild tenderness to touch.         QUALITY MEASURES:  I have counseled the patient for tobacco cessation and the follow up will occur  at the next visit.    ADDITIONAL HISTORY SUMMARIZED (2): None.  DECISION TO OBTAIN EXTRA INFORMATION (1): None.   RADIOLOGY TESTS (1): None.  LABS (1): Ordered labs today.  MEDICINE TESTS (1): None.  INDEPENDENT REVIEW (2 each): None.     The visit lasted a total of 26 minutes face to face with the patient. Over 50% of the time was spent counseling and educating the patient about abdominal pain etiologies.    IRianna, am scribing for and in the presence of, Dr. Campbell.    IDr. Campbell, personally performed the services described in this documentation, as scribed by Rianna Trejo in my presence, and it is both accurate and complete.    MEDICATIONS:  Current Outpatient Prescriptions   Medication Sig Dispense Refill     FEXOFENADINE HCL (ALLEGRA ALLERGY ORAL) Take 1 tablet by mouth daily.       MULTIVITAMIN (MULTIPLE VITAMIN ORAL) Tablet       traMADol (ULTRAM) 50 mg tablet 1 tablet twice a  day as needed 30 tablet 0     varenicline (CHANTIX CONTINUING MONTH SEBASTIÁN) 1 mg tablet Take 1 tablet (1 mg total) by mouth 2 (two) times a day. Take with full glass of water. 60 tablet 2     No current facility-administered medications for this visit.        Total data points: 1

## 2021-06-09 NOTE — PROGRESS NOTES
[facility] GENETIC COUNSELING: CONSULT SUMMARY  Patient: Joan Christianson (1975 / 41 y.o., female) MRN: [id number]   [address] [address]      Date/Location of Visit: 4/3/2017, [facility]  Care Provider: Nasima Madrigal MS, [facility] ([phone], [email or web address])  Reason for Visit: family history of breast cancer  Referring Provider: Lan Cervantes MD  Present: Joan     PRESENTING CONCERN: Joan presents today for evaluation of her family history of breast cancer from a genetic standpoint.  She is seeking to clarify whether she is a candidate for breast MRI surveillance and/or genetic testing based on this history which was first brought up to her by the radiologist after her most recent diagnostic mammogram.  I do see that reported age of onset for Noble's mother has varied somewhat throughout the record.    MEDICAL:  Joan had baseline screening mammogram in February 2012 due to her family history and since then has continued with screening mammograms in addition to diagnostic mammograms and ultrasounds as needed for evaluation of microcalcifications in the left breast.  She had these biopsied in 2012 and 2015 and findings were benign on both occasions.  Joan also has a history of left breast/chest pain which she says fluctuates with her menstrual cycle and this has been noted in her record dating back to 2012.  She has seen cardiology and pulmonology to evaluate this as well and both were reassuring.  Joan most recently had bilateral screening mammogram on 2/24/2017 and was again noted to have increasing microcalcifications on the left; the right breast was stable and benign; both breasts are noted to be heterogeneously dense.  She had diagnostic left mammogram on 3/3/2017 and the calcifications were not significantly changed from prior imaging and felt to be consistent with benign etiology.  At age 41 Joan has not yet required  colonoscopy.  Her uterus and ovaries remain in place.  She reports first menstrual period at age 12 and she is currently premenopausal although notes some occasional hot flashes the last 2 years.  Joan uses oral contraceptives from age [age] with the exception of pregnancy.  She was 30 at first childbirth.  No hormone replacement.  Joan does have a history of smoking but has been trying to quit.  No prior transplant or recent transfusion.    FAMILY: Three-generation pedigree was taken to assess cancer risk using information provided by Joan and is scanned into her chart.  The importance of accurate and verified history was emphasized.  Joan reports that her mother was diagnosed with DCIS (apparently ER and HER2 positive) and she clarifies that her mother was age 50 at diagnosis.  Joan's mother was subsequently diagnosed with what she describes as a new primary lung cancer at age 55 and she ultimately  at age 57.  Joan's mother did have a history of smoking from ages 15-50.  Joan reports melanoma in a maternal aunt and her maternal grandfather.  Joan's maternal grandfather had a sister and a brother (great-aunt and great-uncle to Joan) who had cancer but the type is not known.  Similarly, one of Joan's paternal aunts was diagnosed with cancer later in life but the type is not known.  There is a paternal first-cousin who was diagnosed with breast cancer around age 39 and is now 46.  Joan is of predominantly [place] descent with no Religious ancestry and no consanguinity.    In evaluating the family history we discussed differences between sporadic, environmental, and genetic contributions to cancer risk including relevant genetic principals and inheritance patterns.  I emphasized that the factors underlying cancer diagnosis are often multiple and complex.    GENETIC TESTING: The history as provided today is not sufficient to recommend genetic testing for hereditary cancer including  BRCA1/2 for Joan according to guidelines from the National Comprehensive Cancer Network (NCCN).  Specifically, Joan's mother was diagnosed with breast cancer at age 50 (early-onset would be 45 and younger), her cancer was not triple-negative, and the remainder of her family is negative for any strongly related cancers, Adventist ancestry, or limited structure.  On the other hand, Joan's paternal first-cousin was diagnosed at a notably young age (39) and is a candidate for genetic testing herself, but this is not easily extended to Joan since she is a third-degree relative and related to this cousin through her paternal aunt who is without cancer in her 70s-80s.      Besides BRCA1/2 there are several other genes known to be linked to rare hereditary breast cancer predisposition syndromes but suspicion again remains low for mutations in TP53, PTEN, STK11, or CDH1 in the absence of other presenting history. There are also newer genes related to more moderate breast cancer risk (JAY, CHEK2, etc) and for which there are no specific testing criteria at this time.  I emphasized that recommendations may evolve over time with advances in knowledge.  At some point genetic testing may be utilized more broadly to evaluate breast cancer risk in the general population or in those with more moderate family history.  In the meantime genetic testing is still available to Joan but would be expected to involve out-of-pocket cost generally ranging from $260-$475 in this scenario.  I made note that this would be with low likelihood of a positive result for Joan and understanding that a negative result would be uninformative (i.e., would not eliminate cancer risk).    I did encourage Joan to follow-up with me if she obtains any new or additional information about her family including clarification as to the primary site of cancers in her great-aunt, great-uncle, and paternal aunt.  She is also recommended to clarify if  her cousin has had genetic testing at this point.  Joan thinks maybe her mother had BRCA1/2 testing and I provided a [facility] release form that she can complete as personal representative and we can see if there are any such records with them.  [facility] would have been the only [place] laboratory performing BRCA1/2 testing outside of research at that time.  If any family members have had a positive (abnormal) genetic test then this could certainly change recommendations for Joan.    In the meantime Joan's care should be guided by the history itself, as outlined below.     RISK ASSESSMENT:  Using family history driven models, the following estimates of breast cancer risk apply to Joan:    Lifetime risk to age 85 = about 27% (IBIS8), compared to risk for the average 40 y.o.-year-old of 13%  Risk over the next 10 years = 3-4% (IBIS8), compared to risk for the average 40 y.o.-year-old of 1-2%    This did not include all potential risk factors including quantification of breast density.  I also noted that other models including BRCAPRO would tend to suggest a smaller elevation in risk compared to IBIS8.  Overall, the IBIS8 assessment is sufficient to recommend heightened breast surveillance for Joan according to current NCCN guidelines as follows:       Annual mammograms and annual breast MRIs.  Joan should also talk with her doctors about the type of mammogram available including the option of 3-dimensional digital mammograms (tomosynthesis).    The NCCN also recommends clinical visits with a healthcare provider that include breast exam at least every 6-12 months.    Joan should be familiar with her breasts and promptly report changes to a healthcare provider.     Joan should of course also continue with any follow-up recommended based on prior imaging/microcalcifications and/or breast pain as determined by her providers.    Risk-reducing anti-estrogen medications may be considered to help lower the  chance of breast cancer; however, benefits will not outweigh potential side effects for all and this should be evaluated under the guidance of a personal physician.  Current 5-year risk based on the [employer] model is just over 3% compared to average risk of less than 1% but is likely influenced by prior benign biopsies for Joan.    Continuation of general population screening for other cancers per ACS/NCCN guidelines is also appropriate at a minimum.  Joan may consider full-body skin exams based on her family history of melanoma.  Important lifestyle considerations include regular exercise, maintaining a healthy diet and weight, limiting alcohol consumption, avoidance/cessation of smoking, use of safety precautions in the sun, and ensuring adequate vitamin D.    PLAN: Breast screening should be coordinated through Joan's primary care physician or a breast center and she was going to follow-up with Dr. Campbell to get set up for breast MRI.  She just had mammogram in February so ideally the MRI would happen in the Fall.  We discussed contacting her insurance to clarify coverage and address any needed authorization or appeal.  Joan was comfortable proceeding without any genetic testing today.  She will complete the release form and provide personal representative documentation so we can request any records of her mother's from [employer].  Joan was also encouraged to follow-up with her first-cousin to determine whether or not she has had any genetic testing and the results.    Joan should contact our clinic at least annually for updates or with changes to personal/family history as the clinic does not routinely re-contact individual patients with an increase or change in knowledge.  It was a pleasure to meet Joan today and to participate in her care.  I am of course happy to address any follow-up questions/concerns should these arise for Joan or her providers.          APRIL LIRA, MS, [facility]  Certified  Genetic Counselor  [facility]    Handouts or Enclosures:  contact information    Face-to-face time: 40 minutes    cc:   Joan Cervantes MD

## 2021-06-09 NOTE — PROGRESS NOTES
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  For the last 12-16 months she has suffered from a cyclical chest pain over her left upper chest/breast that she feels gets more painful during her menstruation.  The pain becomes deep at time and then makes her feel short of breath.  She has been worried this means there is something wrong with her heart or her lungs and would like reassurance that neither of these are at risk.  The dyspnea can resolve quickly if she can manage the pain.  She has no cough, or fever, or right sided chest pains.  She is not chronically short of breath, and she believes that some of this is likely anxiety over the pain.  Her mother had breast cancer, and she has had two biopsies of suspicious lesions, one resulted in a hematoma.  She has calcified breasts and this has made it hard to screen her.  She admits this has caused her a lot of stress.    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  She has two children.  She admits to a fair bit of stress and agrees she does not take much time for herself.  She is trying to quit smoking, and has quit many times in the past.  She has , at most smoked 1 ppd but this was never sustained.    PMH:  No past medical history on file.  Fibrous breast tissue    PSH:  Past Surgical History:   Procedure Laterality Date     BREAST BIOPSY Left 03/2012     ME REMOVAL OF TONSILS,<14 Y/O      Description: Tonsillectomy;  Recorded: 07/29/2009;       SH:  Social History     Social History     Marital status:      Spouse name: N/A     Number of children: N/A     Years of education: N/A     Occupational History     Not on file.     Social History Main Topics     Smoking status: Former Smoker     Quit date: 02/2017     Smokeless tobacco: Never Used     Alcohol use Not on file     Drug use: Not on file     Sexual activity: Not on file     Other Topics Concern     Not on file      Social History Narrative       Family history:  Family History   Problem Relation Age of Onset     Breast cancer Mother 43     DCIS     Breast cancer Cousin      Anuerysm Father 60     Heart attack Paternal Grandfather 70         ROS:  Review of Systems - History obtained from the patient  General ROS: negative  Psychological ROS: negative  ENT ROS: negative  Allergy and Immunology ROS: negative  Endocrine ROS: negative  Respiratory ROS: positive for - pleuritic pain and shortness of breath  negative for - cough, hemoptysis, orthopnea, tachypnea or wheezing  Cardiovascular ROS: no chest pain or palpitations  Gastrointestinal ROS: no abdominal pain, change in bowel habits, or black or bloody stools  Genito-Urinary ROS: no dysuria, trouble voiding, or hematuria  Musculoskeletal ROS: negative  Neurological ROS: no TIA or stroke symptoms  Dermatological ROS: negative      Current Meds:  Current Outpatient Prescriptions   Medication Sig     FEXOFENADINE HCL (ALLEGRA ALLERGY ORAL) Take 1 tablet by mouth daily.     MULTIVITAMIN (MULTIPLE VITAMIN ORAL) Tablet     varenicline (CHANTIX CONTINUING MONTH SEBASTIÁN) 1 mg tablet Take 1 tablet (1 mg total) by mouth 2 (two) times a day. Take with full glass of water.     traMADol (ULTRAM) 50 mg tablet 1 tablet twice a day as needed       Labs:  Recent Results (from the past 72 hour(s))   Echo Stress Exercise   Result Value Ref Range    Pharmacologic Protocol  Stress Echo     Test Type Treadmill     Baseline HR 66     Baseline /64     Last Stress      Last Stress /62     Target      PERCENT HR 85%     Exercise duration (min) 10     Exercise duration (sec) 0     Estimated workload 11.7     ST Deviation Elevation aVL 0.9mm     Deviation Time III -1.8mm     ST Elevation Amount II 1.0mm     ST Deviation Amount he III -1.0mm     Final Resting BP 98/68     Final Resting HR  82     Max Treadmill Speed 4.2     Max Treadmill Grade 16.0     Peak Systolic /62      Peak Diastolic /74     Max      Stress Phase Resting     Stress Resting Pt Position STANDING     Current HR 66     Current /64     Stress Phase Resting     Stress Resting Pt Position MANUAL EVENT     Current HR 79     Current /58     Stress Phase Stress     Stage Minute EXE 00:00     Exercise Stage STAGE 1     Current HR 79     Current /60     Stress Phase Stress     Stage Minute EXE 01:00     Exercise Stage STAGE 1     Current HR 94     Current /60     Stress Phase Stress     Stage Minute EXE 02:00     Exercise Stage STAGE 1     Current HR 96     Current /60     Stress Phase Stress     Stage Minute EXE 02:57     Exercise Stage STAGE 1     Current HR 91     Current /74     Stress Phase Stress     Stage Minute EXE 03:00     Exercise Stage STAGE 2     Current HR 97     Current /74     Stress Phase Stress     Stage Minute EXE 04:00     Exercise Stage STAGE 2     Current      Current /74     Stress Phase Stress     Stage Minute EXE 04:40     Exercise Stage STAGE 2     Current      Current /74     Stress Phase Stress     Stage Minute EXE 05:00     Exercise Stage STAGE 2     Current      Current /74     Stress Phase Stress     Stage Minute EXE 05:29     Exercise Stage STAGE 2     Current      Current /68     Stress Phase Stress     Stage Minute EXE 06:00     Exercise Stage STAGE 3     Current      Current /68     Stress Phase Stress     Stage Minute EXE 07:00     Exercise Stage STAGE 3     Current      Current /68     Stress Phase Stress     Stage Minute EXE 08:00     Exercise Stage STAGE 3     Current      Current /68     Stress Phase Stress     Stage Minute EXE 08:42     Exercise Stage STAGE 3     Current      Current /62     Stress Phase Stress     Stage Minute EXE 09:00     Exercise Stage STAGE 4     Current      Current /62     Stress Phase Stress     Stage  Minute EXE 09:31     Exercise Stage STAGE 4     Current      Current /62     Stress Phase Stress     Stage Minute EXE 09:55     Exercise Stage STAGE 4     Current      Current /62     Stress Phase Stress     Stage Minute EXE 10:00     Exercise Stage STAGE 4     Current      Current /62     Stress Phase Stress     Stage Minute EXE 10:00     Exercise Stage STAGE 4     Current      Current /62     Stress Phase Recovery     Stage Minute REC 00:53     Exercise Stage Recovery     Current      Current /38     Stress Phase Recovery     Stage Minute REC 00:59     Exercise Stage Recovery     Current      Current /38     Stress Phase Recovery     Stage Minute REC 01:20     Exercise Stage Recovery     Current      Current /58     Stress Phase Recovery     Stage Minute REC 01:59     Exercise Stage Recovery     Current      Current /58     Stress Phase Recovery     Stage Minute REC 02:59     Exercise Stage Recovery     Current HR 98     Current /58     Stress Phase Recovery     Stage Minute REC 03:11     Exercise Stage Recovery     Current HR 79     Current /58     Stress Phase Recovery     Stage Minute REC 03:51     Exercise Stage Recovery     Current      Current /66     Stress Phase Recovery     Stage Minute REC 03:59     Exercise Stage Recovery     Current HR 87     Current /66     Stress Phase Recovery     Stage Minute REC 04:59     Exercise Stage Recovery     Current HR 89     Current /66     Stress Phase Recovery     Stage Minute REC 05:17     Exercise Stage Recovery     Current HR 88     Current BP 98/68     Stress Phase Recovery     Stage Minute REC 05:59     Exercise Stage Recovery     Current HR 86     Current BP 98/68     Stress Phase Recovery     Stage Minute REC 06:12     Exercise Stage Recovery     Current HR 82     Current BP 98/68     Calculated Percent HR 91 %       I have  "personally reviewed all imaging and PFT data available pertinent to this visit.    Imaging studies:  No results found.  Reviewed a CXR from 2015 - normal      PFTs:  Not done    Physical Exam:  Visit Vitals     /60     Pulse 67     Resp 16     Ht 5' 6\" (1.676 m)     Wt 142 lb 3.2 oz (64.5 kg)     LMP 02/03/2017     SpO2 99%  Comment: RA     BMI 22.95 kg/m2     General - Well nourished  Ears/Mouth - TMs clear bilaterally,  OP pink moist, no thrush  Neck - no cervical lymphadenopathy  Lungs - Clear to ausculation bilaterally  CVS - regular rhythm with no murmurs, rubs or gallups  Abdomen - soft, NT, ND, NABS  Ext - no cyanosis, clubbing or edema  Skin - no rash  Psychology - alert and oriented, answers appropriate      Assessment and Plan:Joan Christianson is a 41 y.o. with a past medical history significant for fibrous breast tissue who presents to clinic today for chest pains with shortness of breath.  She would like reassurance this is not her lung, and I am very confident it is not in her lung.  I suspect she is having referred pain from the swollen breast tissue she has, and this likely radiates through her chest wall.  This can create a shortness of breath if she will not take a full breath given the pain.  I also agree with the patient that anxiety will then play a part, and could amplify the sensation of dyspnea and pain.  I recommend she work to better treat this pain, and also find ways to reduce stress in her life.  She is susceptible to physical manifestations of stress (prior panic attacks, GI ulcer - could be related to NSAID use too).    1. Dyspnea - combination of pleuritic pain with a component of panic attacks.  Recommend mindfullness when the spells come on and use of NSAIDS (with an acid blocker) or low dose tramadol as needed.  Also recommend finding a form of stress reduction coupled with exercise to help control her stress which will make her more prone to panic attacks.  2. Chest pains - " given the cyclical nature, and her age, the chances of this being a pulmonary process like a pleural based mass are exceedingly low.  If her symptoms progress over time, we could consider further imaging, but I do not think this is warranted now.  3. Tobacco abuse - counseled her on permanent smoking cessation, and the importance of keeping this as a high priority    Electronically signed by:    Neeraj Larson MD PhD  [facility]

## 2021-06-09 NOTE — PROGRESS NOTES
ASSESSMENT/PLAN:  1. Chest pain  Nonexertional, located left breast, worse during menstrual cycle  Less likely that this is cardiac.  Will do stress echo to confirm  - Echo Stress Exercise; Future    2. Nicotine dependence  Doing well on chantix.  2 wks of being tobacco free  Patient requested referral to pulmonology for reassurance.  May need PFT  - Ambulatory referral to Pulmonology    3. Family history of breast cancer  Personal h/o calcifications on left breast.  Mom with DCIS in her early 40s.  May need breast MRI depending on risk  - Ambulatory referral to Genetics      Orders Placed This Encounter   Procedures     Ambulatory referral to Genetics     Referral Priority:   Routine     Referral Type:   Consultation     Referral Reason:   Evaluation and Treatment     Requested Specialty:   Genetics     Number of Visits Requested:   1     Ambulatory referral to Pulmonology     Referral Priority:   Routine     Referral Type:   Consultation     Referral Reason:   Evaluation and Treatment     Number of Visits Requested:   1           CHIEF COMPLAINT:  Chief Complaint   Patient presents with     Follow-up     discuss mammogran, not sure if the pain is coming from left breast, chest or lung, still hurting.       HISTORY OF PRESENT ILLNESS:  Joan is a 41 y.o. female presenting to the clinic today for a follow up for breast/chest pain. She did have her mammogram, and the radiologist suggested that she see a genetic counselor and have a MRI. New calcifications were found on her most recent mammogram in her upper left breast. She was still continuing with her chest/breast after she was cleared with her mammogram. She then felt anxious and started to have a panic attacks about her pain. She feels like she would like to rule out any cardiac or lung problems with this pain. She did have some chest tightness and difficulties breathing recently, but she attributes this to some anxiety. She has not taken ibuprofen in 3  weeks, and feels she has a heightened awareness of her pains.     Smoking Cessation: She has not smoked in 1.5 weeks. She has been taking Chantix, and is on day 7 of the maintenance dose. She does have some more vivid dreams on the medication, but has not felt any depression or aggression.     REVIEW OF SYSTEMS:   Her abdominal pain is improving, and she has only had one occurrence.  All other systems are negative.    PFSH:  No new history.     TOBACCO USE:  History   Smoking Status     Current Some Day Smoker   Smokeless Tobacco     Never Used       VITALS:  Vitals:    03/06/17 1327   BP: 102/60   Patient Site: Left Arm   Patient Position: Sitting   Cuff Size: Adult Regular   Pulse: (!) 56   Weight: 143 lb (64.9 kg)     Wt Readings from Last 3 Encounters:   03/06/17 143 lb (64.9 kg)   02/08/17 143 lb 2 oz (64.9 kg)   03/21/16 146 lb 1.6 oz (66.3 kg)       PHYSICAL EXAM:  Constitutional: Patient is oriented to person, place, and time. Patient appears well-developed and well-nourished. No distress.   Cardiovascular: Normal rate.  Pulmonary/Chest: Effort normal. No respiratory distress.   Neurological: Patient is alert and oriented to person, place, and time.      Results for orders placed or performed in visit on 02/08/17   H. pylori Antigen, Stool   Result Value Ref Range    Helicobacter Pylori Antigen Negative Negative   HM2(CBC w/o Differential)   Result Value Ref Range    WBC 4.7 4.0 - 11.0 thou/uL    RBC 4.15 3.80 - 5.40 mill/uL    Hemoglobin 13.3 12.0 - 16.0 g/dL    Hematocrit 38.6 35.0 - 47.0 %    MCV 93 80 - 100 fL    MCH 32.0 27.0 - 34.0 pg    MCHC 34.5 32.0 - 36.0 g/dL    RDW 10.8 (L) 11.0 - 14.5 %    Platelets 228 140 - 440 thou/uL    MPV 7.2 7.0 - 10.0 fL   Celiac(Gluten)Antibody Panel   Result Value Ref Range    Gliadin IgA 0.7 0.0-<7.0 U/mL    Gliadin IgG <0.4 0.0-<7.0 U/mL    Tissue Transglutaminase IgG AB <0.6 0.0-<7.0 U/mL    Tissue Transglutaminase IgA AB 0.5 0.0-<7.0 U/mL    Immunoglobulin A 145 63  - 400 mg/dL   Comprehensive Metabolic Panel   Result Value Ref Range    Sodium 140 136 - 145 mmol/L    Potassium 4.0 3.5 - 5.0 mmol/L    Chloride 109 (H) 98 - 107 mmol/L    CO2 23 22 - 31 mmol/L    Anion Gap, Calculation 8 5 - 18 mmol/L    Glucose 84 70 - 125 mg/dL    BUN 11 8 - 22 mg/dL    Creatinine 0.72 0.60 - 1.10 mg/dL    GFR MDRD Af Amer >60 >60 mL/min/1.73m2    GFR MDRD Non Af Amer >60 >60 mL/min/1.73m2    Bilirubin, Total 0.2 0.0 - 1.0 mg/dL    Calcium 8.9 8.5 - 10.5 mg/dL    Protein, Total 6.6 6.0 - 8.0 g/dL    Albumin 3.9 3.5 - 5.0 g/dL    Alkaline Phosphatase 54 45 - 120 U/L    AST 14 0 - 40 U/L    ALT 10 0 - 45 U/L   Lipase   Result Value Ref Range    Lipase 37 0 - 52 U/L   Thyroid Stimulating Hormone (TSH)   Result Value Ref Range    TSH 1.15 0.30 - 5.00 uIU/mL       QUALITY MEASURES:  I have counseled the patient for tobacco cessation and prescribed the patient a tobacco cessation medication and the follow up will occur  at the next visit.    ADDITIONAL HISTORY SUMMARIZED (2): None.  DECISION TO OBTAIN EXTRA INFORMATION (1): None.   RADIOLOGY TESTS (1): Reviewed mammograms from 2/24/2017 and 3/3/2017.  LABS (1): None.  MEDICINE TESTS (1): Ordered stress test.  INDEPENDENT REVIEW (2 each): None.     The visit lasted a total of 9 minutes face to face with the patient. Over 50% of the time was spent counseling and educating the patient about pain etiologies.    IRianna, am scribing for and in the presence of, Dr. Campbell.    IDr. Campbell, personally performed the services described in this documentation, as scribed by Rainna Trejo in my presence, and it is both accurate and complete.    MEDICATIONS:  Current Outpatient Prescriptions   Medication Sig Dispense Refill     FEXOFENADINE HCL (ALLEGRA ALLERGY ORAL) Take 1 tablet by mouth daily.       MULTIVITAMIN (MULTIPLE VITAMIN ORAL) Tablet       traMADol (ULTRAM) 50 mg tablet 1 tablet twice a day as needed 30 tablet 0      varenicline (CHANTIX CONTINUING MONTH SEBASTIÁN) 1 mg tablet Take 1 tablet (1 mg total) by mouth 2 (two) times a day. Take with full glass of water. 60 tablet 2     No current facility-administered medications for this visit.        Total data points: 2

## 2021-06-11 NOTE — PROGRESS NOTES
41-year-old female with headaches, mixed type migraine predominant, presents for worsening headaches.  She has wanted to episode every month with each episode lasting for days.  I will give her Imitrex.  She may take Tylenol in addition.  Okay to do caffeine in addition to this.  Review medication side effects.  Reviewed warning symptoms.  Follow-up if not better.  She verbalized understanding and agreed with the plan    ASSESSMENT/PLAN:  1. Migraine  - SUMAtriptan (IMITREX) 50 MG tablet; Take 0.5 tablets (25 mg total) by mouth every 2 (two) hours as needed for migraine.  Dispense: 10 tablet; Refill: 3    CHIEF COMPLAINT:  Chief Complaint   Patient presents with     Migraine     would like meds to help        HISTORY OF PRESENT ILLNESS:  Joan is a 41 y.o. female presenting to the clinic today for evaluation of migraines. She started to have them when she was 15 years old. When she had her child at age 26, the migraines stopped. But, between the ages of [age], she had them frequently, and used Imitrex to help. She was taking ibuprofen daily for joint aches, but stopped in March due to stomach upset and ulcer. She started to have headaches again in April 2017. Since then, she has had 2 major migraine episodes that lasted days. Resting in a dark, quiet place with ice on her head did help her. She tried taking tramadol without relief of the headaches. She also has tried Tylenol without relief. She does have nausea from the headaches. She feels it mostly behind the eyes, more so on the right side. She describes the headache as a stabbing, piercing pain. She cannot pinpoint her headache triggers, but feels like it is a combination of outdoor allergies and hormones. When she has a headache, she rates the pain at a 7-8/10.     REVIEW OF SYSTEMS:   Denies change in diet, history of bleeding, vomiting. All other systems are negative.    PFSH:  She is going on vacation for one month soon.     TOBACCO USE:  History   Smoking  Status     Former Smoker     Quit date: 02/2017   Smokeless Tobacco     Never Used       VITALS:  Vitals:    07/05/17 0913   BP: 118/78   Patient Site: Left Arm   Patient Position: Sitting   Cuff Size: Infant   Pulse: 66   Weight: 145 lb (65.8 kg)     Wt Readings from Last 3 Encounters:   07/05/17 145 lb (65.8 kg)   03/15/17 142 lb 3.2 oz (64.5 kg)   03/06/17 143 lb (64.9 kg)       PHYSICAL EXAM:  Constitutional: Patient is oriented to person, place, and time. Patient appears well-developed and well-nourished. No distress.   Cardiovascular: Normal rate.  Pulmonary/Chest: Effort normal. No respiratory distress.   Neurological: Patient is alert and oriented to person, place, and time.  No facial asymmetry.  No neurological deficits.  Coordination is intact.      ADDITIONAL HISTORY SUMMARIZED (2): None.  DECISION TO OBTAIN EXTRA INFORMATION (1): None.   RADIOLOGY TESTS (1): None.  LABS (1): None.  MEDICINE TESTS (1): None.  INDEPENDENT REVIEW (2 each): None.     IRianna, am scribing for and in the presence of, Dr. Campbell.    Lan GANDHI MD , personally performed the services described in this documentation, as scribed by Rianna Trejo in my presence, and it is both accurate and complete.    MEDICATIONS:  Current Outpatient Prescriptions   Medication Sig Dispense Refill     FEXOFENADINE HCL (ALLEGRA ALLERGY ORAL) Take 1 tablet by mouth daily.       MULTIVITAMIN (MULTIPLE VITAMIN ORAL) Tablet       SUMAtriptan (IMITREX) 50 MG tablet Take 0.5 tablets (25 mg total) by mouth every 2 (two) hours as needed for migraine. 10 tablet 3     traMADol (ULTRAM) 50 mg tablet 1 tablet twice a day as needed 30 tablet 0     No current facility-administered medications for this visit.        Total data points: 0

## 2021-06-12 NOTE — PROGRESS NOTES
ASSESSMENT/PLAN:  41 y.o. Female with h/o anxiety and mood d/o in remission presents with recurrence of symptoms.  She has been under significant psychosocial stressors.  Of note, she completed one month of chantix a few months ago.  I will resume fluoxetine at 10mg but she may increase to 20mg in one week at her discretion.  I will give her trazodone for sleep.  She will call in 2 wks with an update and f/u in the clinic in one month.  Motivational interviewing utilized.  We contracted for safety.  Spoke about internal locus of control in finding ways to cope with her stressors.  She verbalized understanding and agreed with the plan    Anxiety  -     FLUoxetine (PROZAC) 10 MG capsule; Take 1 capsule (10 mg total) by mouth daily.  Dispense: 30 capsule; Refill: 0    Need for immunization against influenza  -     Influenza, Seasonal,Quad Inj, 36+ MOS    Mood disorder  -     FLUoxetine (PROZAC) 10 MG capsule; Take 1 capsule (10 mg total) by mouth daily.  Dispense: 30 capsule; Refill: 0    Insomnia  -     traZODone (DESYREL) 50 MG tablet; Take 1 tablet (50 mg total) by mouth at bedtime.  Dispense: 30 tablet; Refill: 0      Orders Placed This Encounter   Procedures     Influenza, Seasonal,Quad Inj, 36+ MOS       CHIEF COMPLAINT:  Chief Complaint   Patient presents with     Anxiety     has taking med for it years ago, not taking med right now, but pass 6 months has been hard.      Depression       HISTORY OF PRESENT ILLNESS:  Joan is a 41 y.o. female presenting to the clinic today for anxiety/depression. She does have a history of anxiety and depression. She was taking a medication for it in the past. Her anxiety has been getting worse in the past 6 months. She does have a lot going on in life, and a lot of extra stressors. She did take Chantix for smoking cessation, but did not refill this because of how she was feeling. She has felt some increased shortness of breath, sweating, muscle tenseness and shakiness. She  did take fluoxetine for a period of time, and took Seroquel for sleep. In the past, she did not have panic attacks like she is now. Her sleep has been disrupted because of the anxious feelings. It has been hard for her to focus on things. She has been more easily irritable. She also notes that she has had loss of words or will not say the correct word in the moment. She did quit smoking, but finds herself smoking. However, she does not like this behavior, and gets upset with herself. She has been going to yoga, exercising and cutting down on caffeine to stay calm. She has been trying to get dinner with non family friends once per week for some therapeutic time.     Little interest or pleasure in doing things: More than half the days  Feeling down, depressed, or hopeless: Several days  Trouble falling or staying asleep, or sleeping too much: Nearly every day  Feeling tired or having little energy: Nearly every day  Poor appetite or overeating: Nearly every day  Feeling bad about yourself - or that you are a failure or have let yourself or your family down: Several days  Trouble concentrating on things, such as reading the newspaper or watching television: Nearly every day  Moving or speaking so slowly that other people could have noticed. Or the opposite - being so fidgety or restless that you have been moving around a lot more than usual: Several days  Thoughts that you would be better off dead, or of hurting yourself in some way: Not at all  PHQ-9 Total Score: 17  If you checked off any problems, how difficult have these problems made it for you to do your work, take care of things at home, or get along with other people?: Very difficult    Feeling nervous, anxious or on edge: 3  Not being able to stop or control worry: 3  Worrying too much about different things: 2  Trouble relaxing: 3  Being so restless that is is hard to sit still: 3  Becoming easily annnoyed or irritable: 2  Feeling afraid as if something awful  might happen: 1  SADI-7 Total: 17  How difficult did these problems make it for you to do your work, take care of things at home or get along with other people? : Very difficult    REVIEW OF SYSTEMS:   Constitutional: Negative.   HENT: Negative.   Eyes: Negative.   Respiratory: Negative.   Cardiovascular: Negative.   Gastrointestinal: Negative.   Endocrine: Negative.   Genitourinary: Negative.   Musculoskeletal: Negative.   Skin: Negative.   Allergic/Immunologic: Negative.   Neurological: Negative.   Hematological: Negative.   All other systems are negative.    PFSH:  No alcohol use.     TOBACCO USE:  History   Smoking Status     Former Smoker     Quit date: 02/2017   Smokeless Tobacco     Never Used       VITALS:  Vitals:    09/07/17 1520   BP: 114/72   Patient Site: Left Arm   Patient Position: Sitting   Cuff Size: Adult Regular   Pulse: 72   Weight: 139 lb 3 oz (63.1 kg)     Wt Readings from Last 3 Encounters:   09/07/17 139 lb 3 oz (63.1 kg)   07/05/17 145 lb (65.8 kg)   03/15/17 142 lb 3.2 oz (64.5 kg)       PHYSICAL EXAM:  Constitutional: Patient is oriented to person, place, and time. Patient appears well-developed and well-nourished. No distress.   Cardiovascular: Normal rate.  Pulmonary/Chest: Effort normal. No respiratory distress.   Neurological: Patient is alert and oriented to person, place, and time.  MENTAL STATUS EXAM  BEHAVIOR/APPEARANCE    Inappropriate sexual behavior: Absent    General appearance: Awake, alert, appropriately groomed, in no acute distress.    Gait: no imbalance   Social Skills: appropriate    Eye Contact:  Good    Motor Activity: no psychomotor retardation, restrained and sterotypic   SPEECH: Normal, rate, rhythm, and volume, adequate responses   ORIENTATION: Patient is oriented x3   MOOD & AFFECT    Mood: depressed   Affect: tense, frustrated  THOUGHT PROCESS    Thought Process: adequate rate    Language: English   Fund of Knowledge: average    Thought Content:  circumstantial   Association: adequate    Judgement: adequate    Insight: adequate   COGNITIVE EXAM    Cognitive Function: Oriented to person, place, time, and situation.    Recent/Remote Memory: not assessed    Intelligence: not assessed    Attention Span: not  assessed    Concentration: not assessed    Computation: not assessed       Results for orders placed or performed during the hospital encounter of 03/13/17   Echo Stress Exercise   Result Value Ref Range    Pharmacologic Protocol  Stress Echo     Test Type Treadmill     Baseline HR 66     Baseline /64     Last Stress      Last Stress /62     Target      PERCENT HR 85%     Exercise duration (min) 10     Exercise duration (sec) 0     Estimated workload 11.7     ST Deviation Elevation aVL 0.9mm     Deviation Time III -1.8mm     ST Elevation Amount II 1.0mm     ST Deviation Amount he III -1.0mm     Final Resting BP 98/68     Final Resting HR 82     Max Treadmill Speed 4.2     Max Treadmill Grade 16.0     Peak Systolic /62     Peak Diastolic /74     Max      Stress Phase Resting     Stress Resting Pt Position STANDING     Current HR 66     Current /64     Stress Phase Resting     Stress Resting Pt Position MANUAL EVENT     Current HR 79     Current /58     Stress Phase Stress     Stage Minute EXE 00:00     Exercise Stage STAGE 1     Current HR 79     Current /60     Stress Phase Stress     Stage Minute EXE 01:00     Exercise Stage STAGE 1     Current HR 94     Current /60     Stress Phase Stress     Stage Minute EXE 02:00     Exercise Stage STAGE 1     Current HR 96     Current /60     Stress Phase Stress     Stage Minute EXE 02:57     Exercise Stage STAGE 1     Current HR 91     Current /74     Stress Phase Stress     Stage Minute EXE 03:00     Exercise Stage STAGE 2     Current HR 97     Current /74     Stress Phase Stress     Stage Minute EXE 04:00     Exercise Stage STAGE 2      Current      Current /74     Stress Phase Stress     Stage Minute EXE 04:40     Exercise Stage STAGE 2     Current      Current /74     Stress Phase Stress     Stage Minute EXE 05:00     Exercise Stage STAGE 2     Current      Current /74     Stress Phase Stress     Stage Minute EXE 05:29     Exercise Stage STAGE 2     Current      Current /68     Stress Phase Stress     Stage Minute EXE 06:00     Exercise Stage STAGE 3     Current      Current /68     Stress Phase Stress     Stage Minute EXE 07:00     Exercise Stage STAGE 3     Current      Current /68     Stress Phase Stress     Stage Minute EXE 08:00     Exercise Stage STAGE 3     Current      Current /68     Stress Phase Stress     Stage Minute EXE 08:42     Exercise Stage STAGE 3     Current      Current /62     Stress Phase Stress     Stage Minute EXE 09:00     Exercise Stage STAGE 4     Current      Current /62     Stress Phase Stress     Stage Minute EXE 09:31     Exercise Stage STAGE 4     Current      Current /62     Stress Phase Stress     Stage Minute EXE 09:55     Exercise Stage STAGE 4     Current      Current /62     Stress Phase Stress     Stage Minute EXE 10:00     Exercise Stage STAGE 4     Current      Current /62     Stress Phase Stress     Stage Minute EXE 10:00     Exercise Stage STAGE 4     Current      Current /62     Stress Phase Recovery     Stage Minute REC 00:53     Exercise Stage Recovery     Current      Current /38     Stress Phase Recovery     Stage Minute REC 00:59     Exercise Stage Recovery     Current      Current /38     Stress Phase Recovery     Stage Minute REC 01:20     Exercise Stage Recovery     Current      Current /58     Stress Phase Recovery     Stage Minute REC 01:59     Exercise Stage Recovery     Current      Current BP  116/58     Stress Phase Recovery     Stage Minute REC 02:59     Exercise Stage Recovery     Current HR 98     Current /58     Stress Phase Recovery     Stage Minute REC 03:11     Exercise Stage Recovery     Current HR 79     Current /58     Stress Phase Recovery     Stage Minute REC 03:51     Exercise Stage Recovery     Current      Current /66     Stress Phase Recovery     Stage Minute REC 03:59     Exercise Stage Recovery     Current HR 87     Current /66     Stress Phase Recovery     Stage Minute REC 04:59     Exercise Stage Recovery     Current HR 89     Current /66     Stress Phase Recovery     Stage Minute REC 05:17     Exercise Stage Recovery     Current HR 88     Current BP 98/68     Stress Phase Recovery     Stage Minute REC 05:59     Exercise Stage Recovery     Current HR 86     Current BP 98/68     Stress Phase Recovery     Stage Minute REC 06:12     Exercise Stage Recovery     Current HR 82     Current BP 98/68     Calculated Percent HR 91 %           ADDITIONAL HISTORY SUMMARIZED (2): None.  DECISION TO OBTAIN EXTRA INFORMATION (1): None.   RADIOLOGY TESTS (1): None.  LABS (1): Reviewed TSH from 2/8/2017.  MEDICINE TESTS (1): None.  INDEPENDENT REVIEW (2 each): None.       I, Rianna Trejo, am scribing for and in the presence of, Dr. Campbell.    ILan MD , personally performed the services described in this documentation, as scribed by Rianna Trejo in my presence, and it is both accurate and complete.    MEDICATIONS:  Current Outpatient Prescriptions   Medication Sig Dispense Refill     FEXOFENADINE HCL (ALLEGRA ALLERGY ORAL) Take 1 tablet by mouth daily.       MULTIVITAMIN (MULTIPLE VITAMIN ORAL) Tablet       SUMAtriptan (IMITREX) 50 MG tablet Take 0.5 tablets (25 mg total) by mouth every 2 (two) hours as needed for migraine. 10 tablet 3     traMADol (ULTRAM) 50 mg tablet 1 tablet twice a day as needed 30 tablet 0      FLUoxetine (PROZAC) 10 MG capsule Take 1 capsule (10 mg total) by mouth daily. 30 capsule 0     traZODone (DESYREL) 50 MG tablet Take 1 tablet (50 mg total) by mouth at bedtime. 30 tablet 0     No current facility-administered medications for this visit.        Total data points: 1

## 2021-06-13 NOTE — TELEPHONE ENCOUNTER
RN cannot approve Refill Request    RN can NOT refill this medication PCP messaged that patient is overdue for Office Visit. Last office visit: 9/27/2018 Lan Pearson MD Last Physical: 10/22/2018 Last MTM visit: Visit date not found Last visit same specialty: 9/27/2018 Lan Pearson MD.  Next visit within 3 mo: Visit date not found  Next physical within 3 mo: Visit date not found      Karen Bellamy, [facility] Triage/Med Refill 12/19/2020    Requested Prescriptions   Pending Prescriptions Disp Refills     JUNEL 1/20, 21, 1-20 mg-mcg per tablet [Pharmacy Med Name: JUNEL 1 MG-20 MCG TABLET] 84 tablet 3     Sig: TAKE 1 TABLET BY MOUTH EVERY DAY       Oral Contraceptives Protocol Failed - 12/17/2020  8:22 AM        Failed - Visit with PCP or prescribing provider visit in last 12 months      Last office visit with prescriber/PCP: 9/27/2018 Lan Pearson MD OR same dept: Visit date not found OR same specialty: 9/27/2018 Lan Pearson MD  Last physical: 10/22/2018 Last MTM visit: Visit date not found   Next visit within 3 mo: Visit date not found  Next physical within 3 mo: Visit date not found  Prescriber OR PCP: Lan Cervantes MD  Last diagnosis associated with med order: 1. Encounter for surveillance of contraceptive pills  - JUNEL 1/20, 21, 1-20 mg-mcg per tablet [Pharmacy Med Name: JUNEL 1 MG-20 MCG TABLET]; TAKE 1 TABLET BY MOUTH EVERY DAY  Dispense: 84 tablet; Refill: 3    If protocol passes may refill for 12 months if within 3 months of last provider visit (or a total of 15 months).

## 2021-06-13 NOTE — PROGRESS NOTES
ASSESSMENT/PLAN:  Irregular menses  41-year-old female presents with irregular menstrual cycles whereby she has had 3 menstrual bleeding over the last 6 weeks.  Further management will depend on her lab and ultrasound results.  She has no concerns regarding sexually transmitted infection hence this was not done.  She verbalized understanding and agreed with the plan.    -     Thyroid Cascade  -     HM2(CBC w/o Differential)  -     Comprehensive Metabolic Panel  -     US Pelvis With Transvaginal Non OB; Future; Expected date: 11/6/17    Mood, anxiety  She is aware that stress may induce irregular vaginal bleeding and she is working on this.  She is taking fluoxetine 10 mg and will decrease trazodone to 25 mg.      Alcohol abuse  she has alcohol abuse and is taking disulfiram which is helping except giving her headaches      Orders Placed This Encounter   Procedures     US Pelvis With Transvaginal Non OB     Standing Status:   Future     Standing Expiration Date:   11/6/2018     Order Specific Question:   Is the patient pregnant?     Answer:   No     Order Specific Question:   Can the procedure be changed per Radiologist protocol?     Answer:   Yes     Thyroid Cascade     HM2(CBC w/o Differential)     Comprehensive Metabolic Panel           CHIEF COMPLAINT:  Chief Complaint   Patient presents with     cycle issue     had 3 cycles in the past 6 weeks       HISTORY OF PRESENT ILLNESS:  Joan is a 41 y.o. female presenting to the clinic today for irregular periods. It has been 6 years since she went off birth control. Her periods were normal until 6 weeks ago. In the past 6 weeks, she has had 3 periods. She has been tracking her periods, and they are averaging about 17 days apart. Some of her periods are lasting longer than normal or shorter than normal. No concerns for STDs. She was having some extra stressors and increasing in anxiety lately. She has not history of irregular periods. She is due for a PAP smear,  last PAP smear was normal.     Anxiety/Depression: She is taking fluoxetine 10mg, and trazodone for sleep. This was working for her, and she did feel better, but she forgot to take this for a period of time. She does think that the trazodone makes her groggy in the day time. She has been taking the disulfiram. She feels like she has had a mild headache for the past 3 weeks in the back of the eyes. This is working for her, but she has found some side effects when she is around every day products that contain alcohol.     REVIEW OF SYSTEMS:   Constitutional: Negative.   HENT: Negative.   Eyes: Negative.   Respiratory: Negative.   Cardiovascular: Negative.   Gastrointestinal: Negative.   Endocrine: Negative.   Genitourinary: Negative.   Musculoskeletal: Negative.   Skin: Negative.   Allergic/Immunologic: Negative.   Neurological: Negative.   Hematological: Negative.   Psychiatric/Behavioral: Negative.   All other systems are negative.    PFSH:  No new history.     TOBACCO USE:  History   Smoking Status     Former Smoker     Quit date: 02/2017   Smokeless Tobacco     Never Used       VITALS:  Vitals:    11/06/17 1517   BP: 102/64   Pulse: 72   Weight: 146 lb 9 oz (66.5 kg)     Wt Readings from Last 3 Encounters:   11/06/17 146 lb 9 oz (66.5 kg)   09/07/17 139 lb 3 oz (63.1 kg)   07/05/17 145 lb (65.8 kg)       PHYSICAL EXAM:  Constitutional: Patient is oriented to person, place, and time. Patient appears well-developed and well-nourished. No distress.   Head: Normocephalic and atraumatic.   Right Ear: External ear normal.   Left Ear: External ear normal.   Nose: Nose normal.   Eyes: Conjunctivae and EOM are normal. Right eye exhibits no discharge. Left eye exhibits no discharge. No scleral icterus.   Skin: Skin is warm and dry. No rash noted. Patient is not diaphoretic. No erythema. No pallor.  The patient has good eye contact.  No psychomotor retardation or stereotypical behaviors.  Speech is regular rate, regular  rhythm, adequate responses.  Mood is stable and affect is congruent mood.  No suicidal or homicidal intent.  No hallucination.    Results for orders placed or performed during the hospital encounter of 03/13/17   Echo Stress Exercise   Result Value Ref Range    Pharmacologic Protocol  Stress Echo     Test Type Treadmill     Baseline HR 66     Baseline /64     Last Stress      Last Stress /62     Target      PERCENT HR 85%     Exercise duration (min) 10     Exercise duration (sec) 0     Estimated workload 11.7     ST Deviation Elevation aVL 0.9mm     Deviation Time III -1.8mm     ST Elevation Amount II 1.0mm     ST Deviation Amount he III -1.0mm     Final Resting BP 98/68     Final Resting HR 82     Max Treadmill Speed 4.2     Max Treadmill Grade 16.0     Peak Systolic /62     Peak Diastolic /74     Max      Stress Phase Resting     Stress Resting Pt Position STANDING     Current HR 66     Current /64     Stress Phase Resting     Stress Resting Pt Position MANUAL EVENT     Current HR 79     Current /58     Stress Phase Stress     Stage Minute EXE 00:00     Exercise Stage STAGE 1     Current HR 79     Current /60     Stress Phase Stress     Stage Minute EXE 01:00     Exercise Stage STAGE 1     Current HR 94     Current /60     Stress Phase Stress     Stage Minute EXE 02:00     Exercise Stage STAGE 1     Current HR 96     Current /60     Stress Phase Stress     Stage Minute EXE 02:57     Exercise Stage STAGE 1     Current HR 91     Current /74     Stress Phase Stress     Stage Minute EXE 03:00     Exercise Stage STAGE 2     Current HR 97     Current /74     Stress Phase Stress     Stage Minute EXE 04:00     Exercise Stage STAGE 2     Current      Current /74     Stress Phase Stress     Stage Minute EXE 04:40     Exercise Stage STAGE 2     Current      Current /74     Stress Phase Stress     Stage Minute EXE 05:00      Exercise Stage STAGE 2     Current      Current /74     Stress Phase Stress     Stage Minute EXE 05:29     Exercise Stage STAGE 2     Current      Current /68     Stress Phase Stress     Stage Minute EXE 06:00     Exercise Stage STAGE 3     Current      Current /68     Stress Phase Stress     Stage Minute EXE 07:00     Exercise Stage STAGE 3     Current      Current /68     Stress Phase Stress     Stage Minute EXE 08:00     Exercise Stage STAGE 3     Current      Current /68     Stress Phase Stress     Stage Minute EXE 08:42     Exercise Stage STAGE 3     Current      Current /62     Stress Phase Stress     Stage Minute EXE 09:00     Exercise Stage STAGE 4     Current      Current /62     Stress Phase Stress     Stage Minute EXE 09:31     Exercise Stage STAGE 4     Current      Current /62     Stress Phase Stress     Stage Minute EXE 09:55     Exercise Stage STAGE 4     Current      Current /62     Stress Phase Stress     Stage Minute EXE 10:00     Exercise Stage STAGE 4     Current      Current /62     Stress Phase Stress     Stage Minute EXE 10:00     Exercise Stage STAGE 4     Current      Current /62     Stress Phase Recovery     Stage Minute REC 00:53     Exercise Stage Recovery     Current      Current /38     Stress Phase Recovery     Stage Minute REC 00:59     Exercise Stage Recovery     Current      Current /38     Stress Phase Recovery     Stage Minute REC 01:20     Exercise Stage Recovery     Current      Current /58     Stress Phase Recovery     Stage Minute REC 01:59     Exercise Stage Recovery     Current      Current /58     Stress Phase Recovery     Stage Minute REC 02:59     Exercise Stage Recovery     Current HR 98     Current /58     Stress Phase Recovery     Stage Minute REC 03:11     Exercise Stage Recovery      Current HR 79     Current /58     Stress Phase Recovery     Stage Minute REC 03:51     Exercise Stage Recovery     Current      Current /66     Stress Phase Recovery     Stage Minute REC 03:59     Exercise Stage Recovery     Current HR 87     Current /66     Stress Phase Recovery     Stage Minute REC 04:59     Exercise Stage Recovery     Current HR 89     Current /66     Stress Phase Recovery     Stage Minute REC 05:17     Exercise Stage Recovery     Current HR 88     Current BP 98/68     Stress Phase Recovery     Stage Minute REC 05:59     Exercise Stage Recovery     Current HR 86     Current BP 98/68     Stress Phase Recovery     Stage Minute REC 06:12     Exercise Stage Recovery     Current HR 82     Current BP 98/68     Calculated Percent HR 91 %           ADDITIONAL HISTORY SUMMARIZED (2): None.  DECISION TO OBTAIN EXTRA INFORMATION (1): None.   RADIOLOGY TESTS (1): Ordered pelvic US.  LABS (1): Reviewed and ordered labs today.  MEDICINE TESTS (1): None.  INDEPENDENT REVIEW (2 each): None.     IRianna, am scribing for and in the presence of, Dr. Campbell.    ILan MD , personally performed the services described in this documentation, as scribed by Rianna Trejo in my presence, and it is both accurate and complete.    MEDICATIONS:  Current Outpatient Prescriptions   Medication Sig Dispense Refill     disulfiram (ANTABUSE) 500 mg Tab Take 500 mg by mouth daily. Must be abstinent from alcohol for more than 12 hours 30 each 0     FEXOFENADINE HCL (ALLEGRA ALLERGY ORAL) Take 1 tablet by mouth daily.       FLUoxetine (PROZAC) 10 MG capsule Take 1 capsule (10 mg total) by mouth daily. 30 capsule 5     MULTIVITAMIN (MULTIPLE VITAMIN ORAL) Tablet       SUMAtriptan (IMITREX) 50 MG tablet Take 0.5 tablets (25 mg total) by mouth every 2 (two) hours as needed for migraine. 10 tablet 3     traMADol (ULTRAM) 50 mg tablet 1 tablet twice a day  as needed 30 tablet 0     traZODone (DESYREL) 50 MG tablet Take 1 tablet (50 mg total) by mouth at bedtime. 30 tablet 5     No current facility-administered medications for this visit.        Total data points: 2

## 2021-06-15 NOTE — PROGRESS NOTES
ASSESSMENT/PLAN:  Delirium  I suspected the combination of disulfiram, trazodone, tramadol, and benadryl is what contributed to the delirious episode.  Stop tramadol and take NSAIDs prn in place.  Stop benadryl.  Reduce disulfiram dose to 250mg; do not drink alcohol.  Call in 2 wks with update.  With excessive thirst, will check CMP   -     Comprehensive Metabolic Panel  -     [facility]    Alcohol use disorder  -     disulfiram (ANTABUSE) 250 mg tablet; Take 1 tablet (250 mg total) by mouth daily.  Dispense: 30 tablet; Refill: 0        Orders Placed This Encounter   Procedures     Comprehensive Metabolic Panel     [facility]           CHIEF COMPLAINT:  Chief Complaint   Patient presents with     med check     discuss medications, needs a better balance on her meds       HISTORY OF PRESENT ILLNESS:  Joan is a 42 y.o. female presenting to the clinic today for a follow up for alcohol abuse. She is taking the disulfiram 500mg daily. She feels like she is not at a good balance with her medications. She has had some headaches, and possible allergic reaction. She tried taking Benadryl for the itching, but feels this made her too foggy and she does not remember her evening. She was awake, and interacting with people, but does not recall the events of the evening. She has also been taking daily Allegra. She has not had any new soap, detergent or chemical exposures to cause the itchiness. She has not felt herself, and has felt some general body aches, and an increased in thirst. She has not had any urinary frequency or increased output. She does continue her nightly trazodone 50mg and fluoxetine 10mg. She has been taking tramadol and Imitrex as needed for her headaches. She feels like she could stop the tramadol, and go back to taking ibuprofen as needed for her headaches. Denies any alcohol use.     REVIEW OF SYSTEMS:   HENT: Negative.   Eyes: Negative.   Respiratory: Negative.   Cardiovascular: Negative.    Gastrointestinal: Negative.   Endocrine: Negative.   Genitourinary: Negative.   Musculoskeletal: Negative.   Allergic/Immunologic: Negative.   Neurological: Negative.   Hematological: Negative.   All other systems are negative.    PFSH:  Family history of diabetes in father.     TOBACCO USE:  History   Smoking Status     Former Smoker     Quit date: 02/2017   Smokeless Tobacco     Never Used       VITALS:  Vitals:    01/03/18 1150   BP: 122/84   Pulse: 92   Weight: 149 lb 9 oz (67.8 kg)     Wt Readings from Last 3 Encounters:   01/03/18 149 lb 9 oz (67.8 kg)   11/06/17 146 lb 9 oz (66.5 kg)   09/07/17 139 lb 3 oz (63.1 kg)       PHYSICAL EXAM:  Constitutional: Patient is oriented to person, place, and time. Patient appears well-developed and well-nourished. No distress.   Cardiovascular: Normal rate.  Pulmonary/Chest: Effort normal. No respiratory distress.   Neurological: Patient is alert and oriented to person, place, and time.  The patient has good eye contact.  No psychomotor retardation or stereotypical behaviors.  Speech is regular rate, regular rhythm, adequate responses.  Mood is stable and affect is congruent mood.  No suicidal or homicidal intent.  No hallucination.      Results for orders placed or performed in visit on 11/06/17   Thyroid Cascade   Result Value Ref Range    TSH 1.39 0.30 - 5.00 uIU/mL   HM2(CBC w/o Differential)   Result Value Ref Range    WBC 5.9 4.0 - 11.0 thou/uL    RBC 4.08 3.80 - 5.40 mill/uL    Hemoglobin 13.2 12.0 - 16.0 g/dL    Hematocrit 39.4 35.0 - 47.0 %    MCV 97 80 - 100 fL    MCH 32.3 27.0 - 34.0 pg    MCHC 33.5 32.0 - 36.0 g/dL    RDW 10.8 (L) 11.0 - 14.5 %    Platelets 259 140 - 440 thou/uL    MPV 6.9 (L) 7.0 - 10.0 fL   Comprehensive Metabolic Panel   Result Value Ref Range    Sodium 138 136 - 145 mmol/L    Potassium 3.9 3.5 - 5.0 mmol/L    Chloride 104 98 - 107 mmol/L    CO2 27 22 - 31 mmol/L    Anion Gap, Calculation 7 5 - 18 mmol/L    Glucose 64 (L) 70 - 125 mg/dL     BUN 11 8 - 22 mg/dL    Creatinine 0.79 0.60 - 1.10 mg/dL    GFR MDRD Af Amer >60 >60 mL/min/1.73m2    GFR MDRD Non Af Amer >60 >60 mL/min/1.73m2    Bilirubin, Total 0.2 0.0 - 1.0 mg/dL    Calcium 9.5 8.5 - 10.5 mg/dL    Protein, Total 6.7 6.0 - 8.0 g/dL    Albumin 3.8 3.5 - 5.0 g/dL    Alkaline Phosphatase 56 45 - 120 U/L    AST 17 0 - 40 U/L    ALT 13 0 - 45 U/L           ADDITIONAL HISTORY SUMMARIZED (2): None.  DECISION TO OBTAIN EXTRA INFORMATION (1): None.   RADIOLOGY TESTS (1): None.  LABS (1): Reviewed and ordered labs today.  MEDICINE TESTS (1): None.  INDEPENDENT REVIEW (2 each): None.         IRianna, am scribing for and in the presence of, Dr. Campbell.    ILan MD , personally performed the services described in this documentation, as scribed by Rianna Trejo in my presence, and it is both accurate and complete.    MEDICATIONS:  Current Outpatient Prescriptions   Medication Sig Dispense Refill     disulfiram 500 mg Tab TAKE 1 TABLET BY MOUTH DAILY. MUST BE ABSTINENT FROM ALCOHOL FOR MORE THAN 12 HOURS 30 each 0     FEXOFENADINE HCL (ALLEGRA ALLERGY ORAL) Take 1 tablet by mouth daily.       FLUoxetine (PROZAC) 10 MG capsule Take 1 capsule (10 mg total) by mouth daily. 90 capsule 0     MULTIVITAMIN (MULTIPLE VITAMIN ORAL) Tablet       SUMAtriptan (IMITREX) 50 MG tablet TAKE 1/2 TABLET (25 MG TOTAL) BY MOUTH EVERY 2 HOURS AS NEEDED FOR MIGRAINE. 10 tablet 2     traZODone (DESYREL) 50 MG tablet Take 1 tablet (50 mg total) by mouth at bedtime. 90 tablet 0     disulfiram (ANTABUSE) 250 mg tablet Take 1 tablet (250 mg total) by mouth daily. 30 tablet 0     No current facility-administered medications for this visit.        Total data points: 1

## 2021-06-15 NOTE — PROGRESS NOTES
ASSESSMENT/PLAN:  Alcohol use disorder  Alcohol cessation counseled.  Patient will reach out to her sponsor.  Patient is awaiting follow-up call from her psychotherapist.  Motivational interviewing was utilized today.  Patient will restart Antabuse.  We spoke about outpatient treatment for chemical dependency but she does not think that this will be helpful.  Call later this week with an update  -     disulfiram (ANTABUSE) 250 mg tablet; TAKE 1 TABLET (250 MG TOTAL) BY MOUTH DAILY.    Anxiety, mood d/o  She abruptly discontinued fluoxetine about a month ago.  PHQ9=26, GAD7=21  She will restart fluoxetine 10 mg.  Go ahead and schedule an appointment with her psychotherapist.  Motivational interviewing was utilized today.  We discussed safety.  We discussed self-care.  She will call later this week with an update  -     FLUoxetine (PROZAC) 10 MG capsule; Take 1 capsule (10 mg total) by mouth daily.    Migraine  -     SUMAtriptan (IMITREX) 50 MG tablet; TAKE 1- 1/2 TABLET BY MOUTH EVERY 2 HOURS AS NEEDED FOR MIGRAINE. Max 200mg/24 hr  -     ketorolac injection 30 mg (TORADOL); Inject 1 mL (30 mg total) into the shoulder, thigh, or buttocks once.  -     ketorolac injection 30 mg (TORADOL); Inject 1 mL (30 mg total) into the shoulder, thigh, or buttocks once.    Insomnia  Restart trazodone at 25 mg.  Call with an update later this week  -     traZODone (DESYREL) 50 MG tablet; Take 1 tablet (50 mg total) by mouth at bedtime.    SUBJECTIVE:    Joan Christianson is a 42 y.o. female who comes in today with her sister for concerns regarding depression and anxiety.  She was seen about a month ago and I reduce her Antabuse dose from 500-250 mg.  Instead of doing this she decided to discontinue all of her medication abruptly.  These medications include fluoxetine, disulfiram, Imitrex, and trazodone.  As a result, she has had significant symptoms related to depression and anxiety.  The symptoms have been extremely difficult for her  to function.  Nearly every day she feels lack of interest and pleasure in doing things, feeling down depressed and hopeless.  Notably every day she has trouble with sleep, energy, poor appetite, feels bad about herself, poor concentration.  She has had thoughts of not wanting to be around but has no intent or plan.  Similarly, every day she feels nervous, anxious, on edge.  Every day she feels like she can control her morning or that she is going to much about different things.  Every day she has trouble with relaxing and feeling restless.  Every day she feels easily annoyed and irritable.  Every day she feels afraid that something awful might happen to her.    A week ago she had fever, cough, myalgia.  She is not sure if she had a flu or if the symptoms were related to abruptly stopping all her medication.  She is a little better now.  She is feeling safe and is with her sister.  She has started drinking again with her last use was last night.  She has been in outpatient treatment previously.  She currently has a sponsor.  She has reach out to her psychotherapist and is awaiting a response back from them.  She is hoping to schedule an appointment this week.    Review of Systems (except those mentioned above)  Constitutional: Negative.   HENT: Negative.   Eyes: Negative.   Respiratory: Negative.   Cardiovascular: Negative.   Gastrointestinal: Negative.   Endocrine: Negative.   Genitourinary: Negative.   Musculoskeletal: Negative.   Skin: Negative.   Allergic/Immunologic: Negative.   Neurological: Negative.   Hematological: Negative.   Psychiatric/Behavioral: Negative.     There are no active problems to display for this patient.    Allergies   Allergen Reactions     Hydrocodone-Acetaminophen Swelling     Current Outpatient Prescriptions   Medication Sig Dispense Refill     disulfiram (ANTABUSE) 250 mg tablet TAKE 1 TABLET (250 MG TOTAL) BY MOUTH DAILY. 30 tablet 0     disulfiram 500 mg Tab TAKE 1 TABLET BY MOUTH  DAILY. MUST BE ABSTINENT FROM ALCOHOL FOR MORE THAN 12 HOURS 30 each 0     FEXOFENADINE HCL (ALLEGRA ALLERGY ORAL) Take 1 tablet by mouth daily.       FLUoxetine (PROZAC) 10 MG capsule Take 1 capsule (10 mg total) by mouth daily. 30 capsule 0     MULTIVITAMIN (MULTIPLE VITAMIN ORAL) Tablet       SUMAtriptan (IMITREX) 50 MG tablet TAKE 1- 1/2 TABLET BY MOUTH EVERY 2 HOURS AS NEEDED FOR MIGRAINE. Max 200mg/24 hr 10 tablet 2     traZODone (DESYREL) 50 MG tablet Take 1 tablet (50 mg total) by mouth at bedtime. 90 tablet 0     Current Facility-Administered Medications   Medication Dose Route Frequency Provider Last Rate Last Dose     ketorolac injection 30 mg (TORADOL)  30 mg Intramuscular Once Lan Cervantes MD         ketorolac injection 30 mg (TORADOL)  30 mg Intramuscular Once Lan Cervantes MD         No past medical history on file.  Past Surgical History:   Procedure Laterality Date     BREAST BIOPSY Left 03/2012     SC REMOVAL OF TONSILS,<12 Y/O      Description: Tonsillectomy;  Recorded: 07/29/2009;     Social History     Social History     Marital status:      Spouse name: N/A     Number of children: N/A     Years of education: N/A     Social History Main Topics     Smoking status: Former Smoker     Quit date: 02/2017     Smokeless tobacco: Never Used     Alcohol use None     Drug use: None     Sexual activity: Not Asked     Other Topics Concern     None     Social History Narrative     Family History   Problem Relation Age of Onset     Breast cancer Mother 50     DCIS     Lung cancer Mother 55     Breast cancer Cousin 39     paternal first-cousin     Pulmonary embolism Father 60     Heart attack Paternal Grandfather 70     Melanoma Maternal Aunt      Cancer Paternal Aunt      late-onset, type unknown     Melanoma Maternal Grandfather      Cancer Other      great-uncle (maternal grandfather's brother), type unknown     Cancer Other      great-aunt (maternal grandfather's  sister), type unknown         OBJECTIVE:    Vitals:    02/05/18 1326   BP: 110/72   Pulse: (!) 112   Weight: 147 lb 8 oz (66.9 kg)     Body mass index is 23.81 kg/(m^2).    Physical Exam:  Constitutional: Patient is oriented to person, place, and time.  She has sunglasses on.  Patient appears well-developed and well-nourished. No distress.   Head: Normocephalic and atraumatic.   Right Ear: External ear normal. Normal TM  Left Ear: External ear normal. Normal TM  Nose: Nose normal.   Mouth/Throat: Oropharynx is clear and moist. No oropharyngeal exudate.   Eyes: Conjunctivae and EOM are normal. Pupils are equal, round, and reactive to light. Right eye exhibits no discharge. Left eye exhibits no discharge. No scleral icterus.   Neck: Neck supple. No JVD present. No tracheal deviation present. No thyromegaly present.   Lymphadenopathy:  Patient has no cervical adenopathy.   Cardiovascular: Normal rate, regular rhythm, normal heart sounds and intact distal pulses. No murmur heard.   Pulmonary/Chest: Effort normal and breath sounds normal. No stridor. No respiratory distress. Patient has no wheezes, no rales, exhibits no tenderness.   Skin: Skin is warm and dry. No rash noted. Patient is not diaphoretic. No erythema. No pallor.   The patient has good eye contact.  No psychomotor retardation or stereotypical behaviors.  Speech is regular rate, regular rhythm, adequate responses.  Mood is down and affect is congruent mood.  No suicidal or homicidal intent.  No hallucination.      Results for orders placed or performed in visit on 01/03/18   Comprehensive Metabolic Panel   Result Value Ref Range    Sodium 135 (L) 136 - 145 mmol/L    Potassium 4.6 3.5 - 5.0 mmol/L    Chloride 102 98 - 107 mmol/L    CO2 26 22 - 31 mmol/L    Anion Gap, Calculation 7 5 - 18 mmol/L    Glucose 77 70 - 125 mg/dL    BUN 10 8 - 22 mg/dL    Creatinine 0.70 0.60 - 1.10 mg/dL    GFR MDRD Af Amer >60 >60 mL/min/1.73m2    GFR MDRD Non Af Amer >60 >60  mL/min/1.73m2    Bilirubin, Total 0.4 0.0 - 1.0 mg/dL    Calcium 9.9 8.5 - 10.5 mg/dL    Protein, Total 7.3 6.0 - 8.0 g/dL    Albumin 3.9 3.5 - 5.0 g/dL    Alkaline Phosphatase 55 45 - 120 U/L    AST 36 0 - 40 U/L    ALT 35 0 - 45 U/L

## 2021-06-16 PROBLEM — F41.9 ANXIETY: Status: ACTIVE | Noted: 2018-04-19

## 2021-06-16 PROBLEM — C73 THYROID CANCER (H): Status: ACTIVE | Noted: 2018-10-23

## 2021-06-16 PROBLEM — F17.210 SMOKES CIGARETTES: Status: ACTIVE | Noted: 2018-10-23

## 2021-06-16 PROBLEM — F32.A DEPRESSION: Status: ACTIVE | Noted: 2018-04-19

## 2021-06-16 PROBLEM — F10.10 ETOH ABUSE: Status: ACTIVE | Noted: 2018-04-19

## 2021-06-16 NOTE — PROGRESS NOTES
ASSESSMENT/PLAN:  Myalgia  42-year-old female with alcohol dependence, anxiety, mood comes in today for follow-up.  Last alcohol use was about a week and half ago.  Anxiety and mood symptoms are improving.  She has mild dry mouth, mild residual dizziness and mild residual headache that are better and slowly improving.  Restless leg and leg twitching at night in addition to leg stiffness and myalgia during the day are new symptoms.  I suspect the symptoms may resolve with time.  For now, will check labs.  Continue with gabapentin 300 mg at night.  Continue with fluoxetine 10 mg.  And continue with Antabuse 250 mg with the eventual goal of discontinuation.  Patient will follow up via my chart in 2 weeks and in the clinic in 1 month.  Motivational interviewing was utilized today.  Continue with alcohol cessation.  She verbalized understanding and agreed with the plan  -     HM2(CBC w/o Differential)  -     Comprehensive Metabolic Panel  -     Thyroid Cascade  -     Vitamin B12  -     Folate, Serum  -     Thiamin (Vitamin B1), Whole Blood  -     Magnesium    Orders Placed This Encounter   Procedures     HM2(CBC w/o Differential)     Comprehensive Metabolic Panel     Thyroid Cascade     Vitamin B12     Folate, Serum     Thiamin (Vitamin B1), Whole Blood     Magnesium           CHIEF COMPLAINT:  Chief Complaint   Patient presents with     med check     Gabapentin thinks it helping       HISTORY OF PRESENT ILLNESS:  Joan is a 42 y.o. female presenting to the clinic today for a follow up for anxiety and insomnia. She started taking the gabapentin about one week ago. She is taking gabapentin 300mg nightly. She did have some dizziness after starting the medication, but this is better. Her headaches have improved as well; she has not need sumatriptan in the past week. When she takes the gabapentin at night, she has noticed some restless in her legs. She is able to fall back asleep, but her legs feel achy during the day.  She has had some night sweats associated with taking the gabapentin. She did stop the trazodone. She is back to taking the fluoxetine 10mg. She is still taking the disulfiram, and last alcohol exposure was 1.5 weeks ago. She is having more loose stool than normal. Appetite is stable, and she has been keeping hydrated. No nausea or vomiting. She does feel like the anxiety/depression is improving, and she has not felt overly panicky in 5 days.     REVIEW OF SYSTEMS:   Constitutional: Negative.   HENT: Negative.   Eyes: Negative.   Respiratory: Negative.   Cardiovascular: Negative.   Gastrointestinal: Negative.   Endocrine: Negative.   Genitourinary: Negative.   Skin: Negative.   Allergic/Immunologic: Negative.   Neurological: Negative.   Hematological: Negative.   Psychiatric/Behavioral: Negative.   All other systems are negative.    PFSH:  No new history.     TOBACCO USE:  History   Smoking Status     Former Smoker     Quit date: 02/2017   Smokeless Tobacco     Never Used       VITALS:  Vitals:    02/13/18 1433   BP: 128/86   Pulse: 60   Weight: 148 lb 2 oz (67.2 kg)     Wt Readings from Last 3 Encounters:   02/13/18 148 lb 2 oz (67.2 kg)   02/05/18 147 lb 8 oz (66.9 kg)   01/03/18 149 lb 9 oz (67.8 kg)       PHYSICAL EXAM:  Constitutional: Patient is oriented to person, place, and time. Patient appears well-developed and well-nourished. No distress.   Cardiovascular: Normal rate.  Pulmonary/Chest: Effort normal. No respiratory distress.   Neurological: Patient is alert and oriented to person, place, and time.  The patient has good eye contact.  No psychomotor retardation or stereotypical behaviors.  Speech is regular rate, regular rhythm, adequate responses.  Mood is stable and affect is congruent mood.  No suicidal or homicidal intent.  No hallucination.      Results for orders placed or performed in visit on 02/13/18   HM2(CBC w/o Differential)   Result Value Ref Range    WBC 4.3 4.0 - 11.0 thou/uL    RBC 4.20  3.80 - 5.40 mill/uL    Hemoglobin 13.7 12.0 - 16.0 g/dL    Hematocrit 41.1 35.0 - 47.0 %    MCV 98 80 - 100 fL    MCH 32.5 27.0 - 34.0 pg    MCHC 33.2 32.0 - 36.0 g/dL    RDW 11.0 11.0 - 14.5 %    Platelets 236 140 - 440 thou/uL    MPV 7.3 7.0 - 10.0 fL           ADDITIONAL HISTORY SUMMARIZED (2): None.  DECISION TO OBTAIN EXTRA INFORMATION (1): None.   RADIOLOGY TESTS (1): None.  LABS (1): Ordered labs today.  MEDICINE TESTS (1): None.  INDEPENDENT REVIEW (2 each): None.     IRianna, am scribing for and in the presence of, Dr. Campbell.    Lan GANDHI MD , personally performed the services described in this documentation, as scribed by Rianna Trejo in my presence, and it is both accurate and complete.    MEDICATIONS:  Current Outpatient Prescriptions   Medication Sig Dispense Refill     disulfiram (ANTABUSE) 250 mg tablet TAKE 1 TABLET (250 MG TOTAL) BY MOUTH DAILY. 30 tablet 0     FEXOFENADINE HCL (ALLEGRA ALLERGY ORAL) Take 1 tablet by mouth daily.       FLUoxetine (PROZAC) 10 MG capsule Take 1 capsule (10 mg total) by mouth daily. 30 capsule 0     gabapentin (NEURONTIN) 300 MG capsule Take one capsule at bedtime 30 capsule 0     MULTIVITAMIN (MULTIPLE VITAMIN ORAL) Tablet       SUMAtriptan (IMITREX) 50 MG tablet TAKE 1- 1/2 TABLET BY MOUTH EVERY 2 HOURS AS NEEDED FOR MIGRAINE. Max 200mg/24 hr 10 tablet 2     No current facility-administered medications for this visit.        Total data points: 1

## 2021-06-16 NOTE — PROGRESS NOTES
ASSESSMENT/PLAN:    Abnormal menses and Postcoital bleeding  -     Chlamydia trachomatis & Neisseria gonorrhoeae, Amplified Detection  -     Wet Prep, Vaginal    Cervical cancer screening  -     Gynecologic Cytology (PAP Smear)    Yeast vaginitis found on wet prep today  -     fluconazole (DIFLUCAN) 150 MG tablet; Take 1 tablet (150 mg total) by mouth once for 1 dose.  Dispense: 1 tablet; Refill: 0      Orders Placed This Encounter   Procedures     Chlamydia trachomatis & Neisseria gonorrhoeae, Amplified Detection     Order Specific Question:   Specimen Source?     Answer:   Endocervix     Wet Prep, Vaginal           CHIEF COMPLAINT:  Chief Complaint   Patient presents with     Abdominal Pain     menstrual cycle irregular and bleeding after intercourse       HISTORY OF PRESENT ILLNESS:  Joan is a 42 y.o. female presenting to the clinic today for irregular bleeding. She is seeing a gynecologist next week. However, she recently had a period when she was not expecting one. It lasted for about 6 days, and stopped, then she started bleeding one day later for another 6 days or so. She has also had recent post coital bleeding; this happened 3 times for her. She has never experienced this before. Her last PAP smear was 5 years ago, and was normal. She does not have a history of abnormal PAP smears. Birth control method is vasectomy.     Depression/Anxiety: She continues the fluoxetine 10mg and gabapentin 300mg. She feels like this is working well for her. She feels less anxious now. Her muscle aches and twitches seem improved since starting the gabapentin. She still notes some myalgias. She has not used alcohol in over 2 weeks. She says that she is smoking more, and attributes this to the anxiety.     REVIEW OF SYSTEMS:   Constitutional: Negative.   HENT: Negative.   Eyes: Negative.   Respiratory: Negative.   Cardiovascular: Negative.   Gastrointestinal: Negative.   Endocrine: Negative.   Musculoskeletal: Negative.    Skin: Negative.   Allergic/Immunologic: Negative.   Neurological: Negative.   Hematological: Negative.    All other systems are negative.    PFSH:  No new history.     TOBACCO USE:  History   Smoking Status     Former Smoker     Quit date: 02/2017   Smokeless Tobacco     Never Used       VITALS:  Vitals:    02/27/18 1338   BP: 124/76   Pulse: 72   Weight: 148 lb 2 oz (67.2 kg)     Wt Readings from Last 3 Encounters:   02/27/18 148 lb 2 oz (67.2 kg)   02/13/18 148 lb 2 oz (67.2 kg)   02/05/18 147 lb 8 oz (66.9 kg)       PHYSICAL EXAM:  Constitutional: Patient is oriented to person, place, and time. Patient appears well-developed and well-nourished. No distress.   Cardiovascular: Normal rate.  Pulmonary/Chest: Effort normal. No respiratory distress.   Neurological: Patient is alert and oriented to person, place, and time.  Pelvic:  Normal external genitalia with Normal vulva.  Normal vagina with no polyps or lesions and with physiologic discharge.  Normal cervix with normal mucosa and without CMT.  No adnexal masses      Results for orders placed or performed in visit on 02/27/18   Wet Prep, Vaginal   Result Value Ref Range    Yeast Result Yeast Seen (!) No yeast seen    Trichomonas No Trichomonas seen No Trichomonas seen    Clue Cells, Wet Prep No Clue cells seen No Clue cells seen         ADDITIONAL HISTORY SUMMARIZED (2): None.  DECISION TO OBTAIN EXTRA INFORMATION (1): None.   RADIOLOGY TESTS (1): None.  LABS (1): Ordered labs today.  MEDICINE TESTS (1): None.  INDEPENDENT REVIEW (2 each): None.     I, Rianna Trejo, am scribing for and in the presence of, Dr. Campbell.    Lan GANDHI MD , personally performed the services described in this documentation, as scribed by Rianna Trejo in my presence, and it is both accurate and complete.    MEDICATIONS:  Current Outpatient Prescriptions   Medication Sig Dispense Refill     disulfiram (ANTABUSE) 250 mg tablet TAKE 1 TABLET (250  MG TOTAL) BY MOUTH DAILY. 30 tablet 0     FEXOFENADINE HCL (ALLEGRA ALLERGY ORAL) Take 1 tablet by mouth daily.       FLUoxetine (PROZAC) 10 MG capsule Take 1 capsule (10 mg total) by mouth daily. 30 capsule 0     gabapentin (NEURONTIN) 300 MG capsule Take one capsule at bedtime 30 capsule 0     MULTIVITAMIN (MULTIPLE VITAMIN ORAL) Tablet       SUMAtriptan (IMITREX) 50 MG tablet TAKE 1- 1/2 TABLET BY MOUTH EVERY 2 HOURS AS NEEDED FOR MIGRAINE. Max 200mg/24 hr 10 tablet 2     fluconazole (DIFLUCAN) 150 MG tablet Take 1 tablet (150 mg total) by mouth once for 1 dose. 1 tablet 0     No current facility-administered medications for this visit.        Total data points: 1

## 2021-06-17 NOTE — PROGRESS NOTES
ASSESSMENT/PLAN:  Painful urination  42-year-old female presents with 3 days of dysuria.  She took Azo which made the urinalysis difficult to interpret today.  I will treat her with sulfamethoxazole trimethoprim.  Encouraged fluid hydration.  Await urine culture for further directions.  -     Cancel: Urinalysis-UC if Indicated  -     Urine,Microscopic  -     Culture, Urine  -     sulfamethoxazole-trimethoprim (SEPTRA DS) 800-160 mg per tablet; Take 1 tablet by mouth 2 (two) times a day for 3 days.  Dispense: 6 tablet; Refill: 0    Depression, Anxiety  Doing very well on fluoxetine and gabapentin.  Will increase gabapentin to 300 mg 3 times a day.  -     gabapentin (NEURONTIN) 300 MG capsule; Take 1 capsule (300 mg total) by mouth 3 (three) times a day.  Dispense: 270 capsule; Refill: 3    Alcohol use  Continue with Antabuse and gabapentin.  Gabapentin will be increased to 300 mg 3 times a day.  Last alcohol use was February 2018 (2 months ago)  -     gabapentin (NEURONTIN) 300 MG capsule; Take 1 capsule (300 mg total) by mouth 3 (three) times a day.  Dispense: 270 capsule; Refill: 3    Menorrhagia  Better with oral contraceptives.  She had a normal pelvic ultrasound.  She was seen by gynecology recently  -     JUNEL 1/20, 21, 1-20 mg-mcg per tablet; Take 1 tablet by mouth daily.  Dispense: 84 tablet; Refill: 3      Orders Placed This Encounter   Procedures     Culture, Urine     Urine,Microscopic           CHIEF COMPLAINT:  Chief Complaint   Patient presents with     med check     painful urination     x 3 days       HISTORY OF PRESENT ILLNESS:  Joan is a 42 y.o. female presenting to the clinic today for a follow up for depression/anxiety. She is currently taking fluoxetine 10mg daily. She is also taking gabapentin 300mg twice daily. She feels the medications are working well for her. She is now tolerating the gabapentin well, and has not had any more joint aches or twitching. She is still taking the  disulfiram, and this is continuing to help her with the alcohol abuse. She has not had any alcohol since 2018. She has also started OCPs, and she has felt like this has helped level out her mood.     Little interest or pleasure in doing things: Several days  Feeling down, depressed, or hopeless: Not at all  Trouble falling or staying asleep, or sleeping too much: Several days  Feeling tired or having little energy: Several days  Poor appetite or overeating: Several days  Feeling bad about yourself - or that you are a failure or have let yourself or your family down: Not at all  Trouble concentrating on things, such as reading the newspaper or watching television: Not at all  Moving or speaking so slowly that other people could have noticed. Or the opposite - being so fidgety or restless that you have been moving around a lot more than usual: Not at all  Thoughts that you would be better off dead, or of hurting yourself in some way: Not at all  PHQ-9 Total Score: 4  If you checked off any problems, how difficult have these problems made it for you to do your work, take care of things at home, or get along with other people?: Somewhat difficult    Feeling nervous, anxious or on edge: 2  Not being able to stop or control worry: 1  Worrying too much about different things: 2  Trouble relaxin  Being so restless that is is hard to sit still: 0  Becoming easily annnoyed or irritable: 0  Feeling afraid as if something awful might happen: 0  SADI-7 Total: 5  How difficult did these problems make it for you to do your work, take care of things at home or get along with other people? : Somewhat difficult    Dysuria: She has had dysuria for the past 3 days. She has dysuria with feeling she is not completely emptying her bladder. She is making sure to stay hydrated. Denies fevers, chills, back pain. She did take Azo this morning this morning for her symptoms.      Gas: She has had more flatus than usual in the past  few weeks. She has not had any changes in diet. She is not sure what is causing the change for her. She does not feel any upper abdominal bloating or excessive burping. Denies any abdominal pain.     Abdominal Pain: She went to the [facility] ED on 3/3/2018 for worsening abdominal/pelvic pain. She was also having irregular vaginal bleeding. She had US and abdominal CT that was normal. She also had labs and UA that were normal. She was given Toradol in the ED with good relief of her pain. She did see an OBGYN, and was started on OCP;s, and she has felt that these have helped. Since then, she has not had recurrence of the pelvic pain or bleeding.     REVIEW OF SYSTEMS:   Constitutional: Negative.   HENT: Negative.   Eyes: Negative.   Respiratory: Negative.   Cardiovascular: Negative.   Gastrointestinal: Negative.   Endocrine: Negative.   Musculoskeletal: Negative.   Skin: Negative.   Allergic/Immunologic: Negative.   Neurological: Negative.   Hematological: Negative.   Psychiatric/Behavioral: Negative.   All other systems are negative.    PFSH:  No new history.     TOBACCO USE:  History   Smoking Status     Former Smoker     Quit date: 02/2017   Smokeless Tobacco     Never Used       VITALS:  Vitals:    04/19/18 1503   BP: 100/72   Patient Site: Left Arm   Patient Position: Sitting   Cuff Size: Adult Regular   Pulse: 68   Weight: 151 lb 3 oz (68.6 kg)     Wt Readings from Last 3 Encounters:   04/19/18 151 lb 3 oz (68.6 kg)   03/03/18 140 lb (63.5 kg)   02/27/18 148 lb 2 oz (67.2 kg)       PHYSICAL EXAM:  Constitutional: Patient is oriented to person, place, and time. Patient appears well-developed and well-nourished. No distress.   Abdominal: Soft. Patient exhibits no distension and no mass. There is no tenderness. There is no rebound and no guarding.   Neurological: Patient is alert and oriented to person, place, and time.   Skin: Skin is warm and dry. No rash noted. Patient is not diaphoretic. No erythema. No  pallor.  Back: No tenderness to percussion to bilateral CVA.     Results for orders placed or performed in visit on 04/19/18   Urine,Microscopic   Result Value Ref Range    Bacteria, UA Moderate (!) None Seen hpf    RBC, UA 5-10 (!) None Seen, 0-2 hpf    WBC, UA 25-50 (!) None Seen, 0-5 hpf    Squam Epithel, UA 0-5 None Seen, 0-5 lpf    WBC Clumps Present (!) None Seen           ADDITIONAL HISTORY SUMMARIZED (2): Reviewed and summarized ED note from 3/3/2018.  DECISION TO OBTAIN EXTRA INFORMATION (1): None.   RADIOLOGY TESTS (1): None.  LABS (1): Ordered labs today.  MEDICINE TESTS (1): None.  INDEPENDENT REVIEW (2 each): None.     IRianna, am scribing for and in the presence of, Dr. Campbell.    ILan MD , personally performed the services described in this documentation, as scribed by Rianna Trejo in my presence, and it is both accurate and complete.    MEDICATIONS:  Current Outpatient Prescriptions   Medication Sig Dispense Refill     disulfiram (ANTABUSE) 250 mg tablet TAKE 1 TABLET (250 MG TOTAL) BY MOUTH DAILY. 30 tablet 0     FEXOFENADINE HCL (ALLEGRA ALLERGY ORAL) Take 1 tablet by mouth daily.       FLUoxetine (PROZAC) 10 MG capsule TAKE 1 CAPSULE (10 MG TOTAL) BY MOUTH DAILY. 90 capsule 2     gabapentin (NEURONTIN) 300 MG capsule Take 1 capsule (300 mg total) by mouth 3 (three) times a day. 270 capsule 3     JUNEL 1/20, 21, 1-20 mg-mcg per tablet Take 1 tablet by mouth daily. 84 tablet 3     MULTIVITAMIN (MULTIPLE VITAMIN ORAL) Tablet       SUMAtriptan (IMITREX) 50 MG tablet TAKE 1- 1/2 TABLET BY MOUTH EVERY 2 HOURS AS NEEDED FOR MIGRAINE. Max 200mg/24 hr 10 tablet 2     FLUoxetine (PROZAC) 10 MG capsule Take 1 capsule (10 mg total) by mouth daily. 30 capsule 0     sulfamethoxazole-trimethoprim (SEPTRA DS) 800-160 mg per tablet Take 1 tablet by mouth 2 (two) times a day for 3 days. 6 tablet 0     traZODone (DESYREL) 50 MG tablet        valACYclovir  (VALTREX) 1000 MG tablet TAKE 2 TABS TWICE DAILY X 1 DAY ONLY. START AT EARLIEST SIGN OF OUTBREAK  1     No current facility-administered medications for this visit.        Total data points: 3

## 2021-06-17 NOTE — PROGRESS NOTES
ASSESSMENT/PLAN:  1.  Hospital discharge follow-up for Acute pyelonephritis  Doing better   Finished antibiotic with last dose yesterday  Headaches are improving but has tramadol given to her from hospital to use as needed  Still feeling a bit tired  I advised to continue with fluid hydration, OTC analgesics, nutritional support, and time  F/u as needed  Recheck UC in 2-3 wks (lab only appointment)    I have reconciled all medications.     2.  Soft tissue swelling above left eyebrow  Likely due to acne  Monitor for now    3.  Chronic conditions are stable  Depression, anxiety:  Continue with fluoxetine and gabapentin TID  Alcohol use:  Continue with antabuse and gabapentin  Menorrhagia:  Continue with oral contraceptives    CHIEF COMPLAINT:  Chief Complaint   Patient presents with     Follow-up     Hospital 4/22-4/24, headache and acute pyelonephritis       HISTORY OF PRESENT ILLNESS:  Joan is a 42 y.o. female presenting to the clinic today for a follow up for hospitalization from 4/22-4/24/2018 at [facility] for pyelonephritis. She was having urinary symptoms, and was initiated on Bactrim on 4/19/2018 for UTI. She was not having resolution of her symptoms, and started to have back/flank pain. She presented to the [facility]. It was found that her urine culture was resistant to the bactrim. She was febrile with temp of 104, and was tachycardic. She was then admitted to the [facility] for pyelonephritis. She was started on IV antibiotics. She had negative blood cultures done. She was having headaches in the hospital after initiation of the IV antibiotics. She showed improvement, and was discharged with oral cefpodoxine.      Today, she is doing better. No more urinary symptoms, fevers, chills. She finished the cefpodoxine this morning. She is still having some soreness of her back, but this is improving. She is feeling fatigued since hospital discharge. She is noting some area of swelling above the left eye  since she has started the antibiotics. Denies nausea or vomiting. She is still having some headaches, and will take ibuprofen for them. If the ibuprofen does not help the headaches in a couple of hours, she will take tramadol, and this takes care of the headache.     REVIEW OF SYSTEMS:   Constitutional: Negative.   HENT: Negative.   Eyes: Negative.   Respiratory: Negative.   Cardiovascular: Negative.   Gastrointestinal: Negative.   Endocrine: Negative.   Genitourinary: Negative.    Skin: Negative.   Allergic/Immunologic: Negative.   Neurological: Negative.   Hematological: Negative.   Psychiatric/Behavioral: Negative.   All other systems are negative.    PFSH:  No new history.     TOBACCO USE:  History   Smoking Status     Current Every Day Smoker     Types: Cigarettes     Last attempt to quit: 02/2017   Smokeless Tobacco     Never Used     Comment: 1-2 cigarettes per day       VITALS:  Vitals:    05/03/18 1458   BP: 92/66   Patient Site: Left Arm   Patient Position: Sitting   Cuff Size: Adult Regular   Pulse: 68   Weight: 146 lb 11.2 oz (66.5 kg)     Wt Readings from Last 3 Encounters:   05/03/18 146 lb 11.2 oz (66.5 kg)   04/24/18 154 lb (69.9 kg)   04/19/18 151 lb 3 oz (68.6 kg)         PHYSICAL EXAM:  Constitutional: Patient is oriented to person, place, and time. Patient appears well-developed and well-nourished. No distress.   Cardiovascular: Normal rate.  Pulmonary/Chest: Effort normal. No respiratory distress.   Neurological: Patient is alert and oriented to person, place, and time.      Results for orders placed or performed during the hospital encounter of 04/22/18   Culture, Blood   Result Value Ref Range    Anaerobic Blood Culture Bottle No Growth No Growth, No organisms seen, bottle returned to instrument, Specimen not received    Aerobic Blood Culture Bottle Specimen not received No Growth, No organisms seen, bottle returned to instrument, Specimen not received   HM2(CBC w/o Differential)   Result  Value Ref Range    WBC 14.1 (H) 4.0 - 11.0 thou/uL    RBC 3.50 (L) 3.80 - 5.40 mill/uL    Hemoglobin 11.4 (L) 12.0 - 16.0 g/dL    Hematocrit 33.4 (L) 35.0 - 47.0 %    MCV 95 80 - 100 fL    MCH 32.6 27.0 - 34.0 pg    MCHC 34.1 32.0 - 36.0 g/dL    RDW 11.8 11.0 - 14.5 %    Platelets 178 140 - 440 thou/uL    MPV 8.5 8.5 - 12.5 fL   Basic Metabolic Panel   Result Value Ref Range    Sodium 137 136 - 145 mmol/L    Potassium 4.1 3.5 - 5.0 mmol/L    Chloride 111 (H) 98 - 107 mmol/L    CO2 18 (L) 22 - 31 mmol/L    Anion Gap, Calculation 8 5 - 18 mmol/L    Glucose 106 70 - 125 mg/dL    Calcium 7.9 (L) 8.5 - 10.5 mg/dL    BUN 8 8 - 22 mg/dL    Creatinine 0.70 0.60 - 1.10 mg/dL    GFR MDRD Af Amer >60 >60 mL/min/1.73m2    GFR MDRD Non Af Amer >60 >60 mL/min/1.73m2           ADDITIONAL HISTORY SUMMARIZED (2): Reviewed and summarized hospital discharge summary from 4/24/2018 regarding pyelonephritis.  DECISION TO OBTAIN EXTRA INFORMATION (1): None.   RADIOLOGY TESTS (1): CT abd/pelvis.  LABS (1): reviewed lab results.  MEDICINE TESTS (1): None.  INDEPENDENT REVIEW (2 each): None.     Rianna GANDHI, am scribing for and in the presence of, Lan Montejo MD , personally performed the services described in this documentation, as scribed by Rianna Trejo in my presence, and it is both accurate and complete.    MEDICATIONS:  Current Outpatient Prescriptions   Medication Sig Dispense Refill     disulfiram (ANTABUSE) 250 mg tablet Take 250 mg by mouth daily.       FLUoxetine (PROZAC) 10 MG capsule Take 10 mg by mouth at bedtime.       gabapentin (NEURONTIN) 300 MG capsule Take 1 capsule (300 mg total) by mouth 3 (three) times a day. 270 capsule 3     JUNEL 1/20, 21, 1-20 mg-mcg per tablet Take 1 tablet by mouth daily. (Patient taking differently: Take 1 tablet by mouth at bedtime. ) 84 tablet 3     SUMAtriptan (IMITREX) 50 MG tablet Take 75 mg by mouth every 2 (two) hours as needed  for migraine (max 200mg/24hr).       traMADol (ULTRAM) 50 mg tablet Take 1 tablet (50 mg total) by mouth every 6 (six) hours as needed for pain. 8 tablet 0     traMADol (ULTRAM) 50 mg tablet Take 1 tablet (50 mg total) by mouth 2 (two) times a day. 30 tablet 0     valACYclovir (VALTREX) 1000 MG tablet Take 2,000 mg by mouth 2 (two) times a day as needed. For one day only       fexofenadine (ALLEGRA) 180 MG tablet Take 180 mg by mouth daily as needed.       No current facility-administered medications for this visit.        Total data points: 4

## 2021-06-20 NOTE — PROGRESS NOTES
Audiology Report    Referring Provider:  Dr. Mendez    History:  Joan Christianson is seen in conjunction with ENT appointment today. She reports a sinus infection about six weeks ago which reduced hearing bilaterally. She reports her sinus infection resolved about 3 weeks ago, but she continues to experience hearing loss, aural fullness, and otalgia in the right ear. She reports brief, occasional tinnitus in the right ear. She reports difficulty understanding the television when her left ear is against her pillow. She reports her grandmother and daughter both have hearing loss.       Results:     Left Ear Right Ear   Otoscopy clear canals clear canals   Pure Tone Audiometry normal hearing   mild conductive hearing loss   Word Recognition excellent excellent   Tympanometry normal (Type A)  flat (Type B)  with normal ear canal volume     Transducer: Circumaural headphones    Reliability was good  and there was good  SRT to PTA agreement.       Plan:  The patient is returned to ENT for follow up.  She should return for retesting with ENT recommendation.     Please see audiogram under  media  and  audiogram  in the patient s chart.     Angie Sher, CCC-A  [place] Licensed Audiologist #[id number]

## 2021-06-20 NOTE — PROGRESS NOTES
ASSESSMENT/PLAN:  Sinusitis  Completed 7 days of amoxicillin and 10 days of cefdinir  Will extend prednisone course  Stop tobacco  F/u in 2 wks if still not better  -     predniSONE (DELTASONE) 20 MG tablet; 60mg x 3 d, 40mg x 3d, 20mg x 3d    Tobacco use  Cessation counseled    Mild recurrent major depression (H)  Stable but she asked about extended use of OCP.  We discussed risks and benefits of extended OCP use.    SUBJECTIVE:    Joan Christianson is a 42 y.o. female who came in today for f/u sinusitis.  A month ago the patient developed stuffiness of the face and thought it was allergy.  She tried Allegra and Claritin without relief.  She then developed rhinorrhea, facial pressure, ear pain, cough, postnasal drip.  She called into the clinic was given amoxicillin for about a week.  After a week of amoxicillin she had no improvement instead worsening symptoms which led her to the emergency room.  There she was given 10 days of Ceftin near for twice daily dosing +5 days of prednisone.  She is here for follow-up.  She says overall she feels much better and has clear sensation from her nose but still has ear pain with the left ear worse than the right ear.  Hearing is still not back to baseline.  She also feels soreness of the face along the maxillary and frontal area.  Still has productive cough.  She has not had a fever.  She is smoking but less.  She smokes about 1-2 sticks per day.    The patient asked about extended use of birth control to help with regulating her mood.  She does have major depression and is on fluoxetine.  She also takes gabapentin.  She is on oral contraceptives.  Depression and anxiety overall is stable.    Review of Systems (except those mentioned above)  Constitutional: Negative.   HENT: Negative.   Eyes: Negative.   Respiratory: Negative.   Cardiovascular: Negative.   Gastrointestinal: Negative.   Endocrine: Negative.   Genitourinary: Negative.   Musculoskeletal: Negative.   Skin: Negative.    Allergic/Immunologic: Negative.   Neurological: Negative.   Hematological: Negative.   Psychiatric/Behavioral: Negative.     Patient Active Problem List    Diagnosis Date Noted     Acute pyelonephritis      Major depressive disorder      Depression 04/19/2018     Anxiety 04/19/2018     ETOH abuse 04/19/2018     Allergies   Allergen Reactions     Hydrocodone-Acetaminophen Swelling     Current Outpatient Prescriptions   Medication Sig Dispense Refill     disulfiram (ANTABUSE) 250 mg tablet TAKE 1 TABLET BY MOUTH EVERY DAY 90 tablet 0     fexofenadine (ALLEGRA) 180 MG tablet Take 180 mg by mouth daily as needed.       gabapentin (NEURONTIN) 300 MG capsule Take 1 capsule (300 mg total) by mouth 3 (three) times a day. 270 capsule 3     SUMAtriptan (IMITREX) 50 MG tablet TAKE 1/2 TABLET BY MOUTH EVERY 2 HOURS AS NEEDED FOR MIGRAINE. MAX 200MG/24 HR 10 tablet 2     traMADol (ULTRAM) 50 mg tablet Take 1 tablet (50 mg total) by mouth every 6 (six) hours as needed for pain. 8 tablet 0     valACYclovir (VALTREX) 1000 MG tablet Take 2,000 mg by mouth 2 (two) times a day as needed. For one day only       predniSONE (DELTASONE) 20 MG tablet 60mg x 3 d, 40mg x 3d, 20mg x 3d 18 tablet 0     traMADol (ULTRAM) 50 mg tablet TAKE 1 TABLET BY MOUTH TWICE A DAY 30 tablet 0     No current facility-administered medications for this visit.      No past medical history on file.  Past Surgical History:   Procedure Laterality Date     BREAST BIOPSY Left 03/2012     [place] REMOVAL OF TONSILS,<12 Y/O      Description: Tonsillectomy;  Recorded: 07/29/2009;     Social History     Social History     Marital status:      Spouse name: N/A     Number of children: N/A     Years of education: N/A     Social History Main Topics     Smoking status: Current Every Day Smoker     Types: Cigarettes     Last attempt to quit: 02/2017     Smokeless tobacco: Never Used      Comment: 1-2 cigarettes per day     Alcohol use No      Comment: Previous alcohol  use. Sober now.     Drug use: No     Sexual activity: Not Asked     Other Topics Concern     None     Social History Narrative     Family History   Problem Relation Age of Onset     Breast cancer Mother 50     DCIS     Lung cancer Mother 55     Breast cancer Cousin 39     paternal first-cousin     Pulmonary embolism Father 60     Heart attack Paternal Grandfather 70     Melanoma Maternal Aunt      Cancer Paternal Aunt      late-onset, type unknown     Melanoma Maternal Grandfather      Cancer Other      great-uncle (maternal grandfather's brother), type unknown     Cancer Other      great-aunt (maternal grandfather's sister), type unknown         OBJECTIVE:    Vitals:    09/05/18 1532   BP: 110/80   Pulse: 72   Temp: 97.9  F (36.6  C)   TempSrc: Oral   Weight: 144 lb 1 oz (65.3 kg)     Body mass index is 24.73 kg/(m^2).    Physical Exam:  Constitutional: Patient was oriented to person, place, and time. Patient appeared well-developed and well-nourished. No distress.   Head: Normocephalic and atraumatic.   Right Ear: External ear normal.  Slightly erythematous TM but mobile to pneumatic insufflation  Left Ear: External ear normal.  Erythematous TM that is not mobile to pneumatic insufflation  Nose: Nose normal.   Mouth/Throat: Oropharynx was clear and moist. No oropharyngeal exudate.   Eyes: Conjunctivae and EOM were normal. Pupils were equal, round, and reactive to light. Right eye exhibited no discharge. Left eye exhibited no discharge. No scleral icterus.   Neck: Neck supple. No JVD present. No tracheal deviation present. No thyromegaly present.   Lymphadenopathy:  Patient has no cervical adenopathy.   Cardiovascular: Normal rate, regular rhythm, normal heart sounds and intact distal pulses. No murmur heard.   Pulmonary/Chest: Effort normal and breath sounds normal. No stridor. No respiratory distress. Patient had no wheezes, no rales, exhibits no tenderness.   Skin: Skin was warm and dry. No rash noted. Patient  was not diaphoretic. No erythema. No pallor.

## 2021-06-20 NOTE — PROGRESS NOTES
ASSESSMENT/PLAN:  Exertional dyspnea  42-year-old female with a history of cigarette smoking presents today for a emergency department visit follow-up.  She is almost done with doxycycline antibiotic that was given to her by the emergency department for inflammatory lung disease.  She still complains of exertional dyspnea, fatigue, headache.  I have asked her to continue with supportive cares for now.  If she is not better in the next 2-4 weeks, I would like to refer her to pulmonology due to the exertional dyspnea, history of cigarette smoking, and recent CT scan findings of bilateral lung nodules.    Lung nodule  CT scan on September 19, 2018 described subtle centrilobular groundglass nodules seen bilaterally, mild in burden, most pronounced in the left upper lobe and right lower lobes.  Suspect this may be benign but if her exertional dyspnea is not better than I would recommend that she follows up with pulmonology    Thyroid nodule  Status post fine-needle aspiration and biopsy today.  Medical cytology is pending    Cigarette smoker  Cessation has been counseled.  Patient stated that since she has not been feeling well, she has not been smoking.    History of alcohol use  She stopped drinking alcohol March 2018 (6 months ago)    Sinusitis and upper respiratory tract infection  The patient also completed a course of antibiotic for sinus infection.  She is being followed by ENT.  Her examination today still reveals slightly erythematous tympanic membrane bilaterally.  She has an appointment in 4 days with ENT      SUBJECTIVE:    Joan Christianson is a 42 y.o. female who came in today for follow-up of her emergency department visit.  The patient was seen on September 19, 2018 for exertional dyspnea.  She had a DVT trip sounds that was negative.  A CT angiogram was negative for pulmonary embolism but did make tension of inflammatory lung disease.  As result, she was sent home with doxycycline.  She is here today  stating that she still feels somewhat fatigued, still with exertional dyspnea, and headache.  She is slightly better than she was when she was in the emergency department.    The CT angiogram done in the emergency department also revealed lung nodule.  Patient has been a chronic smoker.  Recently because she has not been feeling well, she has not been smoking cigarettes.  During this time, she denied fever, chills, cough.  Patient completed an antibiotic for sinusitis.  She has been followed by ENT.  She stated that she has an appointment with ENT in the next 4 days.    The CT angiogram done in the emergency department also revealed thyroid nodule.  Patient just had her fine-needle aspiration and biopsy done today.  The results are pending.  TSH was normal.    ED notes, laboratory results, ultrasound, CT angiogram have all been reviewed today.  Medication reconciliation was also done.    Review of Systems (except those mentioned above)  Constitutional: Negative.   HENT: Negative.   Eyes: Negative.   Respiratory: Negative.   Cardiovascular: Negative.   Gastrointestinal: Negative.   Endocrine: Negative.   Genitourinary: Negative.   Musculoskeletal: Negative.   Skin: Negative.   Allergic/Immunologic: Negative.   Neurological: Negative.   Hematological: Negative.   Psychiatric/Behavioral: Negative.     Patient Active Problem List    Diagnosis Date Noted     Acute pyelonephritis      Major depressive disorder      Depression 04/19/2018     Anxiety 04/19/2018     ETOH abuse, stopped March 2018 04/19/2018     Allergies   Allergen Reactions     Hydrocodone      Current Outpatient Prescriptions   Medication Sig Dispense Refill     albuterol (PROVENTIL) 2.5 mg /3 mL (0.083 %) nebulizer solution Take 3 mL (2.5 mg total) by nebulization every 6 (six) hours as needed for wheezing. 75 mL 12     disulfiram (ANTABUSE) 250 mg tablet TAKE 1 TABLET BY MOUTH EVERY DAY 90 tablet 0     fexofenadine (ALLEGRA) 180 MG tablet Take 180 mg by  mouth daily as needed.       gabapentin (NEURONTIN) 300 MG capsule Take 1 capsule (300 mg total) by mouth 3 (three) times a day. 270 capsule 3     JUNEL 1/20, 21, 1-20 mg-mcg per tablet        omeprazole (PRILOSEC) 20 MG capsule Take 1 capsule (20 mg total) by mouth daily. 30 capsule 1     PROAIR HFA 90 mcg/actuation inhaler        SUMAtriptan (IMITREX) 50 MG tablet TAKE 1/2 TABLET BY MOUTH EVERY 2 HOURS AS NEEDED FOR MIGRAINE. MAX 200MG/24 HR 10 tablet 2     traMADol (ULTRAM) 50 mg tablet Take 1 tablet (50 mg total) by mouth every 6 (six) hours as needed for pain. 8 tablet 0     valACYclovir (VALTREX) 1000 MG tablet Take 2,000 mg by mouth 2 (two) times a day as needed. For one day only       predniSONE (DELTASONE) 20 MG tablet 60mg x 3 d, 40mg x 3d, 20mg x 3d 18 tablet 0     No current facility-administered medications for this visit.      No past medical history on file.  Past Surgical History:   Procedure Laterality Date     BREAST BIOPSY Left 03/2012     [place] REMOVAL OF TONSILS,<13 Y/O      Description: Tonsillectomy;  Recorded: 07/29/2009;     Social History     Social History     Marital status:      Spouse name: N/A     Number of children: N/A     Years of education: N/A     Social History Main Topics     Smoking status: Current Every Day Smoker     Types: Cigarettes     Last attempt to quit: 02/2017     Smokeless tobacco: Never Used      Comment: 1-2 cigarettes per day     Alcohol use No      Comment: Previous alcohol use. Sober now.     Drug use: No     Sexual activity: Not Asked     Other Topics Concern     None     Social History Narrative     Family History   Problem Relation Age of Onset     Breast cancer Mother 50     DCIS     Lung cancer Mother 55     Breast cancer Cousin 39     paternal first-cousin     Pulmonary embolism Father 60     Heart attack Paternal Grandfather 70     Melanoma Maternal Aunt      Cancer Paternal Aunt      late-onset, type unknown     Melanoma Maternal Grandfather       Cancer Other      great-uncle (maternal grandfather's brother), type unknown     Cancer Other      great-aunt (maternal grandfather's sister), type unknown         OBJECTIVE:    Vitals:    09/27/18 1520   BP: 118/80   Pulse: 69   Resp: 12   Temp: 98.7  F (37.1  C)   SpO2: 98%   Weight: 147 lb 5 oz (66.8 kg)     Body mass index is 25.29 kg/(m^2).    Physical Exam:  Constitutional: Patient was oriented to person, place, and time. Patient appeared well-developed and well-nourished. No distress.   Head: Normocephalic and atraumatic.   Right Ear: External ear normal.  Erythematous TM  Left Ear: External ear normal.  Erythematous TM  Nose: Nose normal.   Mouth/Throat: Oropharynx was clear and moist. No oropharyngeal exudate.   Eyes: Conjunctivae and EOM were normal. Pupils were equal, round, and reactive to light. Right eye exhibited no discharge. Left eye exhibited no discharge. No scleral icterus.   Neck: Neck supple. No JVD present. No tracheal deviation present. No thyromegaly present.   Lymphadenopathy:  Patient has no cervical adenopathy.   Cardiovascular: Normal rate, regular rhythm, normal heart sounds and intact distal pulses. No murmur heard.   Pulmonary/Chest: Effort normal and breath sounds normal. No stridor. No respiratory distress. Patient had no wheezes, no rales, exhibits no tenderness.     Results for orders placed or performed in visit on 09/21/18   Thyroid Cascade   Result Value Ref Range    TSH 1.47 0.30 - 5.00 uIU/mL

## 2021-06-20 NOTE — PROGRESS NOTES
HISTORY OF PRESENT ILLNESS  Patient is a 41 y/o female who presents to the clinic at the request of Dr. Shannan Cervantes for evaluation of ear pain.  About 6 weeks ago patient had a bad sinus infection for which she had otalgia, aural fullness, purulent nasal discharge, and fever.  Patient received 2 rounds of antibiotics and steroids before symptoms resolved.  However patient states her right ear still feels plugged and muffled hearing.  She does note some popping and cracking.  Left ear is back to normal hearing.  Patient denies true vertigo, odonyphagia, dysphagia, or other major symptoms at this time.    REVIEW OF SYSTEMS  Review of Systems: a 10-system review was performed. Pertinent positives are noted in the HPI and on a separate scanned document in the chart.    PMH, PSH, FH and SH has documented in the EHR.      EXAM    CONSTITUTIONAL  General Appearance:  Normal, well developed, well nourished, no obvious distress  Ability to Communicate:  communicates appropriately.    HEAD AND FACE  Appearance and Symmetry:  Normal, no scalp or facial scarring or suspicious lesions.  Paranasal sinuses tenderness:  Normal, Paranasal sinuses non tender    EARS  Clinical speech reception threshold:  Normal, able to hear normal speech.  Auricle:  Normal, Auricles without scars, lesions, masses.  External auditory canal:  Normal, External auditory canal normal.  Tympanic membrane:  Right serous effusion. Tympanic membranes normal without swelling or erythema.  Tympanic membrane mobility:  Normal, Normal tympanic membrane mobility.    NOSE (speculum or scope)  Architecture:  Normal, Grossly normal external nasal architecture with no masses or lesions.  Mucosa:  Normal mucosa, No polyps or masses.  Septum:  Normal, Septum non-obstructing.  Turbinates:  Normal, No turbinate abnormalities    ORAL CAVITY AND OROPHARYNX  Lips:  Normal.  Dental and gingiva:  Normal, No obvious dental or gingival disease.  Mucosa:  Normal, Moist mucous  membranes.  Tongue:  Normal, Tongue mobile with no mucosal abnormalities  Hard and soft palate:  Normal, Hard and soft palate without cleft or mucosal lesions.  Oral pharynx:  Normal, Posterior pharynx without lesions or remarkable asymmetry.  Saliva:  Normal, Clear saliva.  Masses:  Normal, No palpable masses or pathologically enlarged lymph nodes.    NECK  Masses/lymph nodes:  Normal, No worrisome neck masses or lymph nodes.  Salivary glands:  Normal, Parotid and submandibular glands.  Trachea and larynx position:  Normal, Trachea and larynx midline.  Thyroid:  Normal, No thyroid abnormality.  Tenderness:  Normal, No cervical tenderness.  Suppleness:  Normal, Neck supple    NEUROLOGICAL  Speech pattern:  Normal, Proasaic    RESPIRATORY  Symmetry and Respiratory effort:  Normal, Symmetric chest movement and expansion with no increased intercostal retractions or use of accessory muscles.     IMPRESSION  Right serous otitis media.  Patient has been on two rounds of steroids so would not recommend this as treatment at this time.  We discussed that we could do a myringotomy vs watch and wait.  We discussed that fluid can be present for up to 3 months and it's reasonable to try Flonase which may or may not help clear the fluid from her right ear.  Given the fact patient has heard some popping and cracking, I would suspect the fluid will drain spontaneously.  Patient elected to try Flonase and wait a few more weeks to see if ear drains on its own.    RECOMMENDATION  Patient will try Flonase for 2-3 weeks and see if fluid resolves spontaneously.  If patient continues to have symptoms over the next 2-4 weeks, she may call the clinic and we can set up a myringotomy at that time.  Patient with good understanding and in agreement of plan.    Seen in conjunction with NOE Reynolds MD

## 2021-06-20 NOTE — PROGRESS NOTES
ASSESSMENT:  1. Subacute maxillary sinusitis  - JIC LAV    2. Thyroid nodule  - US Thyroid; Future  - Thyroid Cascade      PLAN:  Discussed the needs for the patient as well as the symptoms that she has come with.  I think that the upper respiratory tract symptoms as a result of the sinus congestion.  She continues to have some tenderness noted on the right maxillary sinus.  She also does have postnasal drip.  I did encourage her to continue with the medications that she has been prescribed with.  Discussed symptom control measures that she can also add.  Encouraged her to use some Flonase and no sprays to lessen the congestion.  She can also start taking her Allegra medication with the decongestants as needed.  She should finish antibiotics and if in about 5 days she still has some concerns she is encouraged to follow for possible CT scan of the sinuses.  Due to the thyroid nodularity noted on the CT scan, measured at 2.5 cm, according to the recommendations she is above 35 and the nodules more than 1.5.  I did put an ultrasound to evaluate for this.  Also did put in thyroid cascade for her.    Orders Placed This Encounter   Procedures     US Thyroid     Standing Status:   Future     Standing Expiration Date:   9/21/2019     Order Specific Question:   Reason for Exam (Describe Symptoms):     Answer:   Thyroid Nodules.     Order Specific Question:   Is the patient pregnant?     Answer:   No     Order Specific Question:   Can the procedure be changed per Radiologist protocol?     Answer:   Yes     Thyroid [place]     JIC LAV     There are no discontinued medications.    No Follow-up on file.      CHIEF COMPLAINT:  Chief Complaint   Patient presents with     Follow-up     Respiratory Infection, not getting any better yet. hasn't been on antibiotics a full 48hrs yet.      Headache       HISTORY OF PRESENT ILLNESS:  Joan is a 42 y.o. female presenting to the clinic today for follow-up of emergency room visit which  was made about 2 days ago.  She had gone in because she was having some pain to the calf on the right side as well as sudden onset of dyspnea, increased heart rate, sweatiness as well as some shakiness.  At the emergency room she was evaluated for pulmonary embolism and did have a CTA as well as extremity ultrasound to rule out DVT.  But evaluation was negative for pulmonary embolism, but did note groundglass appearance of the centrilobular area of the lung also noted some nodularity to the thyroid.  Prior to going to the emergency room she is been seen on 2 different occasions for sinus infections last time she was treated with cefdinir earlier this month.  She does continue to have some cough especially once or twice during the day, notes some drainage from the sinuses and the oropharyngeal area.  She continues to have some headaches and feels that her ears are still full she is unable to hear out of it.  She denied any wheezing, but continues to have some dyspnea.  She has no chest pain.  She feels that she is not improved since stopping her antibiotics.  She is no longer having pain to the lower extremity right side but did have a posterior thigh pain that was sharp and the site.  Very quickly.  REVIEW OF SYSTEMS:   She does have a history of anxiety though denied anxiety at this point.  Also had a history of depression but not on treatment.  She notes sleeping well.  Denied having any nausea vomiting, no abdominal discomfort.  She does not have any rash.  She does not have any known swelling to lower extremities though had some pain on the right posterior thigh earlier this morning which also ceased.  Does not have any back pain or hip pain.  All other systems are negative.    PFSH:  Reviewed, as below.    History   Smoking Status     Current Every Day Smoker     Types: Cigarettes     Last attempt to quit: 02/2017   Smokeless Tobacco     Never Used     Comment: 1-2 cigarettes per day       Family History    Problem Relation Age of Onset     Breast cancer Mother 50     DCIS     Lung cancer Mother 55     Breast cancer Cousin 39     paternal first-cousin     Pulmonary embolism Father 60     Heart attack Paternal Grandfather 70     Melanoma Maternal Aunt      Cancer Paternal Aunt      late-onset, type unknown     Melanoma Maternal Grandfather      Cancer Other      great-uncle (maternal grandfather's brother), type unknown     Cancer Other      great-aunt (maternal grandfather's sister), type unknown       Social History     Social History     Marital status:      Spouse name: N/A     Number of children: N/A     Years of education: N/A     Occupational History     Not on file.     Social History Main Topics     Smoking status: Current Every Day Smoker     Types: Cigarettes     Last attempt to quit: 02/2017     Smokeless tobacco: Never Used      Comment: 1-2 cigarettes per day     Alcohol use No      Comment: Previous alcohol use. Sober now.     Drug use: No     Sexual activity: Not on file     Other Topics Concern     Not on file     Social History Narrative       Past Surgical History:   Procedure Laterality Date     BREAST BIOPSY Left 03/2012     [place] REMOVAL OF TONSILS,<12 Y/O      Description: Tonsillectomy;  Recorded: 07/29/2009;       Allergies   Allergen Reactions     Hydrocodone        Active Ambulatory Problems     Diagnosis Date Noted     Depression 04/19/2018     Anxiety 04/19/2018     ETOH abuse 04/19/2018     Acute pyelonephritis      Major depressive disorder      Resolved Ambulatory Problems     Diagnosis Date Noted     Nicotine Dependence      No Additional Past Medical History       Current Outpatient Prescriptions   Medication Sig Dispense Refill     disulfiram (ANTABUSE) 250 mg tablet TAKE 1 TABLET BY MOUTH EVERY DAY 90 tablet 0     doxycycline (VIBRAMYCIN) 100 MG capsule Take 1 capsule (100 mg total) by mouth 2 (two) times a day for 7 days. 14 capsule 0     gabapentin (NEURONTIN) 300 MG capsule  Take 1 capsule (300 mg total) by mouth 3 (three) times a day. 270 capsule 3     omeprazole (PRILOSEC) 20 MG capsule Take 1 capsule (20 mg total) by mouth daily. 30 capsule 1     traMADol (ULTRAM) 50 mg tablet Take 1 tablet (50 mg total) by mouth every 6 (six) hours as needed for pain. 8 tablet 0     albuterol (PROVENTIL) 2.5 mg /3 mL (0.083 %) nebulizer solution Take 3 mL (2.5 mg total) by nebulization every 6 (six) hours as needed for wheezing. 75 mL 12     fexofenadine (ALLEGRA) 180 MG tablet Take 180 mg by mouth daily as needed.       JUNEL 1/20, 21, 1-20 mg-mcg per tablet        predniSONE (DELTASONE) 20 MG tablet 60mg x 3 d, 40mg x 3d, 20mg x 3d 18 tablet 0     PROAIR HFA 90 mcg/actuation inhaler        SUMAtriptan (IMITREX) 50 MG tablet TAKE 1/2 TABLET BY MOUTH EVERY 2 HOURS AS NEEDED FOR MIGRAINE. MAX 200MG/24 HR 10 tablet 2     valACYclovir (VALTREX) 1000 MG tablet Take 2,000 mg by mouth 2 (two) times a day as needed. For one day only       No current facility-administered medications for this visit.        VITALS:  Vitals:    09/21/18 1107   BP: 102/60   Pulse: 75   Temp: 97.9  F (36.6  C)   TempSrc: Oral   SpO2: 98%   Weight: 143 lb 9.6 oz (65.1 kg)     Wt Readings from Last 3 Encounters:   09/21/18 143 lb 9.6 oz (65.1 kg)   09/19/18 140 lb (63.5 kg)   09/05/18 144 lb 1 oz (65.3 kg)     Body mass index is 24.65 kg/(m^2).    PHYSICAL EXAM:  General Appearance: Alert, cooperative, no distress, appears stated age.  HEENT: Pupils are equal and reactive, extraocular motions is normal.  Oropharynx is moist.  There is a mild postnasal drip.  Neck is supple no notable thyromegaly or tenderness..  External ears are normal.  She does have minimal effusion with slight congestion bilaterally.  There is tenderness to the sinuses especially the left maxillary sinus.  Lungs: Clear to auscultation bilaterally, respirations unlabored.  There is no wheeze.  Heart: Regular rate and rhythm, S1 and S2 normal.  Abdomen:  Soft  Musculoskeletal: Normal range of motion. No joint swelling or deformity.   Neurologic:  Alert and oriented times 3. Normal reflexes. Cranial nerves II-XII intact.   Psychiatric: Normal mood and affect.    MEDICATIONS:  Current Outpatient Prescriptions   Medication Sig Dispense Refill     disulfiram (ANTABUSE) 250 mg tablet TAKE 1 TABLET BY MOUTH EVERY DAY 90 tablet 0     doxycycline (VIBRAMYCIN) 100 MG capsule Take 1 capsule (100 mg total) by mouth 2 (two) times a day for 7 days. 14 capsule 0     gabapentin (NEURONTIN) 300 MG capsule Take 1 capsule (300 mg total) by mouth 3 (three) times a day. 270 capsule 3     omeprazole (PRILOSEC) 20 MG capsule Take 1 capsule (20 mg total) by mouth daily. 30 capsule 1     traMADol (ULTRAM) 50 mg tablet Take 1 tablet (50 mg total) by mouth every 6 (six) hours as needed for pain. 8 tablet 0     albuterol (PROVENTIL) 2.5 mg /3 mL (0.083 %) nebulizer solution Take 3 mL (2.5 mg total) by nebulization every 6 (six) hours as needed for wheezing. 75 mL 12     fexofenadine (ALLEGRA) 180 MG tablet Take 180 mg by mouth daily as needed.       JUNEL 1/20, 21, 1-20 mg-mcg per tablet        predniSONE (DELTASONE) 20 MG tablet 60mg x 3 d, 40mg x 3d, 20mg x 3d 18 tablet 0     PROAIR HFA 90 mcg/actuation inhaler        SUMAtriptan (IMITREX) 50 MG tablet TAKE 1/2 TABLET BY MOUTH EVERY 2 HOURS AS NEEDED FOR MIGRAINE. MAX 200MG/24 HR 10 tablet 2     valACYclovir (VALTREX) 1000 MG tablet Take 2,000 mg by mouth 2 (two) times a day as needed. For one day only       No current facility-administered medications for this visit.

## 2021-06-21 NOTE — PROGRESS NOTES
Preoperative Exam    Scheduled Procedure: thyroirectomy  Surgery Date:  10/29/18  Surgery Location: [facility] fax [phone]    Surgeon:  Dr. Munguia      Assessment/Plan:     1.  Preop general physical exam for Thyroid cancer (H)  -     Pregnancy (Beta-hCG, Qual), Urine  -     Hemoglobin    Surgical Procedure Risk: Low (reported cardiac risk generally < 1%)  Have you had prior anesthesia?: Yes  Have you or any family members had a previous anesthesia reaction:  No  Do you or any family members have a history of a clotting or bleeding disorder?: No  Cardiac Risk Assessment: no increased risk for major cardiac complications    Patient approved for surgery with general or local anesthesia.    Functional Status: Independent  Patient plans to recover at home with family.     2.  ETOH abuse, stopped March 2018  Has been taking antabuse and gabapentin    3.  Smokes cigarettes  Has stopped for several weeks        Subjective:      Joan Christianson is a 42 y.o. female who presents for a preoperative consultation.  thyroid cancer    All other systems reviewed and are negative, other than those listed in the HPI.    Pertinent History  Do you have difficulty breathing or chest pain after walking up a flight of stairs: Yes: short of breath  History of obstructive sleep apnea: No  Steroid use in the last 6 months: Yes: ear infection  Frequent Aspirin/NSAID use: No  Prior Blood Transfusion: No  Prior Blood Transfusion Reaction: No  If for some reason prior to, during or after the procedure, if it is medically indicated, would you be willing to have a blood transfusion?:  There is no transfusion refusal.    Current Outpatient Prescriptions   Medication Sig Dispense Refill     disulfiram (ANTABUSE) 250 mg tablet TAKE 1 TABLET BY MOUTH EVERY DAY 90 tablet 0     fexofenadine (ALLEGRA) 180 MG tablet Take 180 mg by mouth daily as needed.       gabapentin (NEURONTIN) 300 MG capsule Take 1 capsule (300 mg total) by mouth 3 (three)  "times a day. 270 capsule 3     JUNEL 1/20, 21, 1-20 mg-mcg per tablet        omeprazole (PRILOSEC) 20 MG capsule Take 1 capsule (20 mg total) by mouth daily. 30 capsule 1     PROAIR HFA 90 mcg/actuation inhaler        SUMAtriptan (IMITREX) 50 MG tablet TAKE 1/2 TABLET BY MOUTH EVERY 2 HOURS AS NEEDED FOR MIGRAINE. MAX 200MG/24 HR 10 tablet 2     traMADol (ULTRAM) 50 mg tablet Take 1 tablet (50 mg total) by mouth every 6 (six) hours as needed for pain. 8 tablet 0     valACYclovir (VALTREX) 1000 MG tablet Take 2,000 mg by mouth 2 (two) times a day as needed. For one day only       No current facility-administered medications for this visit.         Allergies   Allergen Reactions     Hydrocodone        Patient Active Problem List   Diagnosis     Depression     Anxiety     ETOH abuse, stopped March 2018     Acute pyelonephritis     Major depressive disorder     Thyroid cancer (H)     Smokes cigarettes       No past medical history on file.    Past Surgical History:   Procedure Laterality Date     BREAST BIOPSY Left 03/2012     [place] REMOVAL OF TONSILS,<13 Y/O      Description: Tonsillectomy;  Recorded: 07/29/2009;       Social History     Social History     Marital status:      Spouse name: N/A     Number of children: N/A     Years of education: N/A     Occupational History     Not on file.     Social History Main Topics     Smoking status: Current Every Day Smoker     Types: Cigarettes     Last attempt to quit: 02/2017     Smokeless tobacco: Never Used      Comment: 1-2 cigarettes per day     Alcohol use No      Comment: Previous alcohol use. Sober now.     Drug use: No     Sexual activity: Not on file     Other Topics Concern     Not on file     Social History Narrative       Patient Care Team:  Lan Cervantes MD as PCP - General          Objective:     Vitals:    10/22/18 1452   BP: 116/70   Pulse: 70   SpO2: 99%   Weight: 148 lb 8 oz (67.4 kg)   Height: 5' 5\" (1.651 m)   LMP: 08/22/2018 "         Physical Exam:    Objective:    Physical Exam   Vitals:    10/22/18 1452   BP: 116/70   Pulse: 70   SpO2: 99%      Constitutional: Patient is oriented to person, place, and time. Patient appears well-developed and well-nourished. No distress.   Head: Normocephalic and atraumatic.   Right Ear: External ear normal. Normal TM  Left Ear: External ear normal. Normal TM  Nose: Nose normal.   Mouth/Throat: Oropharynx is clear and moist. No oropharyngeal exudate.   Eyes: Conjunctivae and EOM are normal. Pupils are equal, round, and reactive to light. Right eye exhibits no discharge. Left eye exhibits no discharge. No scleral icterus.   Neck: Neck supple. No JVD present. No tracheal deviation present. No thyromegaly present.   Cardiovascular: Normal rate, regular rhythm, normal heart sounds and intact distal pulses. No murmur heard.   Pulmonary/Chest: Effort normal and breath sounds normal. No stridor. No respiratory distress. Patient has no wheezes, no rales, exhibits no tenderness.   Abdominal: Soft. Bowel sounds are normal. Patient exhibits no distension and no mass. There is no tenderness. There is no rebound and no guarding.   Lymphadenopathy:  Patient has no cervical adenopathy.   Neurological: Patient is alert and oriented to person, place, and time. Patient has normal reflexes. No cranial nerve deficit. Coordination normal.   Skin: Skin is warm and dry. No rash noted. Patient is not diaphoretic. No erythema. No pallor.      Results for orders placed or performed in visit on 10/22/18   Pregnancy (Beta-hCG, Qual), Urine   Result Value Ref Range    Pregnancy Test, Urine Negative Negative    Specific Gravity, UA 1.020 1.001 - 1.030   Hemoglobin   Result Value Ref Range    Hemoglobin 12.9 12.0 - 16.0 g/dL        Immunization History   Administered Date(s) Administered     Hep A, historic 07/29/2009, 04/06/2010     Influenza, Seasonal, Inj PF IIV3 09/16/2010     Influenza, seasonal,quad inj 36+ mos 09/21/2015,  09/07/2017     Pneumo Polysac 23-V 10/01/2006     Td,adult,historic,unspecified 10/01/2006, 04/06/2010     Tdap 04/06/2010           Electronically signed by Lan Cervantes MD 10/23/18 2:42 PM

## 2021-06-24 NOTE — TELEPHONE ENCOUNTER
RN cannot approve Refill Request    RN can NOT refill this medication med is not covered by policy/route to provider. Last office visit: 9/27/2018 Lan Pearson MD Last Physical: 10/22/2018 Last MTM visit: Visit date not found Last visit same specialty: 9/27/2018 Lan Pearson MD.  Next visit within 3 mo: Visit date not found  Next physical within 3 mo: Visit date not found      Lidia Rodriguez, [facility] Connection Triage/Med Refill 3/13/2019    Requested Prescriptions   Pending Prescriptions Disp Refills     disulfiram (ANTABUSE) 250 mg tablet 90 tablet 0     Sig: Take 1 tablet (250 mg total) by mouth daily.    There is no refill protocol information for this order

## 2021-06-24 NOTE — TELEPHONE ENCOUNTER
Refill Approved  10.22.18  Rx renewed per Medication Renewal Policy. Medication was last renewed on 10.22.18.    Ora Arias, [facility] Connection Triage/Med Refill 3/11/2019     Requested Prescriptions   Pending Prescriptions Disp Refills     SUMAtriptan (IMITREX) 50 MG tablet [Pharmacy Med Name: SUMATRIPTAN SUCC 50 MG TABLET] 10 tablet 2     Sig: TAKE 1/2 TABLET (25 MG TOTAL) BY MOUTH EVERY 2 HOURS AS NEEDED FOR MIGRAINE.    Triptans Refill Protocol Passed - 3/6/2019  1:22 AM       Passed - PCP or prescribing provider visit in past 12 months      Last office visit with prescriber/PCP: 9/27/2018 Lan Pearson MD OR same dept: 9/27/2018 Lan Pearson MD OR same specialty: 9/27/2018 Lan Pearson MD  Last physical: 10/22/2018 Last MTM visit: Visit date not found   Next visit within 3 mo: Visit date not found  Next physical within 3 mo: Visit date not found  Prescriber OR PCP: Lna Cervantes MD  Last diagnosis associated with med order: 1. Migraine  - SUMAtriptan (IMITREX) 50 MG tablet [Pharmacy Med Name: SUMATRIPTAN SUCC 50 MG TABLET]; TAKE 1/2 TABLET (25 MG TOTAL) BY MOUTH EVERY 2 HOURS AS NEEDED FOR MIGRAINE.  Dispense: 10 tablet; Refill: 2    If protocol passes may refill for 12 months if within 3 months of last provider visit (or a total of 15 months).

## 2021-06-24 NOTE — TELEPHONE ENCOUNTER
Medication Request  Medication name: Junel 1 mg - 20 mcg, one tablet daily #84  Pharmacy Name and Location: [address]  Reason for request: fax request received from pharmacy, medication is listed as historical  When did you use medication last?:  No fill date provided  Okay to leave a detailed message: yes

## 2021-07-03 NOTE — ADDENDUM NOTE
Addendum Note by Lan Hernandez MD at 4/27/2018  2:56 PM     Author: Lan Hernandez MD Service: -- Author Type: Physician    Filed: 4/27/2018  2:56 PM Encounter Date: 2/5/2018 Status: Signed    : Lan Hernandez MD (Physician)    Addended by: LAN HERNANDEZ on: 4/27/2018 02:56 PM        Modules accepted: Orders         Is This A New Presentation, Or A Follow-Up?: Skin Lesion What Type Of Note Output Would You Prefer (Optional)?: Bullet Format How Severe Is Your Skin Lesion?: mild Has Your Skin Lesion Been Treated?: not been treated

## 2021-07-03 NOTE — ADDENDUM NOTE
Addendum Note by Lan Hernandez MD at 2/19/2018  2:29 PM     Author: Lan Hernandez MD Service: -- Author Type: Physician    Filed: 2/19/2018  2:29 PM Encounter Date: 2/5/2018 Status: Signed    : Lan Hernandez MD (Physician)    Addended by: LAN HERNANDEZ on: 2/19/2018 02:29 PM        Modules accepted: Orders

## 2021-09-12 ENCOUNTER — HEALTH MAINTENANCE LETTER (OUTPATIENT)
Age: 46
End: 2021-09-12

## 2022-01-02 ENCOUNTER — HEALTH MAINTENANCE LETTER (OUTPATIENT)
Age: 47
End: 2022-01-02

## 2022-03-02 ENCOUNTER — TRANSFERRED RECORDS (OUTPATIENT)
Dept: HEALTH INFORMATION MANAGEMENT | Facility: CLINIC | Age: 47
End: 2022-03-02

## 2022-03-02 ENCOUNTER — MEDICAL CORRESPONDENCE (OUTPATIENT)
Dept: HEALTH INFORMATION MANAGEMENT | Facility: CLINIC | Age: 47
End: 2022-03-02

## 2022-03-02 LAB — TSH SERPL-ACNC: 0.01 UIU/ML (ref 0.35–4.94)

## 2022-06-16 ENCOUNTER — TELEPHONE (OUTPATIENT)
Dept: ENDOCRINOLOGY | Facility: CLINIC | Age: 47
End: 2022-06-16
Payer: COMMERCIAL

## 2022-06-16 NOTE — TELEPHONE ENCOUNTER
LM for pt to c/b to set up new pt appointment with Sharron Howard NP.     (appointment may need to be scheduled as a return to be placed on her schedule-as her practice is closed)     Ok'd per Mary.

## 2022-08-30 ENCOUNTER — OFFICE VISIT (OUTPATIENT)
Dept: ENDOCRINOLOGY | Facility: CLINIC | Age: 47
End: 2022-08-30

## 2022-08-30 ENCOUNTER — LAB (OUTPATIENT)
Dept: LAB | Facility: CLINIC | Age: 47
End: 2022-08-30
Payer: COMMERCIAL

## 2022-08-30 VITALS
BODY MASS INDEX: 25.11 KG/M2 | SYSTOLIC BLOOD PRESSURE: 146 MMHG | HEART RATE: 100 BPM | DIASTOLIC BLOOD PRESSURE: 88 MMHG | WEIGHT: 150.9 LBS

## 2022-08-30 DIAGNOSIS — E89.0 POSTOPERATIVE HYPOTHYROIDISM: Primary | ICD-10-CM

## 2022-08-30 DIAGNOSIS — E89.0 POSTOPERATIVE HYPOTHYROIDISM: ICD-10-CM

## 2022-08-30 PROBLEM — N72 HIGH RISK HUMAN PAPILLOMA VIRUS (HPV) INFECTION OF CERVIX: Status: ACTIVE | Noted: 2018-02-27

## 2022-08-30 PROBLEM — M25.559 ARTHRALGIA OF HIP: Status: ACTIVE | Noted: 2018-12-01

## 2022-08-30 PROBLEM — B97.7 HIGH RISK HUMAN PAPILLOMA VIRUS (HPV) INFECTION OF CERVIX: Status: ACTIVE | Noted: 2018-02-27

## 2022-08-30 PROBLEM — K63.5 POLYP OF COLON: Status: ACTIVE | Noted: 2022-04-21

## 2022-08-30 PROBLEM — C77.9 MALIGNANT NEOPLASM METASTATIC TO LYMPH NODES (H): Status: ACTIVE | Noted: 2022-02-10

## 2022-08-30 PROBLEM — D12.6 BENIGN NEOPLASM OF COLON: Status: ACTIVE | Noted: 2022-04-25

## 2022-08-30 PROBLEM — C73 PAPILLARY THYROID CARCINOMA (H): Status: ACTIVE | Noted: 2018-12-01

## 2022-08-30 LAB
T4 FREE SERPL-MCNC: 1.34 NG/DL (ref 0.9–1.7)
TSH SERPL DL<=0.005 MIU/L-ACNC: 0.06 UIU/ML (ref 0.3–4.2)

## 2022-08-30 PROCEDURE — 99203 OFFICE O/P NEW LOW 30 MIN: CPT | Performed by: NURSE PRACTITIONER

## 2022-08-30 PROCEDURE — 84443 ASSAY THYROID STIM HORMONE: CPT

## 2022-08-30 PROCEDURE — 36415 COLL VENOUS BLD VENIPUNCTURE: CPT

## 2022-08-30 PROCEDURE — 84439 ASSAY OF FREE THYROXINE: CPT

## 2022-08-30 RX ORDER — TACROLIMUS 1 MG/G
OINTMENT TOPICAL 2 TIMES DAILY PRN
COMMUNITY
Start: 2022-08-02 | End: 2023-04-24

## 2022-08-30 RX ORDER — LEVOTHYROXINE SODIUM 150 UG/1
150 TABLET ORAL DAILY
Qty: 90 TABLET | Refills: 3 | Status: SHIPPED | OUTPATIENT
Start: 2022-08-30 | End: 2023-08-07

## 2022-08-30 RX ORDER — CALCITRIOL 0.5 UG/1
0.5 CAPSULE, LIQUID FILLED ORAL AT BEDTIME
COMMUNITY
Start: 2021-05-09 | End: 2023-03-02

## 2022-08-30 RX ORDER — OLOPATADINE HYDROCHLORIDE 1 MG/ML
SOLUTION/ DROPS OPHTHALMIC
COMMUNITY
Start: 2022-08-08 | End: 2022-10-11

## 2022-08-30 RX ORDER — MONTELUKAST SODIUM 10 MG/1
10 TABLET ORAL DAILY
COMMUNITY
Start: 2022-06-22 | End: 2022-10-11

## 2022-08-30 RX ORDER — LANOLIN ALCOHOL/MO/W.PET/CERES
3 CREAM (GRAM) TOPICAL AT BEDTIME
COMMUNITY

## 2022-08-30 RX ORDER — LEVOTHYROXINE SODIUM 150 UG/1
150 TABLET ORAL EVERY 24 HOURS
COMMUNITY
Start: 2021-06-07 | End: 2022-08-30

## 2022-08-30 RX ORDER — AZELASTINE 1 MG/ML
2 SPRAY, METERED NASAL 2 TIMES DAILY
COMMUNITY
Start: 2022-06-22 | End: 2022-10-11

## 2022-08-30 NOTE — PROGRESS NOTES
"[facility] ENDOCRINOLOGY    Thyroid Note  8/30/2022    Joan Christianson, 1975, [id number]          Reason for visit      1. Postoperative hypothyroidism        HPI     Joan Christianson is a very pleasant 46 year old old female who presents for follow up.  SUMMARY:    Joan is here today to establish care for postoperative Hypothyroidism. She underwent a Thyroidectomy for Papillary Carcinoma in 1/2019 and had an Ablation with 50.3 mCI 1-131.  In 2/20, she was found via Ultrasound to have a 2.5 cm mass in the lower right resection bed that was suspicious for residual disease or recurrence. Bilateral neck lymph node were suspicious for rachna metastatic disease.  Ultrasound guided FNA was Positive for malignancy of papillary carcinoma. After these findings, pt went to [place] in 4/20 for bilateral lateral neck dissection and reoperative central neck dissection for the removal of these lymph glands (21 nodes were involved in metastatic papillary Thyroid Cancer. US done of Thyroid bed in 3/21 with impression: Post total thyroidectomy with no sonographic evidence of residual or recurrent tumor. She has been under the care of Declan Duckworth MD/Endocrinology up until March of 2022.      \"Our typical goal with thyroid cancer is the keep the TSH between 0.05-0.20 for the first five years after diagnosis, then between 0.1-0.4 for the next 5 years. We then target the TSH around the lower limit of normal, in the range of 0.3-1.3.\"     Joan reports today that she is feeling well. Her most recent TSH (3/22) was 0.01 and fT4 was 1.27.  She is taking 150 mcg of Synthroid daily with a sip of water and typically waits 1 hour prior to eating or drinking anything. She has had a good response to the Synthroid, and because of the Papillary Cancer dx, it is important for her to remain on the Brand name.  She is having no problems referable to her neck at present.       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    Social History      reports that she has been smoking cigarettes and cigarettes. She has never used smokeless tobacco. She reports that she does not drink alcohol and does not use drugs.      Review of Systems     Patient denies fatigue, weight changes, heat/cold intolerance, bowel/skin changes or CVS symptoms.   Remainder per HPI and per attached intake form.      Vital Signs     BP (!) 146/88 (BP Location: Right arm, Patient Position: Sitting, Cuff Size: Adult Regular)   Pulse 100   Wt 68.4 kg (150 lb 14.4 oz)   BMI 25.11 kg/m    Wt Readings from Last 3 Encounters:   08/30/22 68.4 kg (150 lb 14.4 oz)   10/22/18 67.4 kg (148 lb 8 oz)   09/27/18 66.8 kg (147 lb 5 oz)       Physical Exam     General:  Normal, NIRD,appears euthyroid  Eyes:  Pupils equal, round and reactive to light; no proptosis, lid lag or  periorbital edema.  Thyroid:  Thyroid is absent.  Neck: No lymph nodes palpable  Musculoskeletal:  Muscle strength grossly normal without evidence of wasting.  Heart:  Regular rate and rhythm without murmur.  Lungs:  Clear to auscultation.  Abdomen:  Soft, non-tender, no masses or organomegaly  Neuro: Patella Reflexes were hyper.No tremors  Skin:  No acanthosis nigricans or vitiligo          Assessment     1. Postoperative hypothyroidism            Plan       Will get another TSH and fT4 done today as it has been almost 6 months. I do not anticipate changing her Synthroid dose based upon her last set of labs. She will f/u with me in 6 months. We will plan on an annual Thyroid bed US given her hx.       Sharron Howard NP  [facility]  8/30/2022  2:27 PM        Lab Results     TSH   Date Value Ref Range Status   09/21/2018 1.47 0.30 - 5.00 uIU/mL Final     No components found for: THYROIDAB    No results found for: G2DWPHF    Imaging Results   Last thyroid ultrasound:  For Patients: As a result of the 21st Century Cures Act, medical imaging exams and procedure reports are released immediately into your electronic medical record. You may view this report before your referring provider. If you have questions, please contact your health care provider.       Procedure Note    So, Ceasar Kennedy MD - 03/18/2022   Formatting of this note might be different from the original.   For Patients: As a result of the 21st Century Cures Act, medical imaging exams and procedure reports are released immediately into your electronic medical record. You may view this report before your referring provider. If you have questions, please contact your health care provider.     EXAM: US THYROID/PARATHYROID   LOCATION: [facility]   DATE/TIME: 3/18/2022 9:01 AM     INDICATION: Papillary Thyroid Carcinoma (hc)   COMPARISON: 03/02/2021   TECHNIQUE: Thyroid ultrasound.     FINDINGS:   Thyroidectomy. No suspicious nodule or mass in the resection bed.     NECK: In the upper right lateral neck is a 1.1 x 0.3 x 0.4 cm lymph node without suspicious features. This previously measured 1.0 x 0.4 x 0.3 cm.     IMPRESSION:   1.  Thyroidectomy. No sonographic evidence of residual or  recurrent disease.      Last thyroid nuclear scan:  [facility] ONCOLOGY THYROID WHOLE BODY POST TREATMENT      Narrative    EXAM: NM ONCOLOGY THYROID WHOLE BODY POST TREATMENT   LOCATION: [facility]   DATE/TIME: 3/5/2020 8:26 AM     INDICATION: Papillary Thyroid Carcinoma (hc), follow-up. Recent FNA right lower   cervical lesions consistent with recurrent papillary thyroid cancer.   COMPARISON: Thyroid ultrasound 02/13/2020, FNA 02/21/2020, post treatment iodine   scan 01/17/2019 reviewed.   TECHNIQUE: 1.37 mCi I-123, oral ingestion. 24-hour neck uptake and whole body   planar imaging.     FINDINGS: 24-hour uptake is 0.2%. Normal physiologic radionuclide activity. No   focal uptake in the neck to correspond with the sites of biopsy-proven recurrent   papillary thyroid cancer in the right lower neck. No scintigraphic evidence of   residual, recurrent, or metastatic thyroid carcinoma.     IMPRESSION:   No scintigraphic evidence of residual, recurrent, or metastatic thyroid   carcinoma.    Other Result Text    Interface, Momocribe - 03/05/2020  9:48 AM CST   EXAM: NM ONCOLOGY THYROID WHOLE BODY POST TREATMENT   LOCATION: [facility]   DATE/TIME: 3/5/2020 8:26 AM     INDICATION: Papillary Thyroid Carcinoma (hc), follow-up. Recent FNA right lower   cervical lesions consistent with recurrent papillary thyroid cancer.   COMPARISON: Thyroid ultrasound 02/13/2020, FNA 02/21/2020, post treatment iodine   scan 01/17/2019 reviewed.   TECHNIQUE: 1.37 mCi I-123, oral ingestion. 24-hour neck uptake and whole body   planar imaging.     FINDINGS: 24-hour uptake is 0.2%. Normal physiologic radionuclide activity. No   focal uptake in the neck to correspond with the sites of biopsy-proven recurrent   papillary thyroid cancer in the right lower neck. No scintigraphic evidence of   residual, recurrent, or metastatic thyroid carcinoma.     IMPRESSION:   No scintigraphic evidence of residual, recurrent, or metastatic thyroid    carcinoma.      Current Medications     Outpatient Medications Prior to Visit   Medication Sig Dispense Refill     azelastine (ASTELIN) 0.1 % nasal spray Spray 2 sprays in nostril 2 times daily       calcitRIOL (ROCALTROL) 0.5 MCG capsule Take 0.5 mcg by mouth daily       fexofenadine (ALLEGRA) 180 MG tablet [FEXOFENADINE (ALLEGRA) 180 MG TABLET] Take 180 mg by mouth daily as needed.       gabapentin (NEURONTIN) 300 MG capsule [GABAPENTIN (NEURONTIN) 300 MG CAPSULE] TAKE 1 CAPSULE BY MOUTH THREE TIMES A DAY *NEEDS TO BE SEEN FOR FUTURE REFILLS* (Patient taking differently: Take 300 mg by mouth daily) 270 capsule 0     JUNEL 1/20, 21, 1-20 mg-mcg per tablet [JUNEL 1/20, 21, 1-20 MG-MCG PER TABLET] TAKE 1 TABLET BY MOUTH EVERY DAY 28 tablet 0     melatonin 3 MG tablet Take 3 mg by mouth At Bedtime       montelukast (SINGULAIR) 10 MG tablet Take 10 mg by mouth daily       olopatadine (PATANOL) 0.1 % ophthalmic solution INSTILL 1 DROP INTO BOTH EYES TWICE A DAY       omeprazole (PRILOSEC) 20 MG capsule [OMEPRAZOLE (PRILOSEC) 20 MG CAPSULE] TAKE 1 CAPSULE BY MOUTH EVERY DAY 90 capsule 3     SUMAtriptan (IMITREX) 50 MG tablet [SUMATRIPTAN (IMITREX) 50 MG TABLET] TAKE 1/2 TABLET (25 MG TOTAL) BY MOUTH EVERY 2 HOURS AS NEEDED FOR MIGRAINE. 10 tablet 2     tacrolimus (PROTOPIC) 0.1 % external ointment APPLY TOPICALLY TO AFFECTED AREA(S) 2 TIMES DAILY       valACYclovir (VALTREX) 1000 MG tablet [VALACYCLOVIR (VALTREX) 1000 MG TABLET] Take 2,000 mg by mouth 2 (two) times a day as needed. For one day only       vitamin B-12 (CYANOCOBALAMIN) 500 MCG tablet Take 500 mcg by mouth daily       disulfiram (ANTABUSE) 250 mg tablet [DISULFIRAM (ANTABUSE) 250 MG TABLET] Take 1 tablet (250 mg total) by mouth daily. 90 tablet 0     levothyroxine (SYNTHROID/LEVOTHROID) 150 MCG tablet Take 150 mcg by mouth every 24 hours       PROAIR HFA 90 mcg/actuation inhaler [PROAIR HFA 90 MCG/ACTUATION INHALER]        SUMAtriptan (IMITREX) 50 MG  tablet [SUMATRIPTAN (IMITREX) 50 MG TABLET] TAKE 1/2 TABLET BY MOUTH EVERY 2 HOURS AS NEEDED FOR MIGRAINE. MAX 200MG/24 HR 10 tablet 2     traMADol (ULTRAM) 50 mg tablet [TRAMADOL (ULTRAM) 50 MG TABLET] Take 1 tablet (50 mg total) by mouth every 8 (eight) hours as needed for pain. 30 tablet 0     traMADol (ULTRAM) 50 mg tablet [TRAMADOL (ULTRAM) 50 MG TABLET] Take 1 tablet (50 mg total) by mouth every 6 (six) hours as needed for pain. 8 tablet 0     traZODone (DESYREL) 50 MG tablet [TRAZODONE (DESYREL) 50 MG TABLET] TAKE 1 TABLET (50 MG TOTAL) BY MOUTH AT BEDTIME. (Patient not taking: Reported on 8/30/2022) 90 tablet 0     No facility-administered medications prior to visit.

## 2022-09-06 ENCOUNTER — OFFICE VISIT (OUTPATIENT)
Dept: FAMILY MEDICINE | Facility: CLINIC | Age: 47
End: 2022-09-06
Payer: COMMERCIAL

## 2022-09-06 VITALS
WEIGHT: 151 LBS | DIASTOLIC BLOOD PRESSURE: 121 MMHG | BODY MASS INDEX: 25.13 KG/M2 | HEART RATE: 104 BPM | OXYGEN SATURATION: 97 % | SYSTOLIC BLOOD PRESSURE: 168 MMHG

## 2022-09-06 DIAGNOSIS — F32.5 MAJOR DEPRESSIVE DISORDER WITH SINGLE EPISODE, IN FULL REMISSION (H): ICD-10-CM

## 2022-09-06 DIAGNOSIS — D12.6 BENIGN NEOPLASM OF COLON, UNSPECIFIED PART OF COLON: ICD-10-CM

## 2022-09-06 DIAGNOSIS — Z76.89 ENCOUNTER TO ESTABLISH CARE: Primary | ICD-10-CM

## 2022-09-06 DIAGNOSIS — F10.10 ETOH ABUSE: ICD-10-CM

## 2022-09-06 DIAGNOSIS — C77.0 MALIGNANT NEOPLASM METASTATIC TO LYMPH NODE OF NECK (H): ICD-10-CM

## 2022-09-06 DIAGNOSIS — Z85.850 HISTORY OF THYROID CANCER: ICD-10-CM

## 2022-09-06 DIAGNOSIS — Z87.891 FORMER SMOKER: ICD-10-CM

## 2022-09-06 DIAGNOSIS — K63.5 POLYP OF COLON, UNSPECIFIED PART OF COLON, UNSPECIFIED TYPE: ICD-10-CM

## 2022-09-06 PROBLEM — F32.A DEPRESSION: Status: RESOLVED | Noted: 2018-04-19 | Resolved: 2022-09-06

## 2022-09-06 PROBLEM — F41.9 ANXIETY: Status: RESOLVED | Noted: 2018-04-19 | Resolved: 2022-09-06

## 2022-09-06 PROCEDURE — 99213 OFFICE O/P EST LOW 20 MIN: CPT | Performed by: NURSE PRACTITIONER

## 2022-09-06 RX ORDER — AZELASTINE HYDROCHLORIDE 0.5 MG/ML
1 SOLUTION/ DROPS OPHTHALMIC 2 TIMES DAILY PRN
COMMUNITY
Start: 2022-08-25 | End: 2023-12-20

## 2022-09-06 ASSESSMENT — PATIENT HEALTH QUESTIONNAIRE - PHQ9
10. IF YOU CHECKED OFF ANY PROBLEMS, HOW DIFFICULT HAVE THESE PROBLEMS MADE IT FOR YOU TO DO YOUR WORK, TAKE CARE OF THINGS AT HOME, OR GET ALONG WITH OTHER PEOPLE: NOT DIFFICULT AT ALL
SUM OF ALL RESPONSES TO PHQ QUESTIONS 1-9: 1
SUM OF ALL RESPONSES TO PHQ QUESTIONS 1-9: 1

## 2022-09-06 NOTE — PROGRESS NOTES
Assessment & Plan     Encounter to establish care      Benign neoplasm of colon, unspecified part of colon      History of thyroid cancer      Malignant neoplasm metastatic to lymph node of neck (H)      Polyp of colon, unspecified part of colon, unspecified type      ETOH abuse, stopped March 2018      Major depressive disorder with single episode, in full remission (H)      Former smoker    -I reviewed care everywhere notes in the chart.  I also reviewed her previous endocrinology note with patient today.  I reviewed her last labs from endocrinology with patient today.  Her labs for her thyroid are stable and within the range that is needed related to her previous history.   -Her blood pressure is elevated today related to possibly Sudafed that she is taking related to nasal congestion in the morning.  I discussed the side effect of this medication and she has plans to stop the medication.  I discussed that her goal range her blood pressure is below 130/85.  If her blood pressure maintains normalcy she can restart the Singulair.  She will continue to check her blood pressure while on this medication as well.  If her blood pressure remains elevated she will return to the clinic to get reevaluated.   -Her Pap was normal in 2021.   -She will get a colonoscopy done in the next 5 years.        Return in about 1 year (around 9/6/2023), or if symptoms worsen or fail to improve, for Follow up, with me.    JYOTHI Feliciano [employer]    Jim Franco is a 46 year old, presenting for the following health issues:  Consult (Establish care and Rerral)    - present to establish care. Saw Endocrinology recently and labs appear stable. Was seen at [facility]. HO family Breast cancer, gets Mammograms yearly. Colonoscopy up to date, repeat in five years. She works out 6 days a week, eats healthy. Has a 17 year old and 14 year old son.   - Has been taking a decongestant at home, took  one today. Has a BP cuff at home. Stopped Singulair over one week ago since BP was elevated at Endo office.     Patient is present today to establish care.  She used to go to [facility] clinic and would like to continue on with her care at [facility].  She recently saw endocrinology and gets thyroid labs and ultrasounds done at least every 6 to 12 months.  She had a history of thyroid cancer with metastasis to the lymph nodes.  She stated that prior to her diagnosis she experienced irregular periods, depression, heart palpitations, and anxiety.  Since she had her thyroid removed her depression and anxiety has decreased.      History of Present Illness       Reason for visit:  Establish primary doctor    She eats 4 or more servings of fruits and vegetables daily.She consumes 0 sweetened beverage(s) daily.She exercises with enough effort to increase her heart rate 60 or more minutes per day.  She exercises with enough effort to increase her heart rate 6 days per week.   She is taking medications regularly.    Today's PHQ-9         PHQ-9 Total Score: 1    PHQ-9 Q9 Thoughts of better off dead/self-harm past 2 weeks :   Not at all    How difficult have these problems made it for you to do your work, take care of things at home, or get along with other people: Not difficult at all       Review of Systems   Constitutional, HEENT, cardiovascular, pulmonary, gi and gu systems are negative, except as otherwise noted.      Objective    BP (!) 168/121   Pulse 104   Wt 68.5 kg (151 lb)   SpO2 97%   BMI 25.13 kg/m    Body mass index is 25.13 kg/m .     Physical Exam   GENERAL: healthy, alert and no distress  MS: no gross musculoskeletal defects noted, no edema  PSYCH: mentation appears normal, affect normal/bright    Lab on 08/30/2022   Component Date Value Ref Range Status     TSH 08/30/2022 0.06 (A) 0.30 - 4.20 uIU/mL Final     Free T4 08/30/2022 1.34  0.90 - 1.70 ng/dL Final               [unfilled]  [unfilled]

## 2022-10-11 ENCOUNTER — HOSPITAL ENCOUNTER (INPATIENT)
Facility: CLINIC | Age: 47
LOS: 2 days | Discharge: HOME | DRG: 897 | End: 2022-10-13
Attending: EMERGENCY MEDICINE | Admitting: INTERNAL MEDICINE
Payer: COMMERCIAL

## 2022-10-11 ENCOUNTER — APPOINTMENT (OUTPATIENT)
Dept: RADIOLOGY | Facility: CLINIC | Age: 47
DRG: 897 | End: 2022-10-11
Attending: EMERGENCY MEDICINE
Payer: COMMERCIAL

## 2022-10-11 DIAGNOSIS — F10.230 ALCOHOL DEPENDENCE WITH UNCOMPLICATED WITHDRAWAL (H): ICD-10-CM

## 2022-10-11 DIAGNOSIS — E83.51 HYPOCALCEMIA: ICD-10-CM

## 2022-10-11 DIAGNOSIS — R05.1 ACUTE COUGH: ICD-10-CM

## 2022-10-11 DIAGNOSIS — E87.29 ALCOHOLIC KETOACIDOSIS: ICD-10-CM

## 2022-10-11 DIAGNOSIS — E87.20 METABOLIC ACIDOSIS: ICD-10-CM

## 2022-10-11 DIAGNOSIS — I10 PRIMARY HYPERTENSION: Primary | ICD-10-CM

## 2022-10-11 DIAGNOSIS — E83.42 HYPOMAGNESEMIA: ICD-10-CM

## 2022-10-11 PROBLEM — E88.89 KETOSIS (H): Status: ACTIVE | Noted: 2022-10-11

## 2022-10-11 LAB
ALBUMIN SERPL-MCNC: 3.9 G/DL (ref 3.5–5)
ALBUMIN UR-MCNC: NEGATIVE MG/DL
ALP SERPL-CCNC: 49 U/L (ref 45–120)
ALT SERPL W P-5'-P-CCNC: 47 U/L (ref 0–45)
ANION GAP SERPL CALCULATED.3IONS-SCNC: 24 MMOL/L (ref 5–18)
APPEARANCE UR: CLEAR
AST SERPL W P-5'-P-CCNC: 94 U/L (ref 0–40)
ATRIAL RATE - MUSE: 110 BPM
BACTERIA #/AREA URNS HPF: ABNORMAL /HPF
BASE EXCESS BLDA CALC-SCNC: -4.7 MMOL/L
BASE EXCESS BLDV CALC-SCNC: -7.5 MMOL/L
BASOPHILS # BLD AUTO: 0 10E3/UL (ref 0–0.2)
BASOPHILS NFR BLD AUTO: 1 %
BILIRUB DIRECT SERPL-MCNC: 0.2 MG/DL
BILIRUB SERPL-MCNC: 0.5 MG/DL (ref 0–1)
BILIRUB UR QL STRIP: NEGATIVE
BUN SERPL-MCNC: 4 MG/DL (ref 8–22)
CALCIUM SERPL-MCNC: 7.3 MG/DL (ref 8.5–10.5)
CHLORIDE BLD-SCNC: 95 MMOL/L (ref 98–107)
CO2 SERPL-SCNC: 17 MMOL/L (ref 22–31)
COHGB MFR BLD: 96.8 % (ref 96–97)
COLOR UR AUTO: COLORLESS
CREAT SERPL-MCNC: 0.77 MG/DL (ref 0.6–1.1)
DIASTOLIC BLOOD PRESSURE - MUSE: NORMAL MMHG
EOSINOPHIL # BLD AUTO: 0 10E3/UL (ref 0–0.7)
EOSINOPHIL NFR BLD AUTO: 0 %
ERYTHROCYTE [DISTWIDTH] IN BLOOD BY AUTOMATED COUNT: 12.3 % (ref 10–15)
ETHANOL SERPL-MCNC: 225 MG/DL
FLUAV AG SPEC QL IA: NEGATIVE
FLUAV RNA SPEC QL NAA+PROBE: NEGATIVE
FLUBV AG SPEC QL IA: NEGATIVE
FLUBV RNA RESP QL NAA+PROBE: NEGATIVE
GFR SERPL CREATININE-BSD FRML MDRD: >90 ML/MIN/1.73M2
GLUCOSE BLD-MCNC: 98 MG/DL (ref 70–125)
GLUCOSE UR STRIP-MCNC: NEGATIVE MG/DL
HCO3 BLD-SCNC: 21 MMOL/L (ref 23–29)
HCO3 BLDV-SCNC: 19 MMOL/L (ref 24–30)
HCT VFR BLD AUTO: 43.7 % (ref 35–47)
HGB BLD-MCNC: 14.7 G/DL (ref 11.7–15.7)
HGB UR QL STRIP: ABNORMAL
IMM GRANULOCYTES # BLD: 0 10E3/UL
IMM GRANULOCYTES NFR BLD: 0 %
INR PPP: 1.09 (ref 0.85–1.15)
INTERPRETATION ECG - MUSE: NORMAL
KETONES BLD-SCNC: 1.76 MMOL/L
KETONES UR STRIP-MCNC: 40 MG/DL
LACTATE SERPL-SCNC: 3.1 MMOL/L (ref 0.7–2)
LACTATE SERPL-SCNC: 5.3 MMOL/L (ref 0.7–2)
LACTATE SERPL-SCNC: 6.3 MMOL/L (ref 0.7–2)
LACTATE SERPL-SCNC: 7.6 MMOL/L (ref 0.7–2)
LEUKOCYTE ESTERASE UR QL STRIP: NEGATIVE
LIPASE SERPL-CCNC: 34 U/L (ref 0–52)
LYMPHOCYTES # BLD AUTO: 1.2 10E3/UL (ref 0.8–5.3)
LYMPHOCYTES NFR BLD AUTO: 22 %
MAGNESIUM SERPL-MCNC: 1.7 MG/DL (ref 1.8–2.6)
MAGNESIUM SERPL-MCNC: 1.8 MG/DL (ref 1.8–2.6)
MCH RBC QN AUTO: 33.8 PG (ref 26.5–33)
MCHC RBC AUTO-ENTMCNC: 33.6 G/DL (ref 31.5–36.5)
MCV RBC AUTO: 101 FL (ref 78–100)
MONOCYTES # BLD AUTO: 0.4 10E3/UL (ref 0–1.3)
MONOCYTES NFR BLD AUTO: 8 %
NEUTROPHILS # BLD AUTO: 3.8 10E3/UL (ref 1.6–8.3)
NEUTROPHILS NFR BLD AUTO: 69 %
NITRATE UR QL: NEGATIVE
NRBC # BLD AUTO: 0 10E3/UL
NRBC BLD AUTO-RTO: 0 /100
O2/TOTAL GAS SETTING VFR VENT: ABNORMAL %
OXYHGB MFR BLD: 95.5 % (ref 96–97)
OXYHGB MFR BLDV: 90.7 % (ref 70–75)
P AXIS - MUSE: 65 DEGREES
PCO2 BLD: 31 MM HG (ref 35–45)
PCO2 BLDV: 34 MM HG (ref 35–50)
PH BLD: 7.41 [PH] (ref 7.37–7.44)
PH BLDV: 7.34 [PH] (ref 7.35–7.45)
PH UR STRIP: 6 [PH] (ref 5–7)
PHOSPHATE SERPL-MCNC: 2.8 MG/DL (ref 2.5–4.5)
PHOSPHATE SERPL-MCNC: 3.3 MG/DL (ref 2.5–4.5)
PLATELET # BLD AUTO: 301 10E3/UL (ref 150–450)
PO2 BLD: 92 MM HG (ref 80–90)
PO2 BLDV: 73 MM HG (ref 25–47)
POTASSIUM BLD-SCNC: 3.3 MMOL/L (ref 3.5–5)
POTASSIUM BLD-SCNC: 3.5 MMOL/L (ref 3.5–5)
PR INTERVAL - MUSE: 118 MS
PROT SERPL-MCNC: 7.7 G/DL (ref 6–8)
QRS DURATION - MUSE: 80 MS
QT - MUSE: 378 MS
QTC - MUSE: 511 MS
R AXIS - MUSE: 66 DEGREES
RBC # BLD AUTO: 4.35 10E6/UL (ref 3.8–5.2)
RBC URINE: 2 /HPF
RSV RNA SPEC NAA+PROBE: NEGATIVE
SAO2 % BLDV: 92.6 % (ref 70–75)
SARS-COV-2 RNA RESP QL NAA+PROBE: NEGATIVE
SODIUM SERPL-SCNC: 136 MMOL/L (ref 136–145)
SP GR UR STRIP: 1.01 (ref 1–1.03)
SQUAMOUS EPITHELIAL: <1 /HPF
SYSTOLIC BLOOD PRESSURE - MUSE: NORMAL MMHG
T AXIS - MUSE: 63 DEGREES
T4 FREE SERPL-MCNC: 1.53 NG/DL (ref 0.9–1.7)
TEMPERATURE: 37 DEGREES C
TROPONIN I SERPL-MCNC: 0.02 NG/ML (ref 0–0.29)
TSH SERPL DL<=0.005 MIU/L-ACNC: 0.06 UIU/ML (ref 0.3–5)
UROBILINOGEN UR STRIP-MCNC: <2 MG/DL
VENTRICULAR RATE- MUSE: 110 BPM
WBC # BLD AUTO: 5.6 10E3/UL (ref 4–11)
WBC URINE: 1 /HPF

## 2022-10-11 PROCEDURE — 250N000013 HC RX MED GY IP 250 OP 250 PS 637: Performed by: EMERGENCY MEDICINE

## 2022-10-11 PROCEDURE — 84100 ASSAY OF PHOSPHORUS: CPT | Performed by: EMERGENCY MEDICINE

## 2022-10-11 PROCEDURE — 82805 BLOOD GASES W/O2 SATURATION: CPT | Performed by: EMERGENCY MEDICINE

## 2022-10-11 PROCEDURE — 36415 COLL VENOUS BLD VENIPUNCTURE: CPT | Performed by: INTERNAL MEDICINE

## 2022-10-11 PROCEDURE — 85025 COMPLETE CBC W/AUTO DIFF WBC: CPT | Performed by: PHYSICIAN ASSISTANT

## 2022-10-11 PROCEDURE — 84100 ASSAY OF PHOSPHORUS: CPT | Performed by: INTERNAL MEDICINE

## 2022-10-11 PROCEDURE — 83735 ASSAY OF MAGNESIUM: CPT | Performed by: INTERNAL MEDICINE

## 2022-10-11 PROCEDURE — 84484 ASSAY OF TROPONIN QUANT: CPT | Performed by: PHYSICIAN ASSISTANT

## 2022-10-11 PROCEDURE — 99285 EMERGENCY DEPT VISIT HI MDM: CPT | Mod: 25

## 2022-10-11 PROCEDURE — 258N000003 HC RX IP 258 OP 636: Performed by: INTERNAL MEDICINE

## 2022-10-11 PROCEDURE — 96361 HYDRATE IV INFUSION ADD-ON: CPT

## 2022-10-11 PROCEDURE — 258N000003 HC RX IP 258 OP 636: Performed by: PHYSICIAN ASSISTANT

## 2022-10-11 PROCEDURE — 250N000013 HC RX MED GY IP 250 OP 250 PS 637: Performed by: INTERNAL MEDICINE

## 2022-10-11 PROCEDURE — HZ2ZZZZ DETOXIFICATION SERVICES FOR SUBSTANCE ABUSE TREATMENT: ICD-10-PCS | Performed by: INTERNAL MEDICINE

## 2022-10-11 PROCEDURE — 258N000001 HC RX 258: Performed by: EMERGENCY MEDICINE

## 2022-10-11 PROCEDURE — 250N000009 HC RX 250: Performed by: INTERNAL MEDICINE

## 2022-10-11 PROCEDURE — 82805 BLOOD GASES W/O2 SATURATION: CPT | Performed by: INTERNAL MEDICINE

## 2022-10-11 PROCEDURE — 96365 THER/PROPH/DIAG IV INF INIT: CPT | Mod: 59

## 2022-10-11 PROCEDURE — 83605 ASSAY OF LACTIC ACID: CPT | Performed by: EMERGENCY MEDICINE

## 2022-10-11 PROCEDURE — 83605 ASSAY OF LACTIC ACID: CPT | Performed by: INTERNAL MEDICINE

## 2022-10-11 PROCEDURE — 96366 THER/PROPH/DIAG IV INF ADDON: CPT

## 2022-10-11 PROCEDURE — 93005 ELECTROCARDIOGRAM TRACING: CPT | Performed by: EMERGENCY MEDICINE

## 2022-10-11 PROCEDURE — 83690 ASSAY OF LIPASE: CPT | Performed by: EMERGENCY MEDICINE

## 2022-10-11 PROCEDURE — 99223 1ST HOSP IP/OBS HIGH 75: CPT | Performed by: INTERNAL MEDICINE

## 2022-10-11 PROCEDURE — 87040 BLOOD CULTURE FOR BACTERIA: CPT | Performed by: EMERGENCY MEDICINE

## 2022-10-11 PROCEDURE — 96367 TX/PROPH/DG ADDL SEQ IV INF: CPT

## 2022-10-11 PROCEDURE — 36415 COLL VENOUS BLD VENIPUNCTURE: CPT | Performed by: PHYSICIAN ASSISTANT

## 2022-10-11 PROCEDURE — 71046 X-RAY EXAM CHEST 2 VIEWS: CPT

## 2022-10-11 PROCEDURE — 258N000003 HC RX IP 258 OP 636: Performed by: EMERGENCY MEDICINE

## 2022-10-11 PROCEDURE — 96375 TX/PRO/DX INJ NEW DRUG ADDON: CPT

## 2022-10-11 PROCEDURE — 36415 COLL VENOUS BLD VENIPUNCTURE: CPT | Performed by: EMERGENCY MEDICINE

## 2022-10-11 PROCEDURE — 81001 URINALYSIS AUTO W/SCOPE: CPT | Performed by: EMERGENCY MEDICINE

## 2022-10-11 PROCEDURE — 80053 COMPREHEN METABOLIC PANEL: CPT | Performed by: PHYSICIAN ASSISTANT

## 2022-10-11 PROCEDURE — 96368 THER/DIAG CONCURRENT INF: CPT

## 2022-10-11 PROCEDURE — 82248 BILIRUBIN DIRECT: CPT | Performed by: EMERGENCY MEDICINE

## 2022-10-11 PROCEDURE — 87804 INFLUENZA ASSAY W/OPTIC: CPT | Performed by: INTERNAL MEDICINE

## 2022-10-11 PROCEDURE — C9803 HOPD COVID-19 SPEC COLLECT: HCPCS

## 2022-10-11 PROCEDURE — 120N000001 HC R&B MED SURG/OB

## 2022-10-11 PROCEDURE — 250N000011 HC RX IP 250 OP 636: Performed by: INTERNAL MEDICINE

## 2022-10-11 PROCEDURE — 82077 ASSAY SPEC XCP UR&BREATH IA: CPT | Performed by: EMERGENCY MEDICINE

## 2022-10-11 PROCEDURE — 36600 WITHDRAWAL OF ARTERIAL BLOOD: CPT

## 2022-10-11 PROCEDURE — 94640 AIRWAY INHALATION TREATMENT: CPT

## 2022-10-11 PROCEDURE — 250N000011 HC RX IP 250 OP 636: Performed by: EMERGENCY MEDICINE

## 2022-10-11 PROCEDURE — 84443 ASSAY THYROID STIM HORMONE: CPT | Performed by: EMERGENCY MEDICINE

## 2022-10-11 PROCEDURE — 85610 PROTHROMBIN TIME: CPT | Performed by: INTERNAL MEDICINE

## 2022-10-11 PROCEDURE — 84132 ASSAY OF SERUM POTASSIUM: CPT | Performed by: INTERNAL MEDICINE

## 2022-10-11 PROCEDURE — 83735 ASSAY OF MAGNESIUM: CPT | Performed by: EMERGENCY MEDICINE

## 2022-10-11 PROCEDURE — 250N000009 HC RX 250: Performed by: PHYSICIAN ASSISTANT

## 2022-10-11 PROCEDURE — 82010 KETONE BODYS QUAN: CPT | Performed by: EMERGENCY MEDICINE

## 2022-10-11 PROCEDURE — 84439 ASSAY OF FREE THYROXINE: CPT | Performed by: EMERGENCY MEDICINE

## 2022-10-11 PROCEDURE — 87637 SARSCOV2&INF A&B&RSV AMP PRB: CPT | Performed by: EMERGENCY MEDICINE

## 2022-10-11 RX ORDER — DIAZEPAM 10 MG/2ML
5-10 INJECTION, SOLUTION INTRAMUSCULAR; INTRAVENOUS EVERY 30 MIN PRN
Status: DISCONTINUED | OUTPATIENT
Start: 2022-10-11 | End: 2022-10-13 | Stop reason: HOSPADM

## 2022-10-11 RX ORDER — MULTIPLE VITAMINS W/ MINERALS TAB 9MG-400MCG
1 TAB ORAL DAILY
Status: DISCONTINUED | OUTPATIENT
Start: 2022-10-12 | End: 2022-10-13 | Stop reason: HOSPADM

## 2022-10-11 RX ORDER — DEXTROMETHORPHAN POLISTIREX 30 MG/5ML
30 SUSPENSION ORAL ONCE
Status: COMPLETED | OUTPATIENT
Start: 2022-10-11 | End: 2022-10-11

## 2022-10-11 RX ORDER — CLONIDINE HYDROCHLORIDE 0.1 MG/1
0.1 TABLET ORAL EVERY 8 HOURS
Status: DISCONTINUED | OUTPATIENT
Start: 2022-10-11 | End: 2022-10-13 | Stop reason: HOSPADM

## 2022-10-11 RX ORDER — LIDOCAINE 40 MG/G
CREAM TOPICAL
Status: DISCONTINUED | OUTPATIENT
Start: 2022-10-11 | End: 2022-10-13 | Stop reason: HOSPADM

## 2022-10-11 RX ORDER — GABAPENTIN 300 MG/1
900 CAPSULE ORAL EVERY 8 HOURS
Status: DISCONTINUED | OUTPATIENT
Start: 2022-10-12 | End: 2022-10-13 | Stop reason: HOSPADM

## 2022-10-11 RX ORDER — DIAZEPAM 10 MG
10 TABLET ORAL EVERY 30 MIN PRN
Status: DISCONTINUED | OUTPATIENT
Start: 2022-10-11 | End: 2022-10-13 | Stop reason: HOSPADM

## 2022-10-11 RX ORDER — PIPERACILLIN SODIUM, TAZOBACTAM SODIUM 3; .375 G/15ML; G/15ML
3.38 INJECTION, POWDER, LYOPHILIZED, FOR SOLUTION INTRAVENOUS EVERY 8 HOURS
Status: DISCONTINUED | OUTPATIENT
Start: 2022-10-11 | End: 2022-10-12

## 2022-10-11 RX ORDER — BENZONATATE 100 MG/1
100 CAPSULE ORAL 3 TIMES DAILY PRN
COMMUNITY
End: 2023-04-24

## 2022-10-11 RX ORDER — POLYETHYLENE GLYCOL 3350 17 G/17G
17 POWDER, FOR SOLUTION ORAL DAILY
Status: DISCONTINUED | OUTPATIENT
Start: 2022-10-11 | End: 2022-10-13 | Stop reason: HOSPADM

## 2022-10-11 RX ORDER — FOLIC ACID 5 MG/ML
1 INJECTION, SOLUTION INTRAMUSCULAR; INTRAVENOUS; SUBCUTANEOUS ONCE
Status: COMPLETED | OUTPATIENT
Start: 2022-10-11 | End: 2022-10-11

## 2022-10-11 RX ORDER — AMLODIPINE BESYLATE 5 MG/1
5 TABLET ORAL ONCE
Status: COMPLETED | OUTPATIENT
Start: 2022-10-11 | End: 2022-10-11

## 2022-10-11 RX ORDER — GABAPENTIN 300 MG/1
300 CAPSULE ORAL DAILY
COMMUNITY
End: 2022-10-26

## 2022-10-11 RX ORDER — SODIUM CHLORIDE, SODIUM LACTATE, POTASSIUM CHLORIDE, CALCIUM CHLORIDE 600; 310; 30; 20 MG/100ML; MG/100ML; MG/100ML; MG/100ML
INJECTION, SOLUTION INTRAVENOUS CONTINUOUS
Status: DISCONTINUED | OUTPATIENT
Start: 2022-10-11 | End: 2022-10-13

## 2022-10-11 RX ORDER — THIAMINE HYDROCHLORIDE 100 MG/ML
100 INJECTION, SOLUTION INTRAMUSCULAR; INTRAVENOUS ONCE
Status: COMPLETED | OUTPATIENT
Start: 2022-10-11 | End: 2022-10-11

## 2022-10-11 RX ORDER — GABAPENTIN 100 MG/1
100 CAPSULE ORAL EVERY 8 HOURS
Status: DISCONTINUED | OUTPATIENT
Start: 2022-10-19 | End: 2022-10-13 | Stop reason: HOSPADM

## 2022-10-11 RX ORDER — HALOPERIDOL 5 MG/ML
2.5-5 INJECTION INTRAMUSCULAR EVERY 6 HOURS PRN
Status: DISCONTINUED | OUTPATIENT
Start: 2022-10-11 | End: 2022-10-13 | Stop reason: HOSPADM

## 2022-10-11 RX ORDER — FLUMAZENIL 0.1 MG/ML
0.2 INJECTION, SOLUTION INTRAVENOUS
Status: DISCONTINUED | OUTPATIENT
Start: 2022-10-11 | End: 2022-10-13 | Stop reason: HOSPADM

## 2022-10-11 RX ORDER — MAGNESIUM SULFATE HEPTAHYDRATE 40 MG/ML
2 INJECTION, SOLUTION INTRAVENOUS ONCE
Status: COMPLETED | OUTPATIENT
Start: 2022-10-11 | End: 2022-10-11

## 2022-10-11 RX ORDER — ACETAMINOPHEN 325 MG/1
650 TABLET ORAL EVERY 6 HOURS PRN
Status: DISCONTINUED | OUTPATIENT
Start: 2022-10-11 | End: 2022-10-13 | Stop reason: HOSPADM

## 2022-10-11 RX ORDER — ONDANSETRON 4 MG/1
4 TABLET, ORALLY DISINTEGRATING ORAL EVERY 6 HOURS PRN
Status: DISCONTINUED | OUTPATIENT
Start: 2022-10-11 | End: 2022-10-13 | Stop reason: HOSPADM

## 2022-10-11 RX ORDER — TRIAMCINOLONE ACETONIDE 1 MG/G
OINTMENT TOPICAL 2 TIMES DAILY PRN
COMMUNITY

## 2022-10-11 RX ORDER — ONDANSETRON 2 MG/ML
4 INJECTION INTRAMUSCULAR; INTRAVENOUS EVERY 6 HOURS PRN
Status: DISCONTINUED | OUTPATIENT
Start: 2022-10-11 | End: 2022-10-13 | Stop reason: HOSPADM

## 2022-10-11 RX ORDER — GABAPENTIN 300 MG/1
300 CAPSULE ORAL EVERY 8 HOURS
Status: DISCONTINUED | OUTPATIENT
Start: 2022-10-17 | End: 2022-10-13 | Stop reason: HOSPADM

## 2022-10-11 RX ORDER — LORAZEPAM 2 MG/ML
1 INJECTION INTRAMUSCULAR ONCE
Status: COMPLETED | OUTPATIENT
Start: 2022-10-11 | End: 2022-10-11

## 2022-10-11 RX ORDER — PIPERACILLIN SODIUM, TAZOBACTAM SODIUM 3; .375 G/15ML; G/15ML
3.38 INJECTION, POWDER, LYOPHILIZED, FOR SOLUTION INTRAVENOUS ONCE
Status: COMPLETED | OUTPATIENT
Start: 2022-10-11 | End: 2022-10-11

## 2022-10-11 RX ORDER — GABAPENTIN 600 MG/1
1200 TABLET ORAL ONCE
Status: COMPLETED | OUTPATIENT
Start: 2022-10-11 | End: 2022-10-11

## 2022-10-11 RX ORDER — ONDANSETRON 2 MG/ML
4 INJECTION INTRAMUSCULAR; INTRAVENOUS ONCE
Status: DISCONTINUED | OUTPATIENT
Start: 2022-10-11 | End: 2022-10-11

## 2022-10-11 RX ORDER — ALBUTEROL SULFATE 90 UG/1
2 AEROSOL, METERED RESPIRATORY (INHALATION) EVERY 4 HOURS PRN
COMMUNITY
End: 2024-04-23

## 2022-10-11 RX ORDER — ACETAMINOPHEN 650 MG/1
650 SUPPOSITORY RECTAL EVERY 6 HOURS PRN
Status: DISCONTINUED | OUTPATIENT
Start: 2022-10-11 | End: 2022-10-13 | Stop reason: HOSPADM

## 2022-10-11 RX ORDER — GABAPENTIN 300 MG/1
600 CAPSULE ORAL EVERY 8 HOURS
Status: DISCONTINUED | OUTPATIENT
Start: 2022-10-15 | End: 2022-10-13 | Stop reason: HOSPADM

## 2022-10-11 RX ORDER — IPRATROPIUM BROMIDE AND ALBUTEROL SULFATE 2.5; .5 MG/3ML; MG/3ML
3 SOLUTION RESPIRATORY (INHALATION) ONCE
Status: COMPLETED | OUTPATIENT
Start: 2022-10-11 | End: 2022-10-11

## 2022-10-11 RX ORDER — DEXTROSE MONOHYDRATE 100 MG/ML
INJECTION, SOLUTION INTRAVENOUS ONCE
Status: COMPLETED | OUTPATIENT
Start: 2022-10-11 | End: 2022-10-11

## 2022-10-11 RX ORDER — FOLIC ACID 1 MG/1
1 TABLET ORAL DAILY
Status: DISCONTINUED | OUTPATIENT
Start: 2022-10-12 | End: 2022-10-13 | Stop reason: HOSPADM

## 2022-10-11 RX ADMIN — DIAZEPAM 10 MG: 5 TABLET ORAL at 21:07

## 2022-10-11 RX ADMIN — SODIUM CHLORIDE 1000 ML: 9 INJECTION, SOLUTION INTRAVENOUS at 14:47

## 2022-10-11 RX ADMIN — ACETAMINOPHEN 650 MG: 325 TABLET, FILM COATED ORAL at 17:58

## 2022-10-11 RX ADMIN — LORAZEPAM 1 MG: 2 INJECTION INTRAMUSCULAR; INTRAVENOUS at 15:19

## 2022-10-11 RX ADMIN — DEXTROSE MONOHYDRATE: 100 INJECTION, SOLUTION INTRAVENOUS at 16:24

## 2022-10-11 RX ADMIN — FAMOTIDINE 20 MG: 10 INJECTION, SOLUTION INTRAVENOUS at 16:58

## 2022-10-11 RX ADMIN — DIAZEPAM 10 MG: 5 TABLET ORAL at 17:58

## 2022-10-11 RX ADMIN — AMLODIPINE BESYLATE 5 MG: 5 TABLET ORAL at 21:06

## 2022-10-11 RX ADMIN — PIPERACILLIN AND TAZOBACTAM 3.38 G: 3; .375 INJECTION, POWDER, FOR SOLUTION INTRAVENOUS at 17:21

## 2022-10-11 RX ADMIN — MAGNESIUM SULFATE HEPTAHYDRATE 2 G: 40 INJECTION, SOLUTION INTRAVENOUS at 15:41

## 2022-10-11 RX ADMIN — THIAMINE HYDROCHLORIDE 100 MG: 100 INJECTION, SOLUTION INTRAMUSCULAR; INTRAVENOUS at 16:19

## 2022-10-11 RX ADMIN — GABAPENTIN 1200 MG: 600 TABLET, FILM COATED ORAL at 17:58

## 2022-10-11 RX ADMIN — FOLIC ACID 1 MG: 5 INJECTION, SOLUTION INTRAMUSCULAR; INTRAVENOUS; SUBCUTANEOUS at 15:44

## 2022-10-11 RX ADMIN — IPRATROPIUM BROMIDE AND ALBUTEROL SULFATE 3 ML: 2.5; .5 SOLUTION RESPIRATORY (INHALATION) at 14:35

## 2022-10-11 RX ADMIN — SODIUM CHLORIDE 1000 ML: 9 INJECTION, SOLUTION INTRAVENOUS at 12:57

## 2022-10-11 RX ADMIN — THIAMINE HYDROCHLORIDE: 100 INJECTION, SOLUTION INTRAMUSCULAR; INTRAVENOUS at 17:59

## 2022-10-11 RX ADMIN — CLONIDINE HYDROCHLORIDE 0.1 MG: 0.1 TABLET ORAL at 17:58

## 2022-10-11 RX ADMIN — PIPERACILLIN AND TAZOBACTAM 3.38 G: 3; .375 INJECTION, POWDER, FOR SOLUTION INTRAVENOUS at 23:14

## 2022-10-11 RX ADMIN — DEXTROMETHORPHAN 30 MG: 30 SUSPENSION, EXTENDED RELEASE ORAL at 16:18

## 2022-10-11 ASSESSMENT — ENCOUNTER SYMPTOMS
SHORTNESS OF BREATH: 1
CONSTIPATION: 1
COUGH: 1
MYALGIAS: 1
ABDOMINAL PAIN: 1
CONFUSION: 0
CHILLS: 1
BACK PAIN: 0
APPETITE CHANGE: 1
FEVER: 0
VOMITING: 1
TREMORS: 1

## 2022-10-11 ASSESSMENT — ACTIVITIES OF DAILY LIVING (ADL)
ADLS_ACUITY_SCORE: 35
ADLS_ACUITY_SCORE: 33
ADLS_ACUITY_SCORE: 35

## 2022-10-11 NOTE — ED NOTES
Pt reported to me that her last drink was 1100 this morning and that she does drink alcohol daily.

## 2022-10-11 NOTE — PHARMACY-ADMISSION MEDICATION HISTORY
Pharmacy Note - Admission Medication History    Pertinent Provider Information:      ______________________________________________________________________    Prior To Admission (PTA) med list completed and updated in EMR.       PTA Med List   Medication Sig Last Dose     albuterol (PROAIR HFA/PROVENTIL HFA/VENTOLIN HFA) 108 (90 Base) MCG/ACT inhaler Inhale 2 puffs into the lungs every 4 hours as needed for shortness of breath / dyspnea or wheezing 10/11/2022 at (has own med)     azelastine (OPTIVAR) 0.05 % ophthalmic solution Place 1 drop into both eyes 2 times daily as needed 10/10/2022 at (can get own)     benzonatate (TESSALON) 100 MG capsule Take 1 capsule (100 mg) by mouth 3 times daily as needed for cough 10/10/2022     calcitRIOL (ROCALTROL) 0.5 MCG capsule Take 1 capsule (0.5 mcg) by mouth At Bedtime 10/10/2022     fexofenadine (ALLEGRA) 180 MG tablet Take 1 tablet (180 mg) by mouth At Bedtime 10/10/2022     gabapentin (NEURONTIN) 300 MG capsule Take 1 capsule (300 mg) by mouth daily 10/11/2022 at (dose in ED)     JUNEL 1/20, 21, 1-20 mg-mcg per tablet [JUNEL 1/20, 21, 1-20 MG-MCG PER TABLET] TAKE 1 TABLET BY MOUTH EVERY DAY 10/10/2022 at (can get own)     levothyroxine (SYNTHROID) 150 MCG tablet Take 1 tablet (150 mcg) by mouth daily 10/11/2022     melatonin 3 MG tablet Take 3 mg by mouth At Bedtime 10/10/2022     omeprazole (PRILOSEC) 20 MG capsule [OMEPRAZOLE (PRILOSEC) 20 MG CAPSULE] TAKE 1 CAPSULE BY MOUTH EVERY DAY 10/10/2022     SUMAtriptan (IMITREX) 50 MG tablet [SUMATRIPTAN (IMITREX) 50 MG TABLET] TAKE 1/2 TABLET (25 MG TOTAL) BY MOUTH EVERY 2 HOURS AS NEEDED FOR MIGRAINE. 9/27/2022     tacrolimus (PROTOPIC) 0.1 % external ointment 2 times daily as needed 10/4/2022 at (can get own)     triamcinolone (KENALOG) 0.1 % external ointment Apply topically 2 times daily as needed for irritation (to hands) 10/4/2022 at (can get own)     vitamin B-12 (CYANOCOBALAMIN) 500 MCG tablet Take 500 mcg by mouth  daily 10/10/2022       Information source(s): Patient and CareEverywhere/SureScripts  Method of interview communication: in-person    Summary of Changes to PTA Med List  New: albuterol inh, benzonatate  Discontinued: montelukast, oloptadine, valacyclovir, azelastine  Changed: gabapentin--taking 300mg qday    Patient was asked about OTC/herbal products specifically.  PTA med list reflects this.    In the past week, patient estimated taking medication this percent of the time:  greater than 90%.    Allergies were reviewed, assessed, and updated with the patient.      Medications available for use during hospital stay: has albuterol inhaler with her and  states he can obtain other multidose medications if needed. .     The information provided in this note is only as accurate as the sources available at the time of the update(s).    Thank you for the opportunity to participate in the care of this patient.    Augustus Ortega RP  10/11/2022 6:39 PM

## 2022-10-11 NOTE — ED TRIAGE NOTES
Pt started to have shortness of breath since last Tuesday.  Went to  yesterday.  Had elevated d dimer, - CTA scan.  Her shortness of breath got much worse today and she has started to have tremors.  Pt unable to keep any food down for 2 days.     Triage Assessment     Row Name 10/11/22 1237       Triage Assessment (Adult)    Airway WDL WDL       Respiratory WDL    Respiratory WDL WDL       Skin Circulation/Temperature WDL    Skin Circulation/Temperature WDL WDL       Cardiac WDL    Cardiac WDL WDL       Peripheral/Neurovascular WDL    Peripheral Neurovascular WDL WDL       Cognitive/Neuro/Behavioral WDL    Cognitive/Neuro/Behavioral WDL WDL

## 2022-10-11 NOTE — H&P
"Admission History & Physical  Joan Christianson, 1975, [id number]    [facility]  Yamilka Craven, [phone]    Assessment and Plan:  Alcohol intoxication  Alcohol withdrawal syndrome  Alcoholic ketoacidosis, lactic acidosis  Supportive treatment  IV fluids  Monitor electrolytes  Chest x-ray, CT chest, UA negative  Abdominal exam is benign.  Monitor electrolytes    Hypokalemia  Hypomagnesemia  Replace per protocol.    Alcoholic hepatitis  Monitor    Alcohol abuse, dependence   to see  Alcohol withdrawal protocol    URI  Supportive treatment and  Influenza, COVID-negative.    Anxiety depression  Continue home medications    History of thyroid cancer s/p thyroidectomy  Postsurgical hypothyroidism  Continue home medications  Home medication list to be reviewed  DVT prophylaxis, early ambulation  Expected length of stay : anticipate hospitalization more than 2 days.     Chief Complaint: cough     HPI:     Joan Christianson is a 46 year old old female past medical history significant for alcohol abuse, dependence, anxiety, depression, thyroid carcinoma status post thyroidectomy, postsurgical hypothyroidism presented to the emergency room with complaints of recent URI symptoms, started about a week ago and she was seen at urgent care on 10/10 and had a CTA chest that was negative for any acute changes.  Today she had onset of a cough and subsequently she presented to the ED.  She had initial sore throat nasal congestion.  Denied any fevers any chills.  Not able to bring up much phlegm with the cough.  Has generalized body aches.  Was tested negative for COVID on 10/10.  Denies any chest pain, urinary bowel complaints.  She had decreased oral intake in the last few days.  She denies any other recreational drug use or ingestion.  For alcohol she had a treatment in 2007 and remained sober for many years but recently started drinking, when asked how much she replied \"more than she " "should\".  Last drink was this morning.    In ED she was tachycardic, hypertensive, lab work-up revealed high anion gap metabolic acidosis, lactic acidosis 6.3 increased to 7.6, magnesium 1.7, potassium 3.3 .  ABG showed pH of 7.41, PCO2 31, bicarb of 21.  Alcohol level of 225.  Chest x-ray was negative.  Rapid flu was negative.  She was given normal saline bolus, magnesium potassium, diazepam and admitted.    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  10/29/18 total thyroidectomy-  The pathology report shows 2 separate 2 foci of papillary carcinoma in the right lobe measuring 1.3 cm and 0.1 cm.  Tumor did extend microscopically into the perithyroidal adipose tissue and was present at the inked posterior surgical margin.  There was no lymphovascular invasion by tumor.  A superior right parathyroid gland measuring 4 mm was seen in the pathologic specimen.    The left lobe showed benign nodular hyperplasia, as of the right.  There is no malignancy in the left lobe.  There is a 2.4 cm metastatic right level 6 lymph node containing papillary thyroid carcinoma.  Extracapsular extension was present.       FINDINGS: 24-hour uptake is 4.6%. Foci of uptake in the midline neck, which  suggests some residual thyroid tissue. Uptake in the salivary glands, stomach,  colon and urinary bladder as normal routes of excretion. No evidence of distant " Metastasis  1/2019 ablated with 50.3 mCi I-131  1 week scan follow up after ablation-NM ONCOLOGY THYROID WHOLE BODY POST TREATMENT  1/17/2019 2:44 PM    CONCLUSION: No evidence of additional metastatic disease    Results for DAVID SERNA (MRN [id number]) as of 3/3/2019 12:00   Ref. Range 11/15/2018 08:15 1/2/2019 11:15 2/22/2019 12:51   THYROGLOBULIN Latest Ref Range: <=33.0 ng/ml 11.1  4.2   ANTI-THYROGLOBULIN Latest Ref Range: <4.0 IU/mL <1.0  <1.0     2/13/20 ultrasound -IMPRESSION:  1.  Thyroidectomy. A 2.5 cm mass in the lower right resection bed is suspicious  for residual disease or recurrence.     2.  Bilateral neck lymph nodes are suspicious for rachna metastatic disease,  despite their small sizes    2/21/20 ultrasound guide FNA-  Specimens:   A) - Right neck, lower nodule                                                                        B) - Right Neck Lymph Node                                                                           C) - Left Neck Lymph Node                                                                 Final Diagnosis   A) RIGHT LOWER NECK NODULE, ULTRASOUND GUIDED FNA:      Positive for malignancy, papillary carcinoma     B) RIGHT LOWER NECK LYMPH NODE, ULTRASOUND GUIDED FNA:      Positive for malignancy, papillary carcinoma     C) LEFT NECK LYMPH NODE, ULTRASOUND GUIDED FNA:      nondiagnostic - blood only     3/2020-Results for DAVID SERNA (MRN [id number]) as of 3/8/2020 10:42   Ref. Range 9/23/2019 12:35 12/9/2019 09:12 2/10/2020 12:48 3/2/2020 08:02   TSH Latest Ref Range: 0.35 - 4.94 uIU/mL   0.55 0.08 (L)   T4,FREE Latest Ref Range: 0.70 - 1.80 ng/dL   1.22    THYROGLOBULIN Latest Ref Range: <=33.0 ng/ml 3.8 6.4 2.6 11.0( before thyrogen)   ANTI-THYROGLOBULIN Latest Ref Range: <4.0 IU/mL <1.0 <1.0 <1.0 <1.0     3/2020 negative thyrogen induced whole body scan    3/2020 neck CT-IMPRESSION:   1.  Patient has undergone a previous thyroidectomy. Along the posterior  "aspect  of the right thyroidectomy bed, there is a somewhat mixed density, but mainly  low density/cystic appearing, lesion measuring 2.5 cm x 2 cm x 1.2 cm which has  been biopsied showing recurrent papillary cancer. This has similar measurements  to the ultrasound 02/13/2020 at this site. This lesion has a small crescentic  area of hyperdensity which may represent a small amount of residual thyroid  tissue versus tumor. There is a small 6 mm node adjacent to this lesion,  concerning for metastatic disease.  2.  There are at least 4 small right level 3 and level 4 lymph nodes in the  anterior right low neck region measuring up to 7 mm in transverse diameter. They  have a somewhat low density characteristic by CT and are concerning for  metastatic nodes in level 3 and level 4.  3.  There are a few tiny left level 2 cervical nodes, nonspecific in nature.  4.  There is a 9 mm isodense right jugulodigastric node nonspecific in nature.  This may not be metastatic.  5.  Remainder of the neck soft tissues unremarkable    4/6/20-underwent bilateral lateral neck dissection and reoperative central neck dissection  for management of papillary thyroid cancer with Dr Elliott at [facility]-  A.  Lymph nodes, right lateral neck levels II, III, IV, and  V, dissection:  Multiple (3 of 21) lymph nodes are involved  by metastatic papillary thyroid carcinoma.  B.  Lymph nodes, level VI central neck, dissection:  Multiple (6 of 7) lymph nodes are involved by metastatic  papillary thyroid carcinoma.  C.  Lymph nodes, left neck levels II, III, IV, and V,  dissection:  Multiple (11) lymph nodes are negative for  tumor.  Signed by Elkin Perez M.D. [phone] 4/6/2020 11:58 AM      Gross Description A.  Received fresh labeled \"right lateral neck levels II,  III, IV, and V lymph nodes\" is a 4.7 x 3.5 x 0.9 cm adipose  and lymphatic tissue with a 1.1 cm grossly positive lymph  node.  Lymph nodes submitted for frozen and permanent  sections. " " Grossed by AMM.  B.  Received fresh labeled \"level VI central neck lymph  nodes\" is a 4.4 x 3.2 x 1 cm aggregate of adipose and  lymphatic tissue.  Lymph nodes submitted for frozen and  permanent sections.  Photographed.  Grossed by RWK.  C.  Received fresh labeled \"left neck levels II, III, IV,  and V lymph nodes\" is a 4.6 x 4.5 x 1.5 cm aggregate of  adipose and lymphatic tissue.  No grossly positive lymph  nodes identified.  Lymph nodes submitted for frozen and  permanent sections.  Grossed by AMM.     Block Summary A Right lateral neck level 2,3,4,5 lymph nodes  A1 Right lateral neck level 2,3,4,5 lymph nodes 6(A1)  A2 Right lateral neck level 2,3,4,5 lymph nodes 3(A2)  A3 Right lateral neck level 2,3,4,5 lymph nodes 2(A3)  A4 Right lateral neck level 2,3,4,5 lymph nodes 6(A4)  A5 Right lateral neck level 2,3,4,5 lymph nodes 4(A5)  A6 Right lateral neck level 2,3,4,5 lymph nodes 3(A6)  B Level 6 central neck lymph nodes  B1 Level 6 central neck lymph nodes 5(B1)  B2 Level 6 central neck lymph nodes 1(B2)  B3 Level 6 central neck lymph nodes 1(B3)  B4 Level 6 central neck lymph nodes 1(B4)  C Left neck level 2,3,4,5 lymph nodes  C1 Left neck level 2,3,4,5 lymph nodes 6(C1)  C2 Left neck level 2,3,4,5 lymph nodes 4(C2)  C3 Left neck level 2,3,4,5 lymph nodes 4(C3)     Interpretation FINAL DIAGNOSIS  A.  Lymph nodes, right lateral neck levels II, III, IV, and  V, dissection:  Multiple (3 of 21) lymph nodes are involved  by metastatic papillary thyroid carcinoma, largest  metastatic deposit measures 1.1 cm in greatest dimension.  There is no extra-rachna extension.  B.  Lymph nodes, level VI central neck, dissection:  Multiple (6 of 7) lymph nodes are involved by metastatic  papillary thyroid carcinoma. A mild to moderately  hypercellular parathyroid is present in surrounding adipose  tissue.  C.  Lymph nodes, left neck levels II, III, IV, and V,  dissection:  Multiple (11) lymph nodes are negative for  tumor.  "     9/2020 negative neck ultrasound     Our typical goal with thyroid cancer of this nature is to keep the TSH between 0.05-0.2 for the first five years after diagnosis, or most recent resection of thyroid tumor, then between 0.1-0.4 x 5 years, then 0.3-1.5 thereafter.    DATE/TIME: 3/2/2021 8:26 AM     INDICATION: Papillary Thyroid Carcinoma (hc), post total thyroidectomy 2018, resection of recurrence April 2020  COMPARISON: Thyroid ultrasound 09/02/2020  TECHNIQUE: Thyroid ultrasound.      FINDINGS:  Post total thyroidectomy. No suspicious mass within the surgical bed.     NECK: Right mid cervical lymph node measuring 1.0 x 0.4 x 0.3 cm, previously 1.1 x 0.4 x 0.2 cm. Morphologically normal upper left cervical lymph node measuring 0.6 x 0.2 x 0.6 cm.        IMPRESSION:  1.  Post total thyroidectomy with no sonographic evidence of residual or recurrent tumor       History of thyroid cancer 10/23/2018     Priority: Medium     Former smoker 10/23/2018     Priority: Medium     ETOH abuse, stopped March 2018 04/19/2018     Priority: Medium     High risk human papilloma virus (HPV) infection of cervix 02/27/2018     Priority: Medium     Formatting of this note might be different from the original.  02/27/2018 NIL/HPV+, HPV 16/18 Negative  03/09/2021 NIL/HPV Negative    PLAN: Pap/HPV testing due 3/2024          Surgical History  She  has a past surgical history that includes REMOVAL OF TONSILS,<12 Y/O and Biopsy breast (Left, 03/2012).     Past Surgical History:   Procedure Laterality Date     BIOPSY BREAST Left 03/2012     HC REMOVAL OF TONSILS,<12 Y/O      Description: Tonsillectomy;  Recorded: 07/29/2009;       Allergies  Allergies   Allergen Reactions     Hydrocodone Swelling     Naproxen Other (See Comments)     induced ulcer       Prior to Admission Medications   (Not in a hospital admission)      Social History  Reviewed, and she  reports that she quit smoking about 5 years ago. Her smoking use included  "cigarettes. She started smoking about 26 years ago. She has never used smokeless tobacco. She reports that she does not drink alcohol and does not use drugs.  Social History     Tobacco Use     Smoking status: Former     Types: Cigarettes     Start date: 1996     Quit date: 2017     Years since quittin.6     Smokeless tobacco: Never     Tobacco comments:     1-2 cigarettes per day   Substance Use Topics     Alcohol use: No     Comment: Alcoholic Drinks/day: Previous alcohol use. Sober now.       Family History  Reviewed, and I have reviewed this patient's family history and updated it with pertinent information if needed.  Family History   Problem Relation Age of Onset     Breast Cancer Mother 50.00        DCIS     Lung Cancer Mother 55.00     Breast Cancer Cousin 39.00        paternal first-cousin     Pulmonary Embolism Father 60.00     Coronary Artery Disease Paternal Grandfather 70.00     Melanoma Maternal Aunt      Cancer Paternal Aunt         late-onset, type unknown     Melanoma Maternal Grandfather      Cancer Other         great-uncle (maternal grandfather's brother), type unknown     Cancer Other         great-aunt (maternal grandfather's sister), type unknown          Review Of Systems  Complete As per admission HPI, all other systems reviewed and negative.     Physical Exam:  Vital signs:  Temp: 98.5  F (36.9  C) Temp src: Oral BP: (!) 153/88 Pulse: (!) 122   Resp: 16 SpO2: 97 % O2 Device: None (Room air)     Weight: 68 kg (150 lb)  Estimated body mass index is 24.96 kg/m  as calculated from the following:    Height as of 10/22/18: 1.651 m (5' 5\").    Weight as of this encounter: 68 kg (150 lb).    General Appearance:  Awake Alert, orientedx3, not in any apparent distress   Head:  Normocephalic, without obvious abnormality   Eyes:  PERRL, conjunctiva/corneas clear   Throat: Oral mucosa moist   Neck: Supple,  no JVD   Lungs:   Clear to auscultation bilaterally, respirations unlabored   Chest " Wall:  No tenderness   Heart:  Regular rate and rhythm, S1, S2 normal,tachycardic no murmur   Abdomen:   Soft, non-tender, bowel sounds present,  no guarding, rigidity    Extremities:  no edema, no joint swelling   Skin: Skin color, texture, turgor normal, no rashes or lesions   Neurologic: Alert and oriented X 3, no focal deficits     Results:  Labs Ordered and Resulted from Time of ED Arrival to Time of ED Departure   BASIC METABOLIC PANEL - Abnormal       Result Value    Sodium 136      Potassium 3.3 (*)     Chloride 95 (*)     Carbon Dioxide (CO2) 17 (*)     Anion Gap 24 (*)     Urea Nitrogen 4 (*)     Creatinine 0.77      Calcium 7.3 (*)     Glucose 98      GFR Estimate >90     CBC WITH PLATELETS AND DIFFERENTIAL - Abnormal    WBC Count 5.6      RBC Count 4.35      Hemoglobin 14.7      Hematocrit 43.7       (*)     MCH 33.8 (*)     MCHC 33.6      RDW 12.3      Platelet Count 301      % Neutrophils 69      % Lymphocytes 22      % Monocytes 8      % Eosinophils 0      % Basophils 1      % Immature Granulocytes 0      NRBCs per 100 WBC 0      Absolute Neutrophils 3.8      Absolute Lymphocytes 1.2      Absolute Monocytes 0.4      Absolute Eosinophils 0.0      Absolute Basophils 0.0      Absolute Immature Granulocytes 0.0      Absolute NRBCs 0.0     LACTIC ACID WHOLE BLOOD - Abnormal    Lactic Acid 6.3 (*)    TSH WITH FREE T4 REFLEX - Abnormal    TSH 0.06 (*)    KETONE BETA-HYDROXYBUTYRATE QUANTITATIVE, RAPID - Abnormal    Ketone (Beta-Hydroxybutyrate) Quantitative 1.76 (*)    ETHYL ALCOHOL LEVEL - Abnormal    Alcohol, Blood 225 (*)    MAGNESIUM - Abnormal    Magnesium 1.7 (*)    HEPATIC FUNCTION PANEL - Abnormal    Bilirubin Total 0.5      Bilirubin Direct 0.2      Protein Total 7.7      Albumin 3.9      Alkaline Phosphatase 49      AST 94 (*)     ALT 47 (*)    TROPONIN I - Normal    Troponin I 0.02     LIPASE - Normal    Lipase 34     T4 FREE   BLOOD GAS VENOUS   ROUTINE UA WITH MICROSCOPIC REFLEX TO  CULTURE   D DIMER QUANTITATIVE   COVID-19 VIRUS (CORONAVIRUS) BY PCR   BLOOD CULTURE   BLOOD CULTURE      CTA chest 10/10/2022 at urgent care  1.  Mild motion.     2.  No obvious findings to explain symptoms.     3.  No pulmonary embolism identified.    EKG:  EKG: Sinus tachycardia 110/min no other acute ST-T wave changes    Imaging:   No results found.      Rosa Martinez M.D  [facility]  Internal Medicine    10/11/2022  4:14 PM

## 2022-10-11 NOTE — ED PROVIDER NOTES
EMERGENCY DEPARTMENT ENCOUNTER      NAME: Joan Christianson  AGE: 46 year old female  YOB: 1975  MRN: [id number]  EVALUATION DATE & TIME: No admission date for patient encounter.    PCP: Yamilka Craven    ED PROVIDER: Michele Wetzel MD        Chief Complaint   Patient presents with     Shortness of Breath         FINAL IMPRESSION:  1. Alcoholic ketoacidosis    2. Alcohol dependence with uncomplicated withdrawal (H)    3. Hypomagnesemia    4. Acute cough    5. Metabolic acidosis          ED COURSE & MEDICAL DECISION MAKING:    Pertinent Labs & Imaging studies reviewed. (See chart for details)  46 year old female presents to the Emergency Department for evaluation of shortness of breath, some tremors noted by , some vomiting    Patient is tachycardic in triage, she is hypertensive, she is flushed.  I reviewed labs emergency room yesterday and she had a metabolic acidosis with increased anion gap yesterday which was not fully evaluated.    She did CTA PE yesterday without evidence of pneumonia, pulmonary emboli, or pericardial effusion    She denies being a drinker of alcohol but that her blood test come back and she is almost 3 times legal limit.  She states last drink was yesterday.  She does have some evidence of minor withdrawal therefore given Ativan and placed on CIWA protocol.    Also considered sepsis, anemia, anxiety, starvation ketosis, diabetic ketosis, diarrhea    Patient is adamant she is not drinking any toxic alcohols    Lab called saying lactic acid critically high at greater than 6.  Zosyn and 30 cc/kg IV fluids ordered    Chest x-ray negative    Abdominal exam no guarding or rigidity or tenderness    Labs notable for mild elevation ALT and AST in 2-1 pattern suggestive of alcoholic hepatitis    Thiamine and folate given.  EKG shows sinus tachycardia.  Slightly decreased magnesium.  IV magnesium ordered.  Prolonged QTC on EKG therefore no antiemetics given.    TSH low free T4  pending.  Patient is not on the thyroid gland anymore therefore thyrotoxicosis unlikely especially since patient states she is not taking more of her thyroid medicines than prescribed and she is gaining weight.  CEMA colitis unlikely with no abdominal pain or abdominal tenderness.     VBG shows mild metabolic acidosis.     COVID-19 yesterday    Lipase normal.  Cholangitis unlikely    I suspect pt has a URI and is short of breath secondary to metabolic lactic acidosis likely secondary to alcohol/starvation ketosis.    Discussed with hospitalist who agrees admit to cardiac telemetry        2:32 PM I spoke with lab regarding patient, lactic acid could be high with 6.3.  2:51 PM I met with the patient to gather history and to perform my initial exam. I discussed the plan for care while in the Emergency Department. PPE (gloves, N95 mask, face shield) was worn during patient encounters. I discussed lab results with patient as well as fluid treatment plan.  4:09 PM I spoke with hospitalist, Dr. Martinez, regarding patient who they accept for admission.  5:52 PM I reevaluated patient.      The patient is critically ill and has required 30 minutes of critical care time exclusive of procedures. This includes time spent interviewing the patient, ordering tests and medications, monitoring vital signs, reviewing results, patient updates, discussing the case with family and consultants, and admission.      At the conclusion of the encounter I discussed the results of all of the tests and the disposition. The questions were answered. The patient or family acknowledged understanding and was agreeable with the care plan.         MEDICATIONS GIVEN IN THE EMERGENCY:  Medications   flumazenil (ROMAZICON) injection 0.2 mg (has no administration in time range)   diazepam (VALIUM) tablet 10 mg (10 mg Oral Given 10/11/22 1758)     Or   diazepam (VALIUM) injection 5-10 mg ( Intravenous See Alternative 10/11/22 1758)   cloNIDine (CATAPRES)  tablet 0.1 mg (0.1 mg Oral Given 10/11/22 1758)   OLANZapine zydis (zyPREXA) ODT half-tab 5-10 mg (has no administration in time range)     Or   haloperidol lactate (HALDOL) injection 2.5-5 mg (has no administration in time range)   flumazenil (ROMAZICON) injection 0.2 mg (has no administration in time range)   melatonin tablet 5 mg (has no administration in time range)   gabapentin (NEURONTIN) capsule 900 mg (has no administration in time range)   gabapentin (NEURONTIN) capsule 600 mg (has no administration in time range)   gabapentin (NEURONTIN) capsule 300 mg (has no administration in time range)   gabapentin (NEURONTIN) capsule 100 mg (has no administration in time range)   diazepam (VALIUM) tablet 10 mg (has no administration in time range)     Or   diazepam (VALIUM) injection 5-10 mg (has no administration in time range)   thiamine (B-1) tablet 100 mg (has no administration in time range)   folic acid (FOLVITE) tablet 1 mg (has no administration in time range)   multivitamin w/minerals (THERA-VIT-M) tablet 1 tablet (has no administration in time range)   lidocaine 1 % 0.1-1 mL (has no administration in time range)   lidocaine (LMX4) cream (has no administration in time range)   sodium chloride (PF) 0.9% PF flush 3 mL (has no administration in time range)   sodium chloride (PF) 0.9% PF flush 3 mL (has no administration in time range)   acetaminophen (TYLENOL) tablet 650 mg (650 mg Oral Given 10/11/22 1758)     Or   acetaminophen (TYLENOL) Suppository 650 mg ( Rectal See Alternative 10/11/22 1758)   melatonin tablet 1 mg (has no administration in time range)   polyethylene glycol (MIRALAX) Packet 17 g (has no administration in time range)   ondansetron (ZOFRAN ODT) ODT tab 4 mg (has no administration in time range)     Or   ondansetron (ZOFRAN) injection 4 mg (has no administration in time range)   lactated ringers infusion (has no administration in time range)   guaiFENesin (ROBITUSSIN) 20 mg/mL solution 10 mL  "(has no administration in time range)   0.9% sodium chloride BOLUS (0 mLs Intravenous Stopped 10/11/22 1430)   ipratropium - albuterol 0.5 mg/2.5 mg/3 mL (DUONEB) neb solution 3 mL (3 mLs Nebulization Given 10/11/22 1435)   0.9% sodium chloride BOLUS (0 mLs Intravenous Stopped 10/11/22 1625)   magnesium sulfate 2 g in water intermittent infusion (0 g Intravenous Stopped 10/11/22 1625)   LORazepam (ATIVAN) injection 1 mg (1 mg Intravenous Given 10/11/22 1519)   folic acid injection 1 mg (1 mg Intravenous Given 10/11/22 1544)   thiamine (B-1) injection 100 mg (100 mg Intravenous Given 10/11/22 1619)   dextromethorphan (DELSYM) liquid 30 mg (30 mg Oral Given 10/11/22 1618)   dextrose 10% infusion ( Intravenous New Bag 10/11/22 1624)   piperacillin-tazobactam (ZOSYN) 3.375 g vial to attach to  mL bag (3.375 g Intravenous Given 10/11/22 1721)   famotidine (PEPCID) injection 20 mg (20 mg Intravenous Given 10/11/22 1658)   gabapentin (NEURONTIN) tablet 1,200 mg (1,200 mg Oral Given 10/11/22 1758)   sodium chloride 0.9 % 1,000 mL with Infuvite Adult 10 mL, thiamine 100 mg, folic acid 1 mg infusion ( Intravenous New Bag 10/11/22 1759)       NEW PRESCRIPTIONS STARTED AT TODAY'S ER VISIT  New Prescriptions    No medications on file          =================================================================    HPI    Triage note  \"  Pt started to have shortness of breath since last Tuesday.  Went to  yesterday.  Had elevated d dimer, - CTA scan.  Her shortness of breath got much worse today and she has started to have tremors.  Pt unable to keep any food down for 2 days.     Triage Assessment     Row Name 10/11/22 4889       Triage Assessment (Adult)    Airway WDL WDL       Respiratory WDL    Respiratory WDL WDL       Skin Circulation/Temperature WDL    Skin Circulation/Temperature WDL WDL       Cardiac WDL    Cardiac WDL WDL       Peripheral/Neurovascular WDL    Peripheral Neurovascular WDL WDL       " "Cognitive/Neuro/Behavioral WDL    Cognitive/Neuro/Behavioral WDL WDL              \"      Patient information was obtained from: Patient    Use of : N/A        Joan Christianson is a 46 year old female with a pertinent history of alcohol abuse, former smoker, anxiety, major depressive disorder, 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    Patient states that she went to [place] on 9/28 (~2 weeks ago) and returned 10/3 (~1 week ago) when she developed a dry cough that productively worsened. She has associated chest pain and abdominal pain. She notes that she attempted to relieve cough with robitussin but \"had a hard time keeping it down\" as yesterday she vomited it up. She endorses a decreased appetite and difficulty lifting her head up as it feels \"heavy\". Patient endorses muscle aches, chills, constipation due to decreased water intake, tremors, and shortness of breath. Patient denies fever, back pain, or chance of pregnancy.    When asked if she was sober from alcohol, patient stated that she had been \"sober for awhile\". When asked again, patient noted that her last alcoholic beverage was yesterday. She states that when she drinks she \"drinks a lot\" which she notes is 4-5 drinks a day. She denies consumption of \"garage shelf\" alcohol.    Patient states that she had her thyroid removed and is on medication to maintain this. She also endorses taking estrogen, B12, potassium, melatonin, and 1 alleve a day. She denies taking iron. Patient notes that she has been gaining more weight since her thyroid was removed.      REVIEW OF SYSTEMS   Review of Systems   Constitutional: Positive for appetite change (loss) and chills. Negative for fever.   HENT: Negative for drooling.    Respiratory: Positive for cough and shortness of breath.    Cardiovascular: Positive for chest pain (with " cough).   Gastrointestinal: Positive for abdominal pain (with cough), constipation (lack of water) and vomiting.   Musculoskeletal: Positive for myalgias (head and neck). Negative for back pain.   Allergic/Immunologic: Negative for immunocompromised state.   Neurological: Positive for tremors.   Psychiatric/Behavioral: Negative for confusion.       PAST MEDICAL HISTORY:  Past Medical History:   Diagnosis Date     Acute pyelonephritis      Anxiety 4/19/2018     Major depressive disorder        PAST SURGICAL HISTORY:  Past Surgical History:   Procedure Laterality Date     BIOPSY BREAST Left 03/2012     HC REMOVAL OF TONSILS,<12 Y/O      Description: Tonsillectomy;  Recorded: 07/29/2009;           CURRENT MEDICATIONS:    azelastine (ASTELIN) 0.1 % nasal spray  azelastine (OPTIVAR) 0.05 % ophthalmic solution  calcitRIOL (ROCALTROL) 0.5 MCG capsule  fexofenadine (ALLEGRA) 180 MG tablet  gabapentin (NEURONTIN) 300 MG capsule  JUNEL 1/20, 21, 1-20 mg-mcg per tablet  levothyroxine (SYNTHROID) 150 MCG tablet  melatonin 3 MG tablet  montelukast (SINGULAIR) 10 MG tablet  olopatadine (PATANOL) 0.1 % ophthalmic solution  omeprazole (PRILOSEC) 20 MG capsule  SUMAtriptan (IMITREX) 50 MG tablet  tacrolimus (PROTOPIC) 0.1 % external ointment  valACYclovir (VALTREX) 1000 MG tablet  vitamin B-12 (CYANOCOBALAMIN) 500 MCG tablet        ALLERGIES:  Allergies   Allergen Reactions     Hydrocodone Swelling     Naproxen Other (See Comments)     induced ulcer       FAMILY HISTORY:  Family History   Problem Relation Age of Onset     Breast Cancer Mother 50.00        DCIS     Lung Cancer Mother 55.00     Breast Cancer Cousin 39.00        paternal first-cousin     Pulmonary Embolism Father 60.00     Coronary Artery Disease Paternal Grandfather 70.00     Melanoma Maternal Aunt      Cancer Paternal Aunt         late-onset, type unknown     Melanoma Maternal Grandfather      Cancer Other         great-uncle (maternal grandfather's brother), type  unknown     Cancer Other         great-aunt (maternal grandfather's sister), type unknown       SOCIAL HISTORY:   Social History     Socioeconomic History     Marital status:    Tobacco Use     Smoking status: Former     Types: Cigarettes     Start date: 1996     Quit date: 2017     Years since quittin.6     Smokeless tobacco: Never     Tobacco comments:     1-2 cigarettes per day   Substance and Sexual Activity     Alcohol use: No     Comment: Alcoholic Drinks/day: Previous alcohol use. Sober now.     Drug use: No       VITALS:  BP (!) 157/97   Pulse 112   Temp 98.5  F (36.9  C) (Oral)   Resp 30   Wt 68 kg (150 lb)   SpO2 95%   BMI 24.96 kg/m      PHYSICAL EXAM      Vitals: BP (!) 157/97   Pulse 112   Temp 98.5  F (36.9  C) (Oral)   Resp 30   Wt 68 kg (150 lb)   SpO2 95%   BMI 24.96 kg/m    General: Appears in no acute distress, awake, alert, interactive. Flushed face.  Eyes: Conjunctivae non-injected. Sclera anicteric.  HENT: Atraumatic.  Neck: Supple.  Respiratory/Chest: Respiration unlabored. Lungs clear to auscultation. Persistent dry cough. 2+ radial pulse, tachycardiac. Dry mucus membranes.  Abdomen: Non distended and soft  Musculoskeletal: Normal extremities. No edema or erythema.  Skin: Normal color. No rash or diaphoresis.  Neurologic: Face symmetric. Speech clear. Mild tremors with arms raised.  Psychiatric: Oriented to person, place, and time. Affect appropriate.       LAB:  All pertinent labs reviewed and interpreted.  Results for orders placed or performed during the hospital encounter of 10/11/22   Chest XR,  PA & LAT    Impression    IMPRESSION: Negative chest.   Basic metabolic panel   Result Value Ref Range    Sodium 136 136 - 145 mmol/L    Potassium 3.3 (L) 3.5 - 5.0 mmol/L    Chloride 95 (L) 98 - 107 mmol/L    Carbon Dioxide (CO2) 17 (L) 22 - 31 mmol/L    Anion Gap 24 (H) 5 - 18 mmol/L    Urea Nitrogen 4 (L) 8 - 22 mg/dL    Creatinine 0.77 0.60 - 1.10 mg/dL     Calcium 7.3 (L) 8.5 - 10.5 mg/dL    Glucose 98 70 - 125 mg/dL    GFR Estimate >90 >60 mL/min/1.73m2   Troponin I (now)   Result Value Ref Range    Troponin I 0.02 0.00 - 0.29 ng/mL   CBC with platelets and differential   Result Value Ref Range    WBC Count 5.6 4.0 - 11.0 10e3/uL    RBC Count 4.35 3.80 - 5.20 10e6/uL    Hemoglobin 14.7 11.7 - 15.7 g/dL    Hematocrit 43.7 35.0 - 47.0 %     (H) 78 - 100 fL    MCH 33.8 (H) 26.5 - 33.0 pg    MCHC 33.6 31.5 - 36.5 g/dL    RDW 12.3 10.0 - 15.0 %    Platelet Count 301 150 - 450 10e3/uL    % Neutrophils 69 %    % Lymphocytes 22 %    % Monocytes 8 %    % Eosinophils 0 %    % Basophils 1 %    % Immature Granulocytes 0 %    NRBCs per 100 WBC 0 <1 /100    Absolute Neutrophils 3.8 1.6 - 8.3 10e3/uL    Absolute Lymphocytes 1.2 0.8 - 5.3 10e3/uL    Absolute Monocytes 0.4 0.0 - 1.3 10e3/uL    Absolute Eosinophils 0.0 0.0 - 0.7 10e3/uL    Absolute Basophils 0.0 0.0 - 0.2 10e3/uL    Absolute Immature Granulocytes 0.0 <=0.4 10e3/uL    Absolute NRBCs 0.0 10e3/uL   Lactic acid whole blood   Result Value Ref Range    Lactic Acid 6.3 (HH) 0.7 - 2.0 mmol/L   TSH with free T4 reflex   Result Value Ref Range    TSH 0.06 (L) 0.30 - 5.00 uIU/mL   Ketone Beta-Hydroxybutyrate Quantitative   Result Value Ref Range    Ketone (Beta-Hydroxybutyrate) Quantitative 1.76 (H) <=0.3 mmol/L   Alcohol level blood   Result Value Ref Range    Alcohol, Blood 225 (H) None detected mg/dL   Result Value Ref Range    Magnesium 1.7 (L) 1.8 - 2.6 mg/dL   Result Value Ref Range    Lipase 34 0 - 52 U/L   Hepatic function panel   Result Value Ref Range    Bilirubin Total 0.5 0.0 - 1.0 mg/dL    Bilirubin Direct 0.2 <=0.5 mg/dL    Protein Total 7.7 6.0 - 8.0 g/dL    Albumin 3.9 3.5 - 5.0 g/dL    Alkaline Phosphatase 49 45 - 120 U/L    AST 94 (H) 0 - 40 U/L    ALT 47 (H) 0 - 45 U/L   Result Value Ref Range    Free T4 1.53 0.90 - 1.70 ng/dL   Blood gas venous   Result Value Ref Range    pH Venous 7.34 (L) 7.35 - 7.45     pCO2 Venous 34 (L) 35 - 50 mm Hg    pO2 Venous 73 (H) 25 - 47 mm Hg    Bicarbonate Venous 19 (L) 24 - 30 mmol/L    Base Excess/Deficit (+/-) -7.5   mmol/L    Oxyhemoglobin Venous 90.7 (H) 70.0 - 75.0 %    O2 Sat, Venous 92.6 (H) 70.0 - 75.0 %   Result Value Ref Range    Phosphorus 3.3 2.5 - 4.5 mg/dL   Lactic acid whole blood   Result Value Ref Range    Lactic Acid 7.6 (HH) 0.7 - 2.0 mmol/L   Result Value Ref Range    Phosphorus 2.8 2.5 - 4.5 mg/dL   Blood gas arterial   Result Value Ref Range    pH Arterial 7.41 7.37 - 7.44    pCO2 Arterial 31 (L) 35 - 45 mm Hg    pO2 Arterial 92 (H) 80 - 90 mm Hg    Bicarbonate Arterial 21 (L) 23 - 29 mmol/L    O2 Sat, Arterial 96.8 96.0 - 97.0 %    Oxyhemoglobin 95.5 (L) 96.0 - 97.0 %    Base Excess/Deficit (+/-) -4.7   mmol/L    FIO2      Sample Stabilized Temperature 37.0 degrees C   ECG 12-LEAD WITH MUSE (LHE)   Result Value Ref Range    Systolic Blood Pressure  mmHg    Diastolic Blood Pressure  mmHg    Ventricular Rate 110 BPM    Atrial Rate 110 BPM    AK Interval 118 ms    QRS Duration 80 ms     ms    QTc 511 ms    P Axis 65 degrees    R AXIS 66 degrees    T Axis 63 degrees    Interpretation ECG       Sinus tachycardia  Possible Left atrial enlargement  Borderline ECG  When compared with ECG of 19-SEP-2018 16:26,  Vent. rate has increased BY  45 BPM  Nonspecific T wave abnormality now evident in Anterior leads  Confirmed by SEE ED PROVIDER NOTE FOR, ECG INTERPRETATION ([id number]),  CONRADO LEROY ([id number]) on 10/11/2022 2:34:40 PM     Influenza A/B antigen    Specimen: Nasopharyngeal; Swab   Result Value Ref Range    Influenza A antigen Negative Negative    Influenza B antigen Negative Negative       RADIOLOGY:  Reviewed all pertinent imaging. Please see official radiology report.  Chest XR,  PA & LAT   Final Result   IMPRESSION: Negative chest.          EKG:    Performed at: 1431    Impression: sinus tachycardia, possible left atrial enlargement    Rate:  110  Rhythm: sinus tachycardia  Axis: 66  OR Interval: 118  QRS Interval: 80  QTc Interval: 511  ST Changes: nonspecific T wave abnormality now evident in anterior leads  Comparison: when compared to EKG on 9/19, ventricular rate has increased by 45 bpm and nonspecific T wave abnormality now evident in anterior leads     I have independently reviewed and interpreted the EKG(s) documented above.    PROCEDURES:         I, Patt Sidhu, am serving as a scribe to document services personally performed by Rufus Wetzel MD based on my observation and the provider's statements to me. I, Dr. Rufus Wetzel, attest that Patt Sidhu is acting in a scribe capacity, has observed my performance of the services and has documented them in accordance with my direction.    Rufus Wetzel MD  Emergency Medicine  [facility] EMERGENCY ROOM  [address] [facility] [address]  [phone]     Rufus Wetzel MD  10/11/22 1805       Rufus Wetzel MD  10/11/22 1811       Rufus Wetzel MD  10/11/22 7024

## 2022-10-12 LAB
ALBUMIN SERPL-MCNC: 3.5 G/DL (ref 3.5–5)
ALP SERPL-CCNC: 44 U/L (ref 45–120)
ALT SERPL W P-5'-P-CCNC: 43 U/L (ref 0–45)
ANION GAP SERPL CALCULATED.3IONS-SCNC: 14 MMOL/L (ref 5–18)
AST SERPL W P-5'-P-CCNC: 77 U/L (ref 0–40)
BILIRUB SERPL-MCNC: 1.1 MG/DL (ref 0–1)
BUN SERPL-MCNC: 5 MG/DL (ref 8–22)
CALCIUM SERPL-MCNC: 6.6 MG/DL (ref 8.5–10.5)
CALCIUM, IONIZED MEASURED: 0.81 MMOL/L (ref 1.11–1.3)
CALCIUM, IONIZED MEASURED: 0.97 MMOL/L (ref 1.11–1.3)
CHLORIDE BLD-SCNC: 103 MMOL/L (ref 98–107)
CO2 SERPL-SCNC: 23 MMOL/L (ref 22–31)
CREAT SERPL-MCNC: 0.68 MG/DL (ref 0.6–1.1)
ERYTHROCYTE [DISTWIDTH] IN BLOOD BY AUTOMATED COUNT: 12.2 % (ref 10–15)
GFR SERPL CREATININE-BSD FRML MDRD: >90 ML/MIN/1.73M2
GLUCOSE BLD-MCNC: 73 MG/DL (ref 70–125)
HCT VFR BLD AUTO: 38.7 % (ref 35–47)
HGB BLD-MCNC: 13 G/DL (ref 11.7–15.7)
ION CA PH 7.4: 0.84 MMOL/L (ref 1.11–1.3)
ION CA PH 7.4: 1.01 MMOL/L (ref 1.11–1.3)
LACTATE SERPL-SCNC: 0.8 MMOL/L (ref 0.7–2)
MAGNESIUM SERPL-MCNC: 1.8 MG/DL (ref 1.8–2.6)
MCH RBC QN AUTO: 34.1 PG (ref 26.5–33)
MCHC RBC AUTO-ENTMCNC: 33.6 G/DL (ref 31.5–36.5)
MCV RBC AUTO: 102 FL (ref 78–100)
PH: 7.45 (ref 7.35–7.45)
PH: 7.47 (ref 7.35–7.45)
PLATELET # BLD AUTO: 219 10E3/UL (ref 150–450)
POTASSIUM BLD-SCNC: 3.4 MMOL/L (ref 3.5–5)
POTASSIUM BLD-SCNC: 3.4 MMOL/L (ref 3.5–5)
POTASSIUM BLD-SCNC: 3.7 MMOL/L (ref 3.5–5)
PROT SERPL-MCNC: 6.5 G/DL (ref 6–8)
RBC # BLD AUTO: 3.81 10E6/UL (ref 3.8–5.2)
SODIUM SERPL-SCNC: 140 MMOL/L (ref 136–145)
WBC # BLD AUTO: 5.2 10E3/UL (ref 4–11)

## 2022-10-12 PROCEDURE — 82330 ASSAY OF CALCIUM: CPT | Performed by: INTERNAL MEDICINE

## 2022-10-12 PROCEDURE — 250N000013 HC RX MED GY IP 250 OP 250 PS 637: Performed by: HOSPITALIST

## 2022-10-12 PROCEDURE — 82310 ASSAY OF CALCIUM: CPT | Performed by: INTERNAL MEDICINE

## 2022-10-12 PROCEDURE — 36415 COLL VENOUS BLD VENIPUNCTURE: CPT | Performed by: HOSPITALIST

## 2022-10-12 PROCEDURE — 99233 SBSQ HOSP IP/OBS HIGH 50: CPT | Performed by: INTERNAL MEDICINE

## 2022-10-12 PROCEDURE — 84132 ASSAY OF SERUM POTASSIUM: CPT | Performed by: INTERNAL MEDICINE

## 2022-10-12 PROCEDURE — 83605 ASSAY OF LACTIC ACID: CPT | Performed by: INTERNAL MEDICINE

## 2022-10-12 PROCEDURE — 96376 TX/PRO/DX INJ SAME DRUG ADON: CPT

## 2022-10-12 PROCEDURE — 250N000011 HC RX IP 250 OP 636: Performed by: HOSPITALIST

## 2022-10-12 PROCEDURE — 85027 COMPLETE CBC AUTOMATED: CPT | Performed by: INTERNAL MEDICINE

## 2022-10-12 PROCEDURE — 250N000013 HC RX MED GY IP 250 OP 250 PS 637: Performed by: INTERNAL MEDICINE

## 2022-10-12 PROCEDURE — 96366 THER/PROPH/DIAG IV INF ADDON: CPT

## 2022-10-12 PROCEDURE — 82374 ASSAY BLOOD CARBON DIOXIDE: CPT | Performed by: INTERNAL MEDICINE

## 2022-10-12 PROCEDURE — 96368 THER/DIAG CONCURRENT INF: CPT

## 2022-10-12 PROCEDURE — 258N000003 HC RX IP 258 OP 636: Performed by: INTERNAL MEDICINE

## 2022-10-12 PROCEDURE — 250N000011 HC RX IP 250 OP 636: Performed by: INTERNAL MEDICINE

## 2022-10-12 PROCEDURE — 120N000001 HC R&B MED SURG/OB

## 2022-10-12 PROCEDURE — 82330 ASSAY OF CALCIUM: CPT | Performed by: HOSPITALIST

## 2022-10-12 PROCEDURE — 83735 ASSAY OF MAGNESIUM: CPT | Performed by: INTERNAL MEDICINE

## 2022-10-12 PROCEDURE — 36415 COLL VENOUS BLD VENIPUNCTURE: CPT | Performed by: INTERNAL MEDICINE

## 2022-10-12 RX ORDER — PANTOPRAZOLE SODIUM 20 MG/1
40 TABLET, DELAYED RELEASE ORAL
Status: DISCONTINUED | OUTPATIENT
Start: 2022-10-12 | End: 2022-10-13 | Stop reason: HOSPADM

## 2022-10-12 RX ORDER — ALBUTEROL SULFATE 90 UG/1
2 AEROSOL, METERED RESPIRATORY (INHALATION) EVERY 4 HOURS PRN
Status: DISCONTINUED | OUTPATIENT
Start: 2022-10-12 | End: 2022-10-13 | Stop reason: HOSPADM

## 2022-10-12 RX ORDER — FEXOFENADINE HCL 180 MG/1
180 TABLET ORAL AT BEDTIME
Status: DISCONTINUED | OUTPATIENT
Start: 2022-10-12 | End: 2022-10-13 | Stop reason: HOSPADM

## 2022-10-12 RX ORDER — POTASSIUM CHLORIDE 1500 MG/1
40 TABLET, EXTENDED RELEASE ORAL ONCE
Status: COMPLETED | OUTPATIENT
Start: 2022-10-12 | End: 2022-10-12

## 2022-10-12 RX ORDER — CALCIUM CARBONATE 500 MG/1
1000 TABLET, CHEWABLE ORAL 2 TIMES DAILY
Status: DISCONTINUED | OUTPATIENT
Start: 2022-10-12 | End: 2022-10-13 | Stop reason: HOSPADM

## 2022-10-12 RX ORDER — LEVOTHYROXINE SODIUM 150 UG/1
150 TABLET ORAL DAILY
Status: DISCONTINUED | OUTPATIENT
Start: 2022-10-12 | End: 2022-10-13 | Stop reason: HOSPADM

## 2022-10-12 RX ORDER — CALCIUM GLUCONATE 20 MG/ML
1 INJECTION, SOLUTION INTRAVENOUS EVERY 6 HOURS
Status: DISCONTINUED | OUTPATIENT
Start: 2022-10-12 | End: 2022-10-12

## 2022-10-12 RX ORDER — CALCIUM GLUCONATE 20 MG/ML
2 INJECTION, SOLUTION INTRAVENOUS EVERY 6 HOURS
Status: COMPLETED | OUTPATIENT
Start: 2022-10-12 | End: 2022-10-12

## 2022-10-12 RX ORDER — UREA 10 %
500 LOTION (ML) TOPICAL DAILY
Status: DISCONTINUED | OUTPATIENT
Start: 2022-10-12 | End: 2022-10-13 | Stop reason: HOSPADM

## 2022-10-12 RX ORDER — LORAZEPAM 2 MG/ML
0.5 INJECTION INTRAMUSCULAR ONCE
Status: COMPLETED | OUTPATIENT
Start: 2022-10-12 | End: 2022-10-12

## 2022-10-12 RX ORDER — CALCIUM GLUCONATE 20 MG/ML
2 INJECTION, SOLUTION INTRAVENOUS ONCE
Status: COMPLETED | OUTPATIENT
Start: 2022-10-12 | End: 2022-10-12

## 2022-10-12 RX ORDER — BENZONATATE 100 MG/1
100 CAPSULE ORAL 3 TIMES DAILY PRN
Status: DISCONTINUED | OUTPATIENT
Start: 2022-10-12 | End: 2022-10-13 | Stop reason: HOSPADM

## 2022-10-12 RX ORDER — CALCITRIOL 0.25 UG/1
0.5 CAPSULE, LIQUID FILLED ORAL AT BEDTIME
Status: DISCONTINUED | OUTPATIENT
Start: 2022-10-12 | End: 2022-10-13 | Stop reason: HOSPADM

## 2022-10-12 RX ADMIN — MULTIPLE VITAMINS W/ MINERALS TAB 1 TABLET: TAB at 09:00

## 2022-10-12 RX ADMIN — POTASSIUM CHLORIDE 40 MEQ: 1500 TABLET, EXTENDED RELEASE ORAL at 09:02

## 2022-10-12 RX ADMIN — CALCIUM CARBONATE (ANTACID) CHEW TAB 500 MG 1000 MG: 500 CHEW TAB at 20:20

## 2022-10-12 RX ADMIN — CLONIDINE HYDROCHLORIDE 0.1 MG: 0.1 TABLET ORAL at 16:16

## 2022-10-12 RX ADMIN — GUAIFENESIN 10 ML: 100 SOLUTION ORAL at 00:17

## 2022-10-12 RX ADMIN — SODIUM CHLORIDE, POTASSIUM CHLORIDE, SODIUM LACTATE AND CALCIUM CHLORIDE: 600; 310; 30; 20 INJECTION, SOLUTION INTRAVENOUS at 16:21

## 2022-10-12 RX ADMIN — SODIUM CHLORIDE, POTASSIUM CHLORIDE, SODIUM LACTATE AND CALCIUM CHLORIDE: 600; 310; 30; 20 INJECTION, SOLUTION INTRAVENOUS at 05:06

## 2022-10-12 RX ADMIN — GABAPENTIN 900 MG: 300 CAPSULE ORAL at 00:15

## 2022-10-12 RX ADMIN — CLONIDINE HYDROCHLORIDE 0.1 MG: 0.1 TABLET ORAL at 09:01

## 2022-10-12 RX ADMIN — CALCIUM GLUCONATE 2 G: 20 INJECTION, SOLUTION INTRAVENOUS at 19:07

## 2022-10-12 RX ADMIN — CLONIDINE HYDROCHLORIDE 0.1 MG: 0.1 TABLET ORAL at 00:15

## 2022-10-12 RX ADMIN — CALCIUM GLUCONATE 2 G: 20 INJECTION, SOLUTION INTRAVENOUS at 08:59

## 2022-10-12 RX ADMIN — PANTOPRAZOLE SODIUM 40 MG: 20 TABLET, DELAYED RELEASE ORAL at 16:17

## 2022-10-12 RX ADMIN — CALCITRIOL 0.5 MCG: 0.25 CAPSULE, LIQUID FILLED ORAL at 21:45

## 2022-10-12 RX ADMIN — FOLIC ACID 1 MG: 1 TABLET ORAL at 09:00

## 2022-10-12 RX ADMIN — GABAPENTIN 900 MG: 300 CAPSULE ORAL at 09:01

## 2022-10-12 RX ADMIN — CYANOCOBALAMIN TAB 500 MCG 500 MCG: 500 TAB at 16:20

## 2022-10-12 RX ADMIN — CALCIUM CARBONATE (ANTACID) CHEW TAB 500 MG 1000 MG: 500 CHEW TAB at 07:05

## 2022-10-12 RX ADMIN — Medication 100 MG: at 09:00

## 2022-10-12 RX ADMIN — ACETAMINOPHEN 650 MG: 325 TABLET, FILM COATED ORAL at 00:15

## 2022-10-12 RX ADMIN — LORAZEPAM 0.5 MG: 2 INJECTION INTRAMUSCULAR; INTRAVENOUS at 07:06

## 2022-10-12 RX ADMIN — GABAPENTIN 900 MG: 300 CAPSULE ORAL at 16:18

## 2022-10-12 RX ADMIN — POTASSIUM CHLORIDE 40 MEQ: 1500 TABLET, EXTENDED RELEASE ORAL at 16:19

## 2022-10-12 RX ADMIN — PIPERACILLIN AND TAZOBACTAM 3.38 G: 3; .375 INJECTION, POWDER, FOR SOLUTION INTRAVENOUS at 06:27

## 2022-10-12 RX ADMIN — FEXOFENADINE HYDROCHLORIDE 180 MG: 180 TABLET ORAL at 21:45

## 2022-10-12 ASSESSMENT — ACTIVITIES OF DAILY LIVING (ADL)
DIFFICULTY_EATING/SWALLOWING: NO
DOING_ERRANDS_INDEPENDENTLY_DIFFICULTY: NO
CHANGE_IN_FUNCTIONAL_STATUS_SINCE_ONSET_OF_CURRENT_ILLNESS/INJURY: NO
FALL_HISTORY_WITHIN_LAST_SIX_MONTHS: NO
ADLS_ACUITY_SCORE: 33
ADLS_ACUITY_SCORE: 37
ADLS_ACUITY_SCORE: 35
TOILETING_ISSUES: NO
WALKING_OR_CLIMBING_STAIRS_DIFFICULTY: NO
ADLS_ACUITY_SCORE: 37
ADLS_ACUITY_SCORE: 20
ADLS_ACUITY_SCORE: 20
ADLS_ACUITY_SCORE: 37
WEAR_GLASSES_OR_BLIND: NO
ADLS_ACUITY_SCORE: 37
CONCENTRATING,_REMEMBERING_OR_MAKING_DECISIONS_DIFFICULTY: NO
FALL_HISTORY_WITHIN_LAST_SIX_MONTHS: NO
DIFFICULTY_COMMUNICATING: NO
ADLS_ACUITY_SCORE: 37
ADLS_ACUITY_SCORE: 37
DRESSING/BATHING_DIFFICULTY: NO
DEPENDENT_IADLS:: INDEPENDENT
ADLS_ACUITY_SCORE: 37
ADLS_ACUITY_SCORE: 37

## 2022-10-12 NOTE — PROGRESS NOTES
[facility] MEDICINE PROGRESS NOTE      Identification/Summary: Joan Christianson is a 46 year old female with a past medical history of thyroid carcinoma, postsurgical hypothyroidism, alcohol abuse, dependence, anxiety, depression who presented to ED with complaint of recent URI Sxs and new onset cough. Reports nasal congestion, generalized body aches. Covid negative 10/10. Alcohol level of 225.  She was admitted for alcoholic ketoacidosis, lactic acidosis.  Treated with supportive care, electrolytes replaced.  Hypocalcemia getting replaced.    Assessment and Plan:  Alcohol intoxication- resolved   Alcohol withdrawal syndrome  Alcoholic ketoacidosis, lactic acidosis  -Supportive treatment   -On CIWA protocol   -IV fluids, thiamine  -Continue to monitor electrolytes  -Abdominal exam unremarkable  -Lactic acid improving 5.3--> 0.8  -Anion gap 24-->14     Hypocalcemia  -CMP Calcium 6.6, Ionized calcium 0.81 on 10/12  -Given IV calcium    Hypokalemia  -3.4 on 10/12  -On replacement protocol    Hypomagnesemia  -Replacement protocol    Alcohol abuse/dependence  -reports having previous support structure in place  -Social work to consult    URI  -Supportive treatment  -Covid, influenza negative   -Given guaifenesin for cough    Anxiety/depression  Continue PTA     Post surgical-hyperthyroidism  -On levothyroxine      # Hypocalcemia: Ca = 6.6 mg/dL (Ref range: 8.5 - 10.5 mg/dL) and/or iCa = N/A on admission, will replace as needed               Diet: Advance Diet as Tolerated: Regular Diet Adult  DVT Prophylaxis:  Low Risk/Ambulatory with no VTE prophylaxis indicated  Code Status: Full Code    Anticipated possible discharge in 1 days once stable electrolytes milestones are met.     Expected Discharge Date: 10/13/2022                The patient's care was discussed with the Attending Physician, Dr. Martinez.    Cabrera Woods  Physician Assistant Student   Hospital Medicine  [facility]  Phone: #[phone]    Interval History/Subjective:  Pt seems engaged in conversation. Admits current relapse was a mistake. Not currently have major withdrawal symptoms. Has involunentary clenching of hands morning of 10/12. Calcium found to be low.      Physical Exam/Objective:  Temp:  [98.1  F (36.7  C)-98.5  F (36.9  C)] 98.2  F (36.8  C)  Pulse:  [] 117  Resp:  [15-33] 33  BP: (115-185)/() 151/95  SpO2:  [95 %-99 %] 99 %  Body mass index is 24.96 kg/m .    GENERAL:  Alert, appears comfortable, in no acute distress, appears stated age   HEAD:  Normocephalic, without obvious abnormality, atraumatic   EYES:  Conjunctiva/corneas clear, EOM's intact   NOSE: Nares normal, no drainage   BACK:   Symmetric, no curvature, ROM normal   LUNGS:   Clear to auscultation bilaterally, no rales, rhonchi, or wheezing, symmetric chest rise on inhalation, respirations unlabored   CHEST WALL:  No tenderness or deformity   HEART:  Slightly tachycardic, regular rhythm, S1 and S2 normal, no murmur, rub, or gallop   ABDOMEN:   Soft, non-tender, bowel sounds active all four quadrants, no masses, no organomegaly, no rebound or guarding   EXTREMITIES: Extremities normal, atraumatic, no cyanosis or edema    SKIN: Dry to touch, no exanthems in the visualized areas   NEURO: Alert, oriented x3, moves all four extremities freely   PSYCH: Cooperative, behavior is appropriate      Data reviewed today: I personally reviewed all new medications, labs, imaging/diagnostics reports over the past 24 hours. Pertinent findings include:    Imaging:   Recent Results (from the past 24 hour(s))   Chest XR,  PA & LAT    Narrative    EXAM: XR CHEST 2 VIEWS  LOCATION: [facility]  DATE/TIME: 10/11/2022 5:08 PM    INDICATION: cough, tachycardia  COMPARISON: None.      Impression    IMPRESSION: Negative chest.       Labs:  Most Recent 3 CBC's:Recent Labs   Lab Test 10/12/22  0735 10/11/22  1253 10/22/18  1516  09/19/18  1625   WBC 5.2 5.6  --  8.5   HGB 13.0 14.7 12.9 12.5   * 101*  --  95    301  --  244     Most Recent 3 BMP's:Recent Labs   Lab Test 10/12/22  0735 10/11/22  2124 10/11/22  1253 09/19/18  1625     --  136 139   POTASSIUM 3.4* 3.5 3.3* 3.5   CHLORIDE 103  --  95* 107   CO2 23  --  17* 22   BUN 5*  --  4* 9   CR 0.68  --  0.77 0.84   ANIONGAP 14  --  24* 10   CRAIG 6.6*  --  7.3* 8.5   GLC 73  --  98 83     Most Recent 2 LFT's:Recent Labs   Lab Test 10/12/22  0735 10/11/22  1253   AST 77* 94*   ALT 43 47*   ALKPHOS 44* 49   BILITOTAL 1.1* 0.5       Medications:   Personally Reviewed.  Medications     lactated ringers 125 mL/hr at 10/12/22 0858       calcium carbonate  1,000 mg Oral BID     calcium gluconate  2 g Intravenous Q6H     cloNIDine  0.1 mg Oral Q8H     folic acid  1 mg Oral Daily     [START ON 10/19/2022] gabapentin  100 mg Oral Q8H     [START ON 10/17/2022] gabapentin  300 mg Oral Q8H     [START ON 10/15/2022] gabapentin  600 mg Oral Q8H     gabapentin  900 mg Oral Q8H     multivitamin w/minerals  1 tablet Oral Daily     polyethylene glycol  17 g Oral Daily     sodium chloride (PF)  3 mL Intracatheter Q8H     thiamine  100 mg Oral Daily     I agree with the documentation and findings as written by Cabrera MEDINA and our discussed and formulated assessment and plan. I was present with him who participated in the service and documentation of the note. I have also personally verified the history and personally performed the physical exam and medical decision-making. I agree with the assessment and plan of care as documented in the note.     Rosa Martinez MD  Internal Medicine  Hospitalist  [facility]  Phone: #[phone]

## 2022-10-12 NOTE — UTILIZATION REVIEW
Admission Status; Secondary Review Determination   Under the authority of the Utilization Management Committee, the utilization review process indicated a secondary review on Joan Christianson. The review outcome is based on review of the medical records, discussions with staff, and applying clinical experience noted on the date of the review.   (x) Inpatient Status Appropriate - This patient's medical care is consistent with medical management for inpatient care and reasonable inpatient medical practice.     RATIONALE FOR DETERMINATION   46 year old female with past medical history significant for alcohol abuse, dependence, anxiety, depression, thyroid carcinoma status post thyroidectomy, postsurgical hypothyroidism presented to the emergency room with complaints of recent URI symptoms and cough, admitted with alcohol withdrawal and lactic acidosis, hypokalemia and hypocalcemia , still symptomatic with low Ionized Ca, on CIWA and IV fluid     At the time of admission with the information available to the attending physician more than 2 nights Hospital complex care was anticipated, based on patient risk of adverse outcome if treated as outpatient and complex care required. Inpatient admission is appropriate based on the Medicare guidelines.   The information on this document is developed by the utilization review team in order for the business office to ensure compliance. This only denotes the appropriateness of proper admission status and does not reflect the quality of care rendered.   The definitions of Inpatient Status and Observation Status used in making the determination above are those provided in the CMS Coverage Manual, Chapter 1 and Chapter 6, section 70.4.   Sincerely,   Be Lyman MD  Utilization Review  Physician Advisor  [facility]
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
## 2022-10-12 NOTE — PROVIDER NOTIFICATION
Paged MD regarding: ED Room 14 RENNY.B.- Patient has home Synthroid bottle she wants to use (150 mcg daily) and take this AM. Can you order this for 0600 and state okay to use home med? Thanks so much! [phone]    Addendum: TSH is low. Patient getting too much. Do not reorder at this time. Readdress in AM with temitope [id number]

## 2022-10-12 NOTE — PROVIDER NOTIFICATION
Paged MD regarding: ED Room 14- Lactic Acid back at 3.1. Was 5.3 earlier. Any new orders? patient running Vitamin infusion currently. Thanks! [phone]    Addendum: No new orders at this time. Okay to run IVF after vitamin bag is done since pt. Not hypotensive

## 2022-10-12 NOTE — PLAN OF CARE
Problem: Alcohol Withdrawal  Goal: Alcohol Withdrawal Symptom Control  Outcome: Progressing      Patient boarding down in ED. A&Ox4, flat and withdrawn affect. VSS ex HR and BP; HTN in the 150's and HR tachy. Tele: ST and NSR. On RA sating well. Tylenol given for generalized aches; effective. Infrequent dry cough; PRN cough medicine given. CIWA's on shift: 8, 4 and 3. PRN Valium given; effective. LA 3.1 on shift, MD updated. No new orders. Most recent LA back @ 0.8 from 3.1. PIV x2; one LR @ 125 mL/hr. Other is infusing antibiotics. K and Mag protocols, both WNL on shift; recheck in AM 10/12. Discharge pending.

## 2022-10-12 NOTE — PROVIDER NOTIFICATION
Paged MD regarding: ED Room 14-J.B. Patient with fingers locked. Unable to open up hands well. Please call back so I can explain further. Thanks! [phone]    Addendum: 1x dose Ativan, Tums and Ionized Calcium lab. Okay to hold Zosyn until rounding MD addresses this. Okay to restart LR

## 2022-10-12 NOTE — PLAN OF CARE
Vss other than BP which is elevated in the 140's and HR which has been tachy 100-120's. A&O. Denies pain. Tele maintained and running sinus tach. Remains on Mg (1.8) protocols which are a recheck in the AM. K (3.4) was replaced and lab will redraw at 1830. CIWA scores were 1 this shift r/t slight tremor. PIV running LR at 125 mL/hr.     Pt transferring to . Nurse to nurse handoff completed.

## 2022-10-13 VITALS
SYSTOLIC BLOOD PRESSURE: 131 MMHG | BODY MASS INDEX: 25.24 KG/M2 | HEIGHT: 65 IN | WEIGHT: 151.5 LBS | DIASTOLIC BLOOD PRESSURE: 98 MMHG | OXYGEN SATURATION: 97 % | HEART RATE: 94 BPM | RESPIRATION RATE: 16 BRPM | TEMPERATURE: 98.5 F

## 2022-10-13 LAB
ALBUMIN SERPL-MCNC: 3 G/DL (ref 3.5–5)
ALP SERPL-CCNC: 39 U/L (ref 45–120)
ALT SERPL W P-5'-P-CCNC: 31 U/L (ref 0–45)
ANION GAP SERPL CALCULATED.3IONS-SCNC: 12 MMOL/L (ref 5–18)
AST SERPL W P-5'-P-CCNC: 37 U/L (ref 0–40)
BILIRUB SERPL-MCNC: 1 MG/DL (ref 0–1)
BUN SERPL-MCNC: 4 MG/DL (ref 8–22)
CALCIUM SERPL-MCNC: 8.4 MG/DL (ref 8.5–10.5)
CALCIUM, IONIZED MEASURED: 1.15 MMOL/L (ref 1.11–1.3)
CHLORIDE BLD-SCNC: 102 MMOL/L (ref 98–107)
CO2 SERPL-SCNC: 23 MMOL/L (ref 22–31)
CREAT SERPL-MCNC: 0.76 MG/DL (ref 0.6–1.1)
DEPRECATED CALCIDIOL+CALCIFEROL SERPL-MC: 24 UG/L (ref 20–75)
GFR SERPL CREATININE-BSD FRML MDRD: >90 ML/MIN/1.73M2
GLUCOSE BLD-MCNC: 118 MG/DL (ref 70–125)
HOLD SPECIMEN: NORMAL
ION CA PH 7.4: 1.16 MMOL/L (ref 1.11–1.3)
MAGNESIUM SERPL-MCNC: 1.5 MG/DL (ref 1.8–2.6)
MAGNESIUM SERPL-MCNC: 2.1 MG/DL (ref 1.8–2.6)
PH: 7.41 (ref 7.35–7.45)
POTASSIUM BLD-SCNC: 3.7 MMOL/L (ref 3.5–5)
POTASSIUM BLD-SCNC: 3.7 MMOL/L (ref 3.5–5)
PROT SERPL-MCNC: 6.2 G/DL (ref 6–8)
SODIUM SERPL-SCNC: 137 MMOL/L (ref 136–145)

## 2022-10-13 PROCEDURE — 36415 COLL VENOUS BLD VENIPUNCTURE: CPT | Performed by: INTERNAL MEDICINE

## 2022-10-13 PROCEDURE — 250N000013 HC RX MED GY IP 250 OP 250 PS 637: Performed by: INTERNAL MEDICINE

## 2022-10-13 PROCEDURE — G0008 ADMIN INFLUENZA VIRUS VAC: HCPCS | Performed by: INTERNAL MEDICINE

## 2022-10-13 PROCEDURE — 82306 VITAMIN D 25 HYDROXY: CPT | Performed by: INTERNAL MEDICINE

## 2022-10-13 PROCEDURE — 82330 ASSAY OF CALCIUM: CPT | Performed by: INTERNAL MEDICINE

## 2022-10-13 PROCEDURE — 3E02340 INTRODUCTION OF INFLUENZA VACCINE INTO MUSCLE, PERCUTANEOUS APPROACH: ICD-10-PCS | Performed by: INTERNAL MEDICINE

## 2022-10-13 PROCEDURE — 83735 ASSAY OF MAGNESIUM: CPT | Performed by: INTERNAL MEDICINE

## 2022-10-13 PROCEDURE — 84132 ASSAY OF SERUM POTASSIUM: CPT | Performed by: INTERNAL MEDICINE

## 2022-10-13 PROCEDURE — 250N000011 HC RX IP 250 OP 636: Performed by: INTERNAL MEDICINE

## 2022-10-13 PROCEDURE — 80053 COMPREHEN METABOLIC PANEL: CPT | Performed by: INTERNAL MEDICINE

## 2022-10-13 PROCEDURE — 90686 IIV4 VACC NO PRSV 0.5 ML IM: CPT | Performed by: INTERNAL MEDICINE

## 2022-10-13 PROCEDURE — 99239 HOSP IP/OBS DSCHRG MGMT >30: CPT | Performed by: INTERNAL MEDICINE

## 2022-10-13 PROCEDURE — 250N000013 HC RX MED GY IP 250 OP 250 PS 637: Performed by: HOSPITALIST

## 2022-10-13 RX ORDER — CALCIUM CARBONATE 500 MG/1
1 TABLET, CHEWABLE ORAL 2 TIMES DAILY
Qty: 60 TABLET | Refills: 0 | Status: SHIPPED | OUTPATIENT
Start: 2022-10-13 | End: 2022-11-12

## 2022-10-13 RX ORDER — CALCIUM GLUCONATE 20 MG/ML
2 INJECTION, SOLUTION INTRAVENOUS ONCE
Status: COMPLETED | OUTPATIENT
Start: 2022-10-13 | End: 2022-10-13

## 2022-10-13 RX ORDER — LISINOPRIL 5 MG/1
5 TABLET ORAL DAILY
Qty: 30 TABLET | Refills: 0 | Status: SHIPPED | OUTPATIENT
Start: 2022-10-13 | End: 2022-10-21

## 2022-10-13 RX ORDER — MAGNESIUM SULFATE 4 G/50ML
4 INJECTION INTRAVENOUS ONCE
Status: COMPLETED | OUTPATIENT
Start: 2022-10-13 | End: 2022-10-13

## 2022-10-13 RX ORDER — MAGNESIUM OXIDE 400 MG/1
400 TABLET ORAL DAILY
Qty: 30 TABLET | Refills: 0 | Status: SHIPPED | OUTPATIENT
Start: 2022-10-13

## 2022-10-13 RX ORDER — ACAMPROSATE CALCIUM 333 MG/1
666 TABLET, DELAYED RELEASE ORAL 3 TIMES DAILY
Qty: 180 TABLET | Refills: 0 | Status: SHIPPED | OUTPATIENT
Start: 2022-10-14 | End: 2022-10-21

## 2022-10-13 RX ADMIN — GABAPENTIN 900 MG: 300 CAPSULE ORAL at 08:02

## 2022-10-13 RX ADMIN — CALCIUM CARBONATE (ANTACID) CHEW TAB 500 MG 1000 MG: 500 CHEW TAB at 07:52

## 2022-10-13 RX ADMIN — CLONIDINE HYDROCHLORIDE 0.1 MG: 0.1 TABLET ORAL at 08:01

## 2022-10-13 RX ADMIN — PANTOPRAZOLE SODIUM 40 MG: 20 TABLET, DELAYED RELEASE ORAL at 07:52

## 2022-10-13 RX ADMIN — GABAPENTIN 900 MG: 300 CAPSULE ORAL at 00:06

## 2022-10-13 RX ADMIN — LEVOTHYROXINE SODIUM 150 MCG: 0.15 TABLET ORAL at 07:54

## 2022-10-13 RX ADMIN — Medication 100 MG: at 07:52

## 2022-10-13 RX ADMIN — FOLIC ACID 1 MG: 1 TABLET ORAL at 07:53

## 2022-10-13 RX ADMIN — CLONIDINE HYDROCHLORIDE 0.1 MG: 0.1 TABLET ORAL at 00:05

## 2022-10-13 RX ADMIN — MAGNESIUM SULFATE HEPTAHYDRATE 4 G: 80 INJECTION, SOLUTION INTRAVENOUS at 09:01

## 2022-10-13 RX ADMIN — MULTIPLE VITAMINS W/ MINERALS TAB 1 TABLET: TAB at 07:52

## 2022-10-13 RX ADMIN — CYANOCOBALAMIN TAB 500 MCG 500 MCG: 500 TAB at 08:01

## 2022-10-13 RX ADMIN — INFLUENZA A VIRUS A/VICTORIA/2570/2019 IVR-215 (H1N1) ANTIGEN (FORMALDEHYDE INACTIVATED), INFLUENZA A VIRUS A/DARWIN/9/2021 SAN-010 (H3N2) ANTIGEN (FORMALDEHYDE INACTIVATED), INFLUENZA B VIRUS B/PHUKET/3073/2013 ANTIGEN (FORMALDEHYDE INACTIVATED), AND INFLUENZA B VIRUS B/MICHIGAN/01/2021 ANTIGEN (FORMALDEHYDE INACTIVATED) 0.5 ML: 15; 15; 15; 15 INJECTION, SUSPENSION INTRAMUSCULAR at 14:12

## 2022-10-13 RX ADMIN — GUAIFENESIN 10 ML: 100 SOLUTION ORAL at 09:37

## 2022-10-13 RX ADMIN — CALCIUM GLUCONATE 2 G: 20 INJECTION, SOLUTION INTRAVENOUS at 09:01

## 2022-10-13 RX ADMIN — ACETAMINOPHEN 650 MG: 325 TABLET, FILM COATED ORAL at 04:22

## 2022-10-13 ASSESSMENT — ACTIVITIES OF DAILY LIVING (ADL)
ADLS_ACUITY_SCORE: 20

## 2022-10-13 NOTE — PLAN OF CARE
Problem: Plan of Care - These are the overarching goals to be used throughout the patient stay.    Goal: Readiness for Transition of Care  Outcome: Progressing     Problem: Alcohol Withdrawal  Goal: Alcohol Withdrawal Symptom Control  Outcome: Progressing     VSS. CIWA scores 1&1 due to pt's headache, given tylenol PRN. Tele NSR. LR running at 125mL/hr. K and Mg protocol. Monitoring calcium levels, last was 0.97. Independent in room.
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
## 2022-10-13 NOTE — PLAN OF CARE
Problem: Plan of Care - These are the overarching goals to be used throughout the patient stay.    Goal: Plan of Care Review  Description: The Plan of Care Review/Shift note should be completed every shift.  The Outcome Evaluation is a brief statement about your assessment that the patient is improving, declining, or no change.  This information will be displayed automatically on your shift note.  Outcome: Met   Goal Outcome Evaluation:    Patient scored 0 on CIWA all shift. Replaced Magnesium and Calcium this AM, lab redraws resulted WDL. VSS. Ok'd for discharge by MD.

## 2022-10-13 NOTE — DISCHARGE SUMMARY
[facility] MEDICINE  DISCHARGE SUMMARY     Primary Care Physician: Yamilka Craven  Admission Date: 10/11/2022   Discharge Provider: Rosa Martinez  Discharge Date: 10/13/2022   Diet:   Active Diet and Nourishment Order   Procedures     Room Service     Advance Diet as Tolerated: Regular Diet Adult       Code Status: Full Code   Activity: DCACTIVITY: Activity as tolerated        Condition at Discharge: Stable     REASON FOR PRESENTATION(See Admission Note for Details)   Upper respiratory infection  Alcohol intoxication     PRINCIPAL & ACTIVE DISCHARGE DIAGNOSES     Principle Problem:  Alcohol intoxication  Alcohol withdrawal syndrome  Alcoholic ketoacidosis, lactic acidosis  Hypocalcemia  Hypokalemia  Hypomagnesemia   Upper respiratory infection  Post surgical hypothyroidism      PENDING LABS     Unresulted Labs Ordered in the Past 30 Days of this Admission     Date and Time Order Name Status Description    10/13/2022  8:59 AM Vitamin D Deficiency In process     10/11/2022  3:03 PM Blood Culture Line, venous Preliminary     10/11/2022  3:03 PM Blood Culture Line, venous Preliminary           PROCEDURES ( this hospitalization only)          RECOMMENDATIONS TO OUTPATIENT PROVIDER FOR F/U VISIT             DISPOSITION     Home    SUMMARY OF HOSPITAL COURSE:      Joan Christianson is a 46 year old female with a past medical history of thyroid carcinoma, postsurgical hypothyroidism, alcohol abuse, dependence, anxiety, depression who presented to ED with complaint of recent URI Sxs and new onset cough. Reports nasal congestion, generalized body aches. Covid negative 10/10. Alcohol level of 225.  She was admitted for alcoholic ketoacidosis, lactic acidosis. Treated with supportive care, electrolytes replaced. Hypocalcemia corrected.  She had mild to moderate withdrawal symptoms.  She reported had good resources for alcohol treatment including sponsors, interested in going to .  She was  started on Campral.  She is discharging home in stable condition.    Discharge Medications with Med changes:     Current Discharge Medication List      CONTINUE these medications which have NOT CHANGED    Details   albuterol (PROAIR HFA/PROVENTIL HFA/VENTOLIN HFA) 108 (90 Base) MCG/ACT inhaler Inhale 2 puffs into the lungs every 4 hours as needed for shortness of breath / dyspnea or wheezing      azelastine (OPTIVAR) 0.05 % ophthalmic solution Place 1 drop into both eyes 2 times daily as needed      benzonatate (TESSALON) 100 MG capsule Take 1 capsule (100 mg) by mouth 3 times daily as needed for cough      calcitRIOL (ROCALTROL) 0.5 MCG capsule Take 1 capsule (0.5 mcg) by mouth At Bedtime      fexofenadine (ALLEGRA) 180 MG tablet Take 1 tablet (180 mg) by mouth At Bedtime      gabapentin (NEURONTIN) 300 MG capsule Take 1 capsule (300 mg) by mouth daily      JUNEL 1/20, 21, 1-20 mg-mcg per tablet [JUNEL 1/20, 21, 1-20 MG-MCG PER TABLET] TAKE 1 TABLET BY MOUTH EVERY DAY  Qty: 28 tablet, Refills: 0    Comments: Patient will need an appointment prior to next refill  Associated Diagnoses: Encounter for surveillance of contraceptive pills      levothyroxine (SYNTHROID) 150 MCG tablet Take 1 tablet (150 mcg) by mouth daily  Qty: 90 tablet, Refills: 3    Comments: Must have Synthroid, not generic  Associated Diagnoses: Postoperative hypothyroidism      melatonin 3 MG tablet Take 3 mg by mouth At Bedtime      omeprazole (PRILOSEC) 20 MG capsule [OMEPRAZOLE (PRILOSEC) 20 MG CAPSULE] TAKE 1 CAPSULE BY MOUTH EVERY DAY  Qty: 90 capsule, Refills: 3    Associated Diagnoses: Gastritis      SUMAtriptan (IMITREX) 50 MG tablet [SUMATRIPTAN (IMITREX) 50 MG TABLET] TAKE 1/2 TABLET (25 MG TOTAL) BY MOUTH EVERY 2 HOURS AS NEEDED FOR MIGRAINE.  Qty: 10 tablet, Refills: 2    Associated Diagnoses: Migraine      tacrolimus (PROTOPIC) 0.1 % external ointment 2 times daily as needed      triamcinolone (KENALOG) 0.1 % external ointment Apply  topically 2 times daily as needed for irritation (to hands)      vitamin B-12 (CYANOCOBALAMIN) 500 MCG tablet Take 500 mcg by mouth daily           Start taking these medications  campral 666mg PO TID        Rationale for medication changes:    Campral for alcohol dependence     Consults       CARE MANAGEMENT / SOCIAL WORK IP CONSULT    Immunizations given this encounter     Most Recent Immunizations   Administered Date(s) Administered     COVID-19,PF,Pfizer (12+ Yrs) 09/13/2021     FLU 6-35 months 09/16/2010     HepA, Unspecified 04/06/2010     HepA-Adult 04/06/2010     Influenza (IIV3) PF 10/03/2011     Influenza Intranasal Vaccine 10/08/2012     Influenza Intranasal Vaccine 4 valent (FluMist) 10/06/2014     Influenza Vaccine IM > 6 months Valent IIV4 (Alfuria,Fluzone) 09/13/2021     Influenza Vaccine, 6+MO IM (QUADRIVALENT W/PRESERVATIVES) 09/07/2017     Pneumococcal 23 valent 10/01/2006     Td (Adult), Adsorbed 10/01/2006     Td,adult,historic,unspecified 04/06/2010     Tdap (Adacel,Boostrix) 05/17/2021   Pended Date(s) Pended     Influenza Vaccine IM > 6 months Valent IIV4 (Alfuria,Fluzone) 10/13/2022           Anticoagulation Information        SIGNIFICANT IMAGING FINDINGS     Results for orders placed or performed during the hospital encounter of 10/11/22   Chest XR,  PA & LAT    Impression    IMPRESSION: Negative chest.       SIGNIFICANT LABORATORY FINDINGS     Most Recent 3 CBC's:Recent Labs   Lab Test 10/12/22  0735 10/11/22  1253 10/22/18  1516 09/19/18  1625   WBC 5.2 5.6  --  8.5   HGB 13.0 14.7 12.9 12.5   * 101*  --  95    301  --  244     Most Recent 3 BMP's:Recent Labs   Lab Test 10/13/22  0724 10/12/22  1928 10/12/22  1335 10/12/22  0735 10/11/22  2124 10/11/22  1253     --   --  140  --  136   POTASSIUM 3.7  3.7 3.7 3.4* 3.4*   < > 3.3*   CHLORIDE 102  --   --  103  --  95*   CO2 23  --   --  23  --  17*   BUN 4*  --   --  5*  --  4*   CR 0.76  --   --  0.68  --  0.77    ANIONGAP 12  --   --  14  --  24*   CRAIG 8.4*  --   --  6.6*  --  7.3*     --   --  73  --  98    < > = values in this interval not displayed.     Most Recent 2 LFT's:Recent Labs   Lab Test 10/13/22  0724 10/12/22  0735   AST 37 77*   ALT 31 43   ALKPHOS 39* 44*   BILITOTAL 1.0 1.1*     Calcium, Ionized Measured   Date Value Ref Range Status   10/12/2022 0.97 (L) 1.11 - 1.30 mmol/L Final   10/12/2022 0.81 (LL) 1.11 - 1.30 mmol/L Final          Discharge Orders     No discharge procedures on file.    Examination   Physical Exam   Temp:  [98.2  F (36.8  C)-98.6  F (37  C)] 98.2  F (36.8  C)  Pulse:  [] 94  Resp:  [18-22] 18  BP: (133-161)/() 134/96  SpO2:  [97 %-100 %] 100 %  Wt Readings from Last 1 Encounters:   10/12/22 68.7 kg (151 lb 8 oz)     Please see EMR for more detailed significant labs, imaging, consultant notes etc.    I, Rosa Martinez MD, personally saw the patient today and spent greater than 30 minutes discharging this patient.    Cabrera HARRELL [facility]    CC:Yamilka Craven    I agree with the documentation and findings as written by Cabrera CONSTANTINOS and our discussed and formulated assessment and plan. I was present with him who participated in the service and documentation of the note. I have also personally verified the history and personally performed the physical exam and medical decision-making. I agree with the assessment and plan of care as documented in the note.    Rosa Martinez MD  Internal Medicine  Hospitalist  [facility]  Phone: #[phone]

## 2022-10-13 NOTE — PLAN OF CARE
Joan arrived to the floor around 1745. Elevated BP, systolic into 160s. No c/o pain, nausea or other discomfort. Expressed feelings regarding ETOH abuse, has solid support system with family and AA friends. IV Calcium gluconate, Tums given for Calcium 6.6 (see Results for other Ca++ labs). She is eating and drinking, ambulating independently. Tele reading NS. Voiding, medium continent BM.  visited. LAC IV saline locked, L wrist infusing LR at 125mL/hr. K normalized to 3.7, recheck in AM.      CIWA 2000: no score.    Problem: Alcohol Withdrawal  Goal: Alcohol Withdrawal Symptom Control  Outcome: Progressing  Goal: Optimal Neurologic Function  Outcome: Progressing  Goal: Readiness for Change Identified  Outcome: Progressing     Problem: Electrolyte Imbalance  Goal: Electrolyte Imbalance: Plan of Care  Outcome: Progressing   Goal Outcome Evaluation:

## 2022-10-15 ENCOUNTER — PATIENT OUTREACH (OUTPATIENT)
Dept: CARE COORDINATION | Facility: CLINIC | Age: 47
End: 2022-10-15

## 2022-10-15 NOTE — PROGRESS NOTES
Clinic Care Coordination Contact  [facility]/Voicemail       Clinical Data: Care Coordinator Outreach  Outreach attempted x 2.  Left message on patient's voicemail with call back information and requested return call.  Plan:  Care Coordinator will do no further outreaches at this time.    Elis ELLIOTT Community Health Worker  Clinic Care Coordination  [facility]  Phone: [phone]

## 2022-10-16 LAB
BACTERIA BLD CULT: NO GROWTH
BACTERIA BLD CULT: NO GROWTH

## 2022-10-20 ENCOUNTER — TELEPHONE (OUTPATIENT)
Dept: FAMILY MEDICINE | Facility: CLINIC | Age: 47
End: 2022-10-20

## 2022-10-20 NOTE — TELEPHONE ENCOUNTER
General Call      Reason for Call:  FYI    What are your questions or concerns:    She was recenetly hospitalized. She declined all services with /care coordination. Has appt w PCP 10/21. Flor is available if patient changes her mind.     Flor ESCOBAR- [phone]    Ora Mullen on 10/20/2022 at 9:58 AM         Problem: Delirium, Risk for  Goal: # No symptoms of delirium  Evaluate delirium symptoms under active problem when present   Outcome: Outcome Met, Continue evaluating goal progress toward completion  No s/s of delirium present. Will continue to monitor.     Problem: At Risk for Falls  Goal: # Patient does not fall  Outcome: Outcome Met, Continue evaluating goal progress toward completion  Patient up with supervision. Steady on her feet and no assistive devices needed. Will continue to monitor.

## 2022-10-21 ENCOUNTER — VIRTUAL VISIT (OUTPATIENT)
Dept: FAMILY MEDICINE | Facility: CLINIC | Age: 47
End: 2022-10-21
Payer: COMMERCIAL

## 2022-10-21 VITALS — DIASTOLIC BLOOD PRESSURE: 92 MMHG | WEIGHT: 150 LBS | SYSTOLIC BLOOD PRESSURE: 130 MMHG | BODY MASS INDEX: 24.96 KG/M2

## 2022-10-21 DIAGNOSIS — I10 PRIMARY HYPERTENSION: Primary | ICD-10-CM

## 2022-10-21 DIAGNOSIS — F10.10 ETOH ABUSE: ICD-10-CM

## 2022-10-21 DIAGNOSIS — F10.230 ALCOHOL DEPENDENCE WITH UNCOMPLICATED WITHDRAWAL (H): ICD-10-CM

## 2022-10-21 PROCEDURE — 99495 TRANSJ CARE MGMT MOD F2F 14D: CPT | Mod: GT | Performed by: NURSE PRACTITIONER

## 2022-10-21 RX ORDER — ACAMPROSATE CALCIUM 333 MG/1
666 TABLET, DELAYED RELEASE ORAL 3 TIMES DAILY
Qty: 180 TABLET | Refills: 0 | Status: SHIPPED | OUTPATIENT
Start: 2022-11-11 | End: 2022-12-08

## 2022-10-21 RX ORDER — LISINOPRIL 5 MG/1
5 TABLET ORAL DAILY
Qty: 90 TABLET | Refills: 3 | Status: SHIPPED | OUTPATIENT
Start: 2022-10-21 | End: 2023-04-24

## 2022-10-21 NOTE — PROGRESS NOTES
Joan is a 46 year old who is being evaluated via a billable video visit.      How would you like to obtain your AVS? MyChart  If the video visit is dropped, the invitation should be resent by: Text to cell phone: [phone]  Will anyone else be joining your video visit? No    Assessment & Plan     Primary hypertension    - lisinopril (ZESTRIL) 5 MG tablet  Dispense: 90 tablet; Refill: 3    ETOH abuse      -I reviewed notes in the chart from her recent hospitalization.  I also reviewed lab results and hospitalist recommendation notes.   -We discussed her prior alcohol misuse at this visit today.  It appears that she has set up outpatient chemical dependency, and has been going to AA meetings.  She has a supportive family and spouse.  She stated that she has not drank since the hospital admission, and this admission was a wake-up call for her.  She was able to describe her possible triggers that caused her to drink excessively, and is working on minimizing those triggers, noticing when triggers are happening, and able to use her supportive family and friends for assistance.  She does not really believe that she has chronic depression, she does thinks that it might be more of an adjustment reaction.  She stated that she would like to continue to work on this and will let me know if there is anything I can do to assist.  She declined behavioral health therapy at this time.   -Blood pressure has been checked at home and it is stable within the 1 20-1 30 range.  She can continue lisinopril.  BMP appears stable.   -She is currently still taking medication to help her with her alcohol withdrawal and abuse.  -She will require close monitoring for the next year.  I would like her to follow-up with me within the next 1 to 2 months to ensure that she is doing okay.  I made her aware that she can always send me notes on [employer] if needed.   -             BMI:   Estimated body mass index is 24.96 kg/m  as calculated from the  "following:    Height as of 10/12/22: 1.651 m (5' 5\").    Weight as of this encounter: 68 kg (150 lb).       Regular exercise    Return in about 2 months (around 12/21/2022) for with me.    JYOTHI Feliciano CNP  [facility]    Jim Franco is a 46 year old, presenting for the following health issues:  Consult and Hypertension      medical history of thyroid carcinoma, postsurgical hypothyroidism, alcohol abuse, dependence, anxiety, depression who presented to ED with complaint of recent URI Sxs and new onset cough. Alcohol level of 225.  She was admitted for alcoholic ketoacidosis, lactic acidosis. Treated with supportive care, electrolytes replaced.    She feels she was starting to socially isolate herself and did not go to AA meetings for about two weeks prior to binge. She was away at work function,  was working more evenings, and started to feel more stressed with kids and aloneness.   Does not fully think her drinking was related to depression. She feels it might have been more of an isolated event. She is working through her thoughts and working with AA group members.   Had consult for chemical dependency.     She reports she has good resources for alcohol treatment including sponsors, interested in going to AA.  She was started on Campral and denied any side effects.    -- denied SI or HI. Started to work out again.       Hospital Follow-up Visit:    Hospital/Nursing Home/IP Rehab Facility: [facility]  Date of Admission: 10/11/2022  Date of Discharge: 10/13/2022  Reason(s) for Admission:   Alcohol intoxication  Alcohol withdrawal syndrome  Alcoholic ketoacidosis, lactic acidosis  Hypocalcemia  Hypokalemia  Hypomagnesemia   Upper respiratory infection  Post surgical hypothyroidism       Was your hospitalization related to COVID-19? No   Problems taking medications regularly:  None  Medication changes since discharge: None  Problems " adhering to non-medication therapy:  None    Summary of hospitalization:  [facility] discharge summary reviewed  Diagnostic Tests/Treatments reviewed.  Follow up needed: none  Other Healthcare Providers Involved in Patient s Care:         None  Update since discharge: improved.     MED REC REQUIRED  Post Medication Reconciliation Status:  Discharge medications reconciled, continue medications without change        Plan of care communicated with patient             Review of Systems   CONSTITUTIONAL: NEGATIVE for fever, chills, change in weight  ENT/MOUTH: NEGATIVE for ear, mouth and throat problems  RESP: NEGATIVE for significant cough or SOB  CV: NEGATIVE for chest pain, palpitations or peripheral edema      Objective           Vitals:  No vitals were obtained today due to virtual visit.    Physical Exam   GENERAL: Healthy, alert and no distress  EYES: Eyes grossly normal to inspection.  No discharge or erythema, or obvious scleral/conjunctival abnormalities.  RESP: No audible wheeze, cough, or visible cyanosis.  No visible retractions or increased work of breathing.    SKIN: Visible skin clear. No significant rash, abnormal pigmentation or lesions.  NEURO: Cranial nerves grossly intact.  Mentation and speech appropriate for age.  PSYCH: Mentation appears normal, affect normal/bright, judgement and insight intact, normal speech and appearance well-groomed.    Admission on 10/11/2022, Discharged on 10/13/2022   Component Date Value Ref Range Status     Sodium 10/11/2022 136  136 - 145 mmol/L Final     Potassium 10/11/2022 3.3 (L)  3.5 - 5.0 mmol/L Final     Chloride 10/11/2022 95 (L)  98 - 107 mmol/L Final     Carbon Dioxide (CO2) 10/11/2022 17 (L)  22 - 31 mmol/L Final     Anion Gap 10/11/2022 24 (H)  5 - 18 mmol/L Final     Urea Nitrogen 10/11/2022 4 (L)  8 - 22 mg/dL Final     Creatinine 10/11/2022 0.77  0.60 - 1.10 mg/dL Final     Calcium 10/11/2022 7.3 (L)  8.5 - 10.5 mg/dL Final     Glucose  10/11/2022 98  70 - 125 mg/dL Final     GFR Estimate 10/11/2022 >90  >60 mL/min/1.73m2 Final    Effective December 21, 2021 eGFRcr in adults is calculated using the 2021 CKD-EPI creatinine equation which includes age and gender (Orville et al., [facility], DOI: 10.1056/JMMLnf9542437)     Troponin I 10/11/2022 0.02  0.00 - 0.29 ng/mL Final     WBC Count 10/11/2022 5.6  4.0 - 11.0 10e3/uL Final     RBC Count 10/11/2022 4.35  3.80 - 5.20 10e6/uL Final     Hemoglobin 10/11/2022 14.7  11.7 - 15.7 g/dL Final     Hematocrit 10/11/2022 43.7  35.0 - 47.0 % Final     MCV 10/11/2022 101 (H)  78 - 100 fL Final     MCH 10/11/2022 33.8 (H)  26.5 - 33.0 pg Final     MCHC 10/11/2022 33.6  31.5 - 36.5 g/dL Final     RDW 10/11/2022 12.3  10.0 - 15.0 % Final     Platelet Count 10/11/2022 301  150 - 450 10e3/uL Final     % Neutrophils 10/11/2022 69  % Final     % Lymphocytes 10/11/2022 22  % Final     % Monocytes 10/11/2022 8  % Final     % Eosinophils 10/11/2022 0  % Final     % Basophils 10/11/2022 1  % Final     % Immature Granulocytes 10/11/2022 0  % Final     NRBCs per 100 WBC 10/11/2022 0  <1 /100 Final     Absolute Neutrophils 10/11/2022 3.8  1.6 - 8.3 10e3/uL Final     Absolute Lymphocytes 10/11/2022 1.2  0.8 - 5.3 10e3/uL Final     Absolute Monocytes 10/11/2022 0.4  0.0 - 1.3 10e3/uL Final     Absolute Eosinophils 10/11/2022 0.0  0.0 - 0.7 10e3/uL Final     Absolute Basophils 10/11/2022 0.0  0.0 - 0.2 10e3/uL Final     Absolute Immature Granulocytes 10/11/2022 0.0  <=0.4 10e3/uL Final     Absolute NRBCs 10/11/2022 0.0  10e3/uL Final     Lactic Acid 10/11/2022 6.3 (HH)  0.7 - 2.0 mmol/L Final     TSH 10/11/2022 0.06 (L)  0.30 - 5.00 uIU/mL Final     Ketone (Beta-Hydroxybutyrate) Henri* 10/11/2022 1.76 (H)  <=0.3 mmol/L Final     Alcohol, Blood 10/11/2022 225 (H)  None detected mg/dL Final     Magnesium 10/11/2022 1.7 (L)  1.8 - 2.6 mg/dL Final     Lipase 10/11/2022 34  0 - 52 U/L Final     Bilirubin Total 10/11/2022 0.5  0.0 - 1.0 mg/dL  Final     Bilirubin Direct 10/11/2022 0.2  <=0.5 mg/dL Final     Protein Total 10/11/2022 7.7  6.0 - 8.0 g/dL Final     Albumin 10/11/2022 3.9  3.5 - 5.0 g/dL Final     Alkaline Phosphatase 10/11/2022 49  45 - 120 U/L Final     AST 10/11/2022 94 (H)  0 - 40 U/L Final     ALT 10/11/2022 47 (H)  0 - 45 U/L Final     Ventricular Rate 10/11/2022 110  BPM Final     Atrial Rate 10/11/2022 110  BPM Final     NE Interval 10/11/2022 118  ms Final     QRS Duration 10/11/2022 80  ms Final     QT 10/11/2022 378  ms Final     QTc 10/11/2022 511  ms Final     P Axis 10/11/2022 65  degrees Final     R AXIS 10/11/2022 66  degrees Final     T Axis 10/11/2022 63  degrees Final     Interpretation ECG 10/11/2022    Final                    Value:Sinus tachycardia  Possible Left atrial enlargement  Borderline ECG  When compared with ECG of 19-SEP-2018 16:26,  Vent. rate has increased BY  45 BPM  Nonspecific T wave abnormality now evident in Anterior leads  Confirmed by SEE ED PROVIDER NOTE FOR, ECG INTERPRETATION ([id number]),  CONRADO LEROY ([id number]) on 10/11/2022 2:34:40 PM       Free T4 10/11/2022 1.53  0.90 - 1.70 ng/dL Final     pH Venous 10/11/2022 7.34 (L)  7.35 - 7.45 Final     pCO2 Venous 10/11/2022 34 (L)  35 - 50 mm Hg Final     pO2 Venous 10/11/2022 73 (H)  25 - 47 mm Hg Final     Bicarbonate Venous 10/11/2022 19 (L)  24 - 30 mmol/L Final     Base Excess/Deficit (+/-) 10/11/2022 -7.5    mmol/L Final     Oxyhemoglobin Venous 10/11/2022 90.7 (H)  70.0 - 75.0 % Final     O2 Sat, Venous 10/11/2022 92.6 (H)  70.0 - 75.0 % Final     Color Urine 10/11/2022 Colorless  Colorless, Straw, Light Yellow, Yellow Final     Appearance Urine 10/11/2022 Clear  Clear Final     Glucose Urine 10/11/2022 Negative  Negative mg/dL Final     Bilirubin Urine 10/11/2022 Negative  Negative Final     Ketones Urine 10/11/2022 40 (A)  Negative mg/dL Final     Specific Gravity Urine 10/11/2022 1.007  1.001 - 1.030 Final     Blood Urine 10/11/2022 0.1 mg/dL  (A)  Negative Final     pH Urine 10/11/2022 6.0  5.0 - 7.0 Final     Protein Albumin Urine 10/11/2022 Negative  Negative mg/dL Final     Urobilinogen Urine 10/11/2022 <2.0  <2.0 mg/dL Final     Nitrite Urine 10/11/2022 Negative  Negative Final     Leukocyte Esterase Urine 10/11/2022 Negative  Negative Final     Bacteria Urine 10/11/2022 Few (A)  None Seen /HPF Final     RBC Urine 10/11/2022 2  <=2 /HPF Final     WBC Urine 10/11/2022 1  <=5 /HPF Final     Squamous Epithelials Urine 10/11/2022 <1  <=1 /HPF Final     Culture 10/11/2022 No Growth   Final     Culture 10/11/2022 No Growth   Final     Phosphorus 10/11/2022 3.3  2.5 - 4.5 mg/dL Final     Lactic Acid 10/11/2022 7.6 (HH)  0.7 - 2.0 mmol/L Final     Influenza A antigen 10/11/2022 Negative  Negative Final     Influenza B antigen 10/11/2022 Negative  Negative Final     Phosphorus 10/11/2022 2.8  2.5 - 4.5 mg/dL Final     pH Arterial 10/11/2022 7.41  7.37 - 7.44 Final     pCO2 Arterial 10/11/2022 31 (L)  35 - 45 mm Hg Final     pO2 Arterial 10/11/2022 92 (H)  80 - 90 mm Hg Final     Bicarbonate Arterial 10/11/2022 21 (L)  23 - 29 mmol/L Final     O2 Sat, Arterial 10/11/2022 96.8  96.0 - 97.0 % Final     Oxyhemoglobin 10/11/2022 95.5 (L)  96.0 - 97.0 % Final     Base Excess/Deficit (+/-) 10/11/2022 -4.7    mmol/L Final     FIO2 10/11/2022    Final    RA     Sample Stabilized Temperature 10/11/2022 37.0  degrees C Final     Lactic Acid 10/11/2022 5.3 (HH)  0.7 - 2.0 mmol/L Final     Lactic Acid 10/11/2022 3.1 (H)  0.7 - 2.0 mmol/L Final     Influenza A PCR 10/11/2022 Negative  Negative Final     Influenza B PCR 10/11/2022 Negative  Negative Final     RSV PCR 10/11/2022 Negative  Negative Final     SARS CoV2 PCR 10/11/2022 Negative  Negative Final    NEGATIVE: SARS-CoV-2 (COVID-19) RNA not detected, presumed negative.     INR 10/11/2022 1.09  0.85 - 1.15 Final     Magnesium 10/11/2022 1.8  1.8 - 2.6 mg/dL Final     Potassium 10/11/2022 3.5  3.5 - 5.0 mmol/L Final      Lactic Acid 10/12/2022 0.8  0.7 - 2.0 mmol/L Final     WBC Count 10/12/2022 5.2  4.0 - 11.0 10e3/uL Final     RBC Count 10/12/2022 3.81  3.80 - 5.20 10e6/uL Final     Hemoglobin 10/12/2022 13.0  11.7 - 15.7 g/dL Final     Hematocrit 10/12/2022 38.7  35.0 - 47.0 % Final     MCV 10/12/2022 102 (H)  78 - 100 fL Final     MCH 10/12/2022 34.1 (H)  26.5 - 33.0 pg Final     MCHC 10/12/2022 33.6  31.5 - 36.5 g/dL Final     RDW 10/12/2022 12.2  10.0 - 15.0 % Final     Platelet Count 10/12/2022 219  150 - 450 10e3/uL Final     Sodium 10/12/2022 140  136 - 145 mmol/L Final     Potassium 10/12/2022 3.4 (L)  3.5 - 5.0 mmol/L Final     Chloride 10/12/2022 103  98 - 107 mmol/L Final     Carbon Dioxide (CO2) 10/12/2022 23  22 - 31 mmol/L Final     Anion Gap 10/12/2022 14  5 - 18 mmol/L Final     Urea Nitrogen 10/12/2022 5 (L)  8 - 22 mg/dL Final     Creatinine 10/12/2022 0.68  0.60 - 1.10 mg/dL Final     Calcium 10/12/2022 6.6 (L)  8.5 - 10.5 mg/dL Final     Glucose 10/12/2022 73  70 - 125 mg/dL Final     Alkaline Phosphatase 10/12/2022 44 (L)  45 - 120 U/L Final     AST 10/12/2022 77 (H)  0 - 40 U/L Final     ALT 10/12/2022 43  0 - 45 U/L Final     Protein Total 10/12/2022 6.5  6.0 - 8.0 g/dL Final     Albumin 10/12/2022 3.5  3.5 - 5.0 g/dL Final     Bilirubin Total 10/12/2022 1.1 (H)  0.0 - 1.0 mg/dL Final     GFR Estimate 10/12/2022 >90  >60 mL/min/1.73m2 Final    Effective December 21, 2021 eGFRcr in adults is calculated using the 2021 CKD-EPI creatinine equation which includes age and gender (Orville et al., [facility], DOI: 10.1056/XROExt0558696)     Magnesium 10/12/2022 1.8  1.8 - 2.6 mg/dL Final     Calcium, Ionized pH 7.4 10/12/2022 0.84 (L)  1.11 - 1.30 mmol/L Final     pH 10/12/2022 7.45  7.35 - 7.45 Final     Calcium, Ionized Measured 10/12/2022 0.81 (LL)  1.11 - 1.30 mmol/L Final     Potassium 10/12/2022 3.4 (L)  3.5 - 5.0 mmol/L Final     Potassium 10/12/2022 3.7  3.5 - 5.0 mmol/L Final     Calcium, Ionized pH 7.4  10/12/2022 1.01 (L)  1.11 - 1.30 mmol/L Final     pH 10/12/2022 7.47 (H)  7.35 - 7.45 Final     Calcium, Ionized Measured 10/12/2022 0.97 (L)  1.11 - 1.30 mmol/L Final     Magnesium 10/13/2022 1.5 (L)  1.8 - 2.6 mg/dL Final     Potassium 10/13/2022 3.7  3.5 - 5.0 mmol/L Final     Hold Specimen 10/13/2022 JIC   Final     Sodium 10/13/2022 137  136 - 145 mmol/L Final     Potassium 10/13/2022 3.7  3.5 - 5.0 mmol/L Final     Chloride 10/13/2022 102  98 - 107 mmol/L Final     Carbon Dioxide (CO2) 10/13/2022 23  22 - 31 mmol/L Final     Anion Gap 10/13/2022 12  5 - 18 mmol/L Final     Urea Nitrogen 10/13/2022 4 (L)  8 - 22 mg/dL Final     Creatinine 10/13/2022 0.76  0.60 - 1.10 mg/dL Final     Calcium 10/13/2022 8.4 (L)  8.5 - 10.5 mg/dL Final     Glucose 10/13/2022 118  70 - 125 mg/dL Final     Alkaline Phosphatase 10/13/2022 39 (L)  45 - 120 U/L Final     AST 10/13/2022 37  0 - 40 U/L Final     ALT 10/13/2022 31  0 - 45 U/L Final     Protein Total 10/13/2022 6.2  6.0 - 8.0 g/dL Final     Albumin 10/13/2022 3.0 (L)  3.5 - 5.0 g/dL Final     Bilirubin Total 10/13/2022 1.0  0.0 - 1.0 mg/dL Final     GFR Estimate 10/13/2022 >90  >60 mL/min/1.73m2 Final    Effective December 21, 2021 eGFRcr in adults is calculated using the 2021 CKD-EPI creatinine equation which includes age and gender (Orville kumar al., [facility], DOI: 10.1056/LBPCyx3778828)     Vitamin D, Total (25-Hydroxy) 10/13/2022 24  20 - 75 ug/L Final     Magnesium 10/13/2022 2.1  1.8 - 2.6 mg/dL Final     Calcium, Ionized pH 7.4 10/13/2022 1.16  1.11 - 1.30 mmol/L Final     pH 10/13/2022 7.41  7.35 - 7.45 Final     Calcium, Ionized Measured 10/13/2022 1.15  1.11 - 1.30 mmol/L Final               Video-Visit Details    Video Start Time: 1320    Type of service:  Video Visit    Video End Time:1332    Originating Location (pt. Location): Home        Distant Location (provider location):  On-site    Platform used for Video Visit: Jaret

## 2022-10-28 ENCOUNTER — MYC MEDICAL ADVICE (OUTPATIENT)
Dept: FAMILY MEDICINE | Facility: CLINIC | Age: 47
End: 2022-10-28

## 2022-10-28 DIAGNOSIS — M25.559 ARTHRALGIA OF HIP, UNSPECIFIED LATERALITY: ICD-10-CM

## 2022-10-28 RX ORDER — GABAPENTIN 300 MG/1
300 CAPSULE ORAL 2 TIMES DAILY
Qty: 60 CAPSULE | Refills: 11 | Status: SHIPPED | OUTPATIENT
Start: 2022-10-28 | End: 2023-12-08

## 2022-11-19 ENCOUNTER — HEALTH MAINTENANCE LETTER (OUTPATIENT)
Age: 47
End: 2022-11-19

## 2022-11-28 ENCOUNTER — E-VISIT (OUTPATIENT)
Dept: FAMILY MEDICINE | Facility: CLINIC | Age: 47
End: 2022-11-28
Payer: COMMERCIAL

## 2022-11-28 DIAGNOSIS — F32.A DEPRESSION, UNSPECIFIED DEPRESSION TYPE: ICD-10-CM

## 2022-11-28 DIAGNOSIS — F10.10 ETOH ABUSE: Primary | ICD-10-CM

## 2022-11-28 PROCEDURE — 99422 OL DIG E/M SVC 11-20 MIN: CPT | Performed by: NURSE PRACTITIONER

## 2022-11-28 RX ORDER — SERTRALINE HYDROCHLORIDE 25 MG/1
25 TABLET, FILM COATED ORAL DAILY
Qty: 30 TABLET | Refills: 3 | Status: SHIPPED | OUTPATIENT
Start: 2022-11-28 | End: 2022-12-22

## 2022-11-28 ASSESSMENT — PATIENT HEALTH QUESTIONNAIRE - PHQ9
10. IF YOU CHECKED OFF ANY PROBLEMS, HOW DIFFICULT HAVE THESE PROBLEMS MADE IT FOR YOU TO DO YOUR WORK, TAKE CARE OF THINGS AT HOME, OR GET ALONG WITH OTHER PEOPLE: SOMEWHAT DIFFICULT
SUM OF ALL RESPONSES TO PHQ QUESTIONS 1-9: 7
SUM OF ALL RESPONSES TO PHQ QUESTIONS 1-9: 7

## 2022-11-28 NOTE — TELEPHONE ENCOUNTER
Provider E-Visit time total (minutes): 11-15 minutes, med ordered.     I called patient regarding her behavioral health forms and to follow-up on her history of alcohol abuse.  She stated that she still has not drank any alcohol since her hospital admission.  She is taking medication to prevent alcohol use and she notices some dizziness when taking it.  She is taking it 3 times a day.  She is currently seeing a therapist and is noticing more depression, not really anxiety.  She has tried Prozac in the past.  She would like to try a medication and continue to see her therapist.  She denied any suicidal or homicidal thoughts.  She has plans to see her therapist tomorrow.     I reviewed prior EKG from October and her QT is marginally prolonged.  I reviewed prior notes dating back to 2018 when she was on Prozac.  It appears she was on a very low dose and this related to her thyroid.  I reviewed her behavioral health forms with patient on phone call today.     The plan will be to start Zoloft.  She can start with 1 tablet and increase to 2 tablets within 2 weeks if her symptoms have not improved.  She will continue to see her therapist.  She will inform me if her symptoms are not better within 1 to 2 months.      JYOTHI Feliciano CNP

## 2022-11-29 ASSESSMENT — PATIENT HEALTH QUESTIONNAIRE - PHQ9: SUM OF ALL RESPONSES TO PHQ QUESTIONS 1-9: 7

## 2022-12-05 DIAGNOSIS — F10.230 ALCOHOL DEPENDENCE WITH UNCOMPLICATED WITHDRAWAL (H): ICD-10-CM

## 2022-12-05 NOTE — TELEPHONE ENCOUNTER
Refill Request  Medication name: Pending Prescriptions:                       Disp   Refills    acamprosate (CAMPRAL) 333 MG EC tablet    180 ta*0            Sig: Take 2 tablets (666 mg) by mouth 3 times daily    Who prescribed the medication: Herber  Last refill on medication: 11/9/2022  Requested Pharmacy: [facility]  Last appointment with PCP: 10/21/22  Next appointment: Not due

## 2022-12-08 RX ORDER — ACAMPROSATE CALCIUM 333 MG/1
666 TABLET, DELAYED RELEASE ORAL 3 TIMES DAILY
Qty: 180 TABLET | Refills: 0 | Status: SHIPPED | OUTPATIENT
Start: 2022-12-08 | End: 2023-01-03

## 2022-12-20 DIAGNOSIS — F10.10 ETOH ABUSE: ICD-10-CM

## 2022-12-20 DIAGNOSIS — F32.A DEPRESSION, UNSPECIFIED DEPRESSION TYPE: ICD-10-CM

## 2022-12-21 DIAGNOSIS — F32.A DEPRESSION, UNSPECIFIED DEPRESSION TYPE: ICD-10-CM

## 2022-12-21 DIAGNOSIS — F10.10 ETOH ABUSE: ICD-10-CM

## 2022-12-21 RX ORDER — SERTRALINE HYDROCHLORIDE 25 MG/1
25 TABLET, FILM COATED ORAL DAILY
Qty: 30 TABLET | Refills: 3 | OUTPATIENT
Start: 2022-12-21

## 2022-12-21 NOTE — TELEPHONE ENCOUNTER
Order for 50 mg tab take one tablet daily needed instead of 25 mg order. As insurance will not cover taking two 25 mg a day.   Nurse Triage SBAR    Is this a 2nd Level Triage? YES, LICENSED PRACTITIONER REVIEW IS REQUIRED    Situation: Order needed for 50 mg setraline    Background: Was taking 25 mg and PCP increased to 50 mg if needed    Assessment: Insurance will not cover two 25 mg tablets, need order to read take 50 mg tablet once daily      Recommendation: Please re order as 50 mg tablets daily      Routed to provider      Crystal Tierney RN on 12/21/2022 at 4:48 PM

## 2023-01-02 DIAGNOSIS — F10.230 ALCOHOL DEPENDENCE WITH UNCOMPLICATED WITHDRAWAL (H): ICD-10-CM

## 2023-01-03 RX ORDER — ACAMPROSATE CALCIUM 333 MG/1
666 TABLET, DELAYED RELEASE ORAL 3 TIMES DAILY
Qty: 180 TABLET | Refills: 0 | Status: SHIPPED | OUTPATIENT
Start: 2023-01-03 | End: 2023-02-17

## 2023-01-05 ENCOUNTER — MEDICAL CORRESPONDENCE (OUTPATIENT)
Dept: HEALTH INFORMATION MANAGEMENT | Facility: CLINIC | Age: 48
End: 2023-01-05

## 2023-01-06 DIAGNOSIS — F32.A DEPRESSION, UNSPECIFIED DEPRESSION TYPE: ICD-10-CM

## 2023-01-06 DIAGNOSIS — F10.10 ETOH ABUSE: ICD-10-CM

## 2023-01-08 NOTE — TELEPHONE ENCOUNTER
"Routing refill request to provider for review/approval because:  Labs not current:  PHQ-9    Last Written Prescription Date:  12/22/22  Last Fill Quantity: 90,  # refills: 0   Last office visit provider:   10/21/22    Requested Prescriptions   Pending Prescriptions Disp Refills     sertraline (ZOLOFT) 50 MG tablet 90 tablet 0     Sig: Take 1 tablet (50 mg) by mouth daily       SSRIs Protocol Failed - 1/6/2023 11:02 AM        Failed - PHQ-9 score less than 5 in past 6 months     Please review last PHQ-9 score.           Passed - Medication is active on med list        Passed - Patient is age 18 or older        Passed - No active pregnancy on record        Passed - No positive pregnancy test in last 12 months        Passed - Recent (6 mo) or future (30 days) visit within the authorizing provider's specialty     Patient had office visit in the last 6 months or has a visit in the next 30 days with authorizing provider or within the authorizing provider's specialty.  See \"Patient Info\" tab in inbasket, or \"Choose Columns\" in Meds & Orders section of the refill encounter.                 Carla Simons RN 01/08/23 4:58 PM  "

## 2023-02-16 DIAGNOSIS — F10.230 ALCOHOL DEPENDENCE WITH UNCOMPLICATED WITHDRAWAL (H): ICD-10-CM

## 2023-02-17 RX ORDER — ACAMPROSATE CALCIUM 333 MG/1
666 TABLET, DELAYED RELEASE ORAL 3 TIMES DAILY
Qty: 180 TABLET | Refills: 0 | Status: SHIPPED | OUTPATIENT
Start: 2023-02-17 | End: 2023-03-14

## 2023-02-21 ENCOUNTER — TELEPHONE (OUTPATIENT)
Dept: ENDOCRINOLOGY | Facility: CLINIC | Age: 48
End: 2023-02-21
Payer: COMMERCIAL

## 2023-02-21 DIAGNOSIS — E89.0 POSTOPERATIVE HYPOTHYROIDISM: Primary | ICD-10-CM

## 2023-02-21 DIAGNOSIS — E83.51 HYPOCALCEMIA: Primary | ICD-10-CM

## 2023-02-21 RX ORDER — NORETHINDRONE ACETATE AND ETHINYL ESTRADIOL AND FERROUS FUMARATE 1MG-20(21)
1 KIT ORAL
COMMUNITY
Start: 2022-08-19 | End: 2023-03-02

## 2023-02-21 RX ORDER — MONTELUKAST SODIUM 10 MG/1
1 TABLET ORAL
COMMUNITY
Start: 2022-10-11 | End: 2024-04-23

## 2023-02-21 RX ORDER — CALCITRIOL 0.5 UG/1
0.5 CAPSULE, LIQUID FILLED ORAL DAILY
Qty: 90 CAPSULE | Refills: 3 | Status: SHIPPED | OUTPATIENT
Start: 2023-02-21 | End: 2023-03-02

## 2023-02-21 RX ORDER — CALCITRIOL 0.5 UG/1
1 CAPSULE, LIQUID FILLED ORAL DAILY
COMMUNITY
Start: 2022-08-05 | End: 2023-02-21

## 2023-02-21 NOTE — TELEPHONE ENCOUNTER
The following mychart sent:    Chas Franco,    You have a Endocrinology visit scheduled with Sharron Howard CNP on 3/02/23.  In preparing your chart for your appointment it is noted that your last thyroid labs done last October were not within in normal range.  Please have them rechecked for your upcoming visit.  The lab orders are in your chart.  Please call [phone] to schedule a lab appointment.    Thank you,  Chichi Cevallos MA  Endocrinology Team

## 2023-02-27 ENCOUNTER — LAB (OUTPATIENT)
Dept: LAB | Facility: CLINIC | Age: 48
End: 2023-02-27
Payer: COMMERCIAL

## 2023-02-27 DIAGNOSIS — E89.0 POSTOPERATIVE HYPOTHYROIDISM: ICD-10-CM

## 2023-02-27 LAB
T4 FREE SERPL-MCNC: 1.37 NG/DL (ref 0.9–1.7)
TSH SERPL DL<=0.005 MIU/L-ACNC: 0.01 UIU/ML (ref 0.3–4.2)

## 2023-02-27 PROCEDURE — 84439 ASSAY OF FREE THYROXINE: CPT

## 2023-02-27 PROCEDURE — 84443 ASSAY THYROID STIM HORMONE: CPT

## 2023-02-27 PROCEDURE — 36415 COLL VENOUS BLD VENIPUNCTURE: CPT

## 2023-03-02 ENCOUNTER — OFFICE VISIT (OUTPATIENT)
Dept: ENDOCRINOLOGY | Facility: CLINIC | Age: 48
End: 2023-03-02
Payer: COMMERCIAL

## 2023-03-02 VITALS — BODY MASS INDEX: 22.8 KG/M2 | SYSTOLIC BLOOD PRESSURE: 108 MMHG | DIASTOLIC BLOOD PRESSURE: 80 MMHG | WEIGHT: 137 LBS

## 2023-03-02 DIAGNOSIS — E83.51 HYPOCALCEMIA: ICD-10-CM

## 2023-03-02 DIAGNOSIS — E89.0 POSTOPERATIVE HYPOTHYROIDISM: Primary | ICD-10-CM

## 2023-03-02 LAB — CA-I BLD-MCNC: 4.6 MG/DL (ref 4.4–5.2)

## 2023-03-02 PROCEDURE — 82330 ASSAY OF CALCIUM: CPT | Performed by: NURSE PRACTITIONER

## 2023-03-02 PROCEDURE — 99213 OFFICE O/P EST LOW 20 MIN: CPT | Performed by: NURSE PRACTITIONER

## 2023-03-02 PROCEDURE — 84432 ASSAY OF THYROGLOBULIN: CPT | Mod: 90 | Performed by: NURSE PRACTITIONER

## 2023-03-02 PROCEDURE — 99000 SPECIMEN HANDLING OFFICE-LAB: CPT | Performed by: NURSE PRACTITIONER

## 2023-03-02 PROCEDURE — 36415 COLL VENOUS BLD VENIPUNCTURE: CPT | Performed by: NURSE PRACTITIONER

## 2023-03-02 RX ORDER — CALCITRIOL 0.5 UG/1
0.5 CAPSULE, LIQUID FILLED ORAL DAILY
Qty: 90 CAPSULE | Refills: 3 | Status: SHIPPED | OUTPATIENT
Start: 2023-03-02 | End: 2023-09-14

## 2023-03-02 NOTE — PROGRESS NOTES
"[facility] ENDOCRINOLOGY    Thyroid Note  3/2/2023    Joan Christianson, 1975, [id number]          Reason for visit      1. Postoperative hypothyroidism        HPI     Joan Christianson is a very pleasant 47 year old old female who presents for follow up.  SUMMARY:    Joan is here today in f/u for postoperative hypothyroidism. She is reporting today that she is \"finally feeling good\".  She has been consistently taking 150 mcg of Levothyroxine daily on an empty stomach. Current TSH is 0.01 and fT4 is 1.37. She has been taking Calcitriol for low calcium levels up until just recently, when she ran out. Two of her four Parathyroid glands were removed with her Thyroid. We do not have a current Calcium level.      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)     Papillary thyroid carcinoma (H)     Postoperative hypothyroidism     Ketosis (H)       Family History       family history includes Breast Cancer (age of onset: 39.00) in her cousin; Breast Cancer (age of onset: 50.00) in her mother; Cancer in her paternal aunt and other family members; Coronary Artery Disease (age of onset: 70.00) in her paternal grandfather; Lung Cancer (age of onset: 55.00) in her mother; Melanoma in her maternal aunt and maternal grandfather; Pulmonary Embolism (age of onset: 60.00) in her father.    Social History      reports that she quit smoking about 6 years ago. Her smoking use included cigarettes. She started smoking about 26 years ago. She has never used smokeless tobacco. She reports that she does not drink alcohol and does not use drugs.      Review of Systems     Patient denies fatigue, weight changes, heat/cold intolerance, bowel/skin changes or CVS symptoms.   Remainder per HPI and per " attached intake form.    Answers for HPI/ROS submitted by the patient on 2/25/2023  General Symptoms: No  Skin Symptoms: No  HENT Symptoms: No  EYE SYMPTOMS: No  HEART SYMPTOMS: No  LUNG SYMPTOMS: No  INTESTINAL SYMPTOMS: No  URINARY SYMPTOMS: No  GYNECOLOGIC SYMPTOMS: No  BREAST SYMPTOMS: No  SKELETAL SYMPTOMS: No  BLOOD SYMPTOMS: No  NERVOUS SYSTEM SYMPTOMS: No  MENTAL HEALTH SYMPTOMS: No      Vital Signs     /80 (BP Location: Right arm, Patient Position: Sitting, Cuff Size: Adult Regular)   Wt 62.1 kg (137 lb)   BMI 22.80 kg/m    Wt Readings from Last 3 Encounters:   03/02/23 62.1 kg (137 lb)   10/21/22 68 kg (150 lb)   10/12/22 68.7 kg (151 lb 8 oz)       Physical Exam     General:  Normal, NIRD,appears euthyroid  Eyes:  Pupils equal, round and reactive to light; no proptosis, lid lag or  periorbital edema.  Thyroid:  Thyroid is absent.  No tenderness  Neck: No lymph nodes  Musculoskeletal:  Muscle strength grossly normal without evidence of wasting.  Heart:  Regular rate and rhythm without murmur.  Lungs:  Clear to auscultation.  Abdomen: Soft, non-tender, no masses or organomegaly  Neuro: Patella Reflexes were normal.No tremors  Skin:  No acanthosis nigricans or vitiligo          Assessment     1. Postoperative hypothyroidism            Plan       She is bio -chemically and physically euthyroid. She will remain on her current medication regimen.  Will get an US done of the Thyroid bed today because of the lymph node involvement with her Cancer. She will f/u with me in 6 months.       Sharron Howard NP   Endocrinology  3/2/2023  8:03 AM      Lab Results     TSH   Date Value Ref Range Status   02/27/2023 0.01 (L) 0.30 - 4.20 uIU/mL Final   10/11/2022 0.06 (L) 0.30 - 5.00 uIU/mL Final     No components found for: THYROIDAB    No results found for: Q2LPLLB    Imaging Results   Last thyroid ultrasound:  No results found for this or any previous visit.      Last thyroid nuclear scan:  No results found for  this or any previous visit.      Current Medications     Outpatient Medications Prior to Visit   Medication Sig Dispense Refill     acamprosate (CAMPRAL) 333 MG EC tablet Take 2 tablets (666 mg) by mouth 3 times daily 180 tablet 0     albuterol (PROAIR HFA/PROVENTIL HFA/VENTOLIN HFA) 108 (90 Base) MCG/ACT inhaler Inhale 2 puffs into the lungs every 4 hours as needed for shortness of breath / dyspnea or wheezing       azelastine (OPTIVAR) 0.05 % ophthalmic solution Place 1 drop into both eyes 2 times daily as needed       benzonatate (TESSALON) 100 MG capsule Take 1 capsule (100 mg) by mouth 3 times daily as needed for cough       calcitRIOL (ROCALTROL) 0.5 MCG capsule Take 1 capsule (0.5 mcg) by mouth daily 90 capsule 3     fexofenadine (ALLEGRA) 180 MG tablet Take 1 tablet (180 mg) by mouth At Bedtime       gabapentin (NEURONTIN) 300 MG capsule Take 1 capsule (300 mg) by mouth 2 times daily 60 capsule 11     levothyroxine (SYNTHROID) 150 MCG tablet Take 1 tablet (150 mcg) by mouth daily 90 tablet 3     lisinopril (ZESTRIL) 5 MG tablet Take 1 tablet (5 mg) by mouth daily 90 tablet 3     magnesium oxide (MAG-OX) 400 MG tablet Take 1 tablet (400 mg) by mouth daily 30 tablet 0     melatonin 3 MG tablet Take 3 mg by mouth At Bedtime       montelukast (SINGULAIR) 10 MG tablet Take 1 tablet by mouth daily at 2 pm       sertraline (ZOLOFT) 50 MG tablet Take 1 tablet (50 mg) by mouth daily 90 tablet 0     SUMAtriptan (IMITREX) 50 MG tablet [SUMATRIPTAN (IMITREX) 50 MG TABLET] TAKE 1/2 TABLET (25 MG TOTAL) BY MOUTH EVERY 2 HOURS AS NEEDED FOR MIGRAINE. 10 tablet 2     tacrolimus (PROTOPIC) 0.1 % external ointment 2 times daily as needed       triamcinolone (KENALOG) 0.1 % external ointment Apply topically 2 times daily as needed for irritation (to hands)       vitamin B-12 (CYANOCOBALAMIN) 500 MCG tablet Take 500 mcg by mouth daily       calcitRIOL (ROCALTROL) 0.5 MCG capsule Take 1 capsule (0.5 mcg) by mouth At Bedtime        JUNEL FE 1/20 1-20 MG-MCG tablet Take 1 tablet by mouth daily at 2 pm       No facility-administered medications prior to visit.

## 2023-03-02 NOTE — LETTER
"    3/2/2023         RE: Joan Christianson  [address] [address]        Dear Colleague,    Thank you for referring your patient, Joan Christianson, to the [facility]. Please see a copy of my visit note below.    [facility] ENDOCRINOLOGY    Thyroid Note  3/2/2023    Joan Christianson, 1975, [id number]          Reason for visit      1. Postoperative hypothyroidism        HPI     Joan Christianson is a very pleasant 47 year old old female who presents for follow up.  SUMMARY:    Joan is here today in f/u for postoperative hypothyroidism. She is reporting today that she is \"finally feeling good\".  She has been consistently taking 150 mcg of Levothyroxine daily on an empty stomach. Current TSH is 0.01 and fT4 is 1.37. She has been taking Calcitriol for low calcium levels up until just recently, when she ran out. Two of her four Parathyroid glands were removed with her Thyroid. We do not have a current Calcium level.      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)     Papillary thyroid carcinoma (H)     Postoperative hypothyroidism     Ketosis (H)       Family History       family history includes Breast Cancer (age of onset: 39.00) in her cousin; Breast Cancer (age of onset: 50.00) in her mother; Cancer in her paternal aunt and other family members; Coronary Artery Disease (age of onset: 70.00) in her paternal grandfather; Lung Cancer (age of onset: 55.00) in her mother; Melanoma in her maternal aunt and maternal grandfather; Pulmonary Embolism (age of onset: 60.00) in her father.    Social History      reports that she quit smoking about 6 years ago. Her smoking use included cigarettes. She started smoking about 26 years ago. She " has never used smokeless tobacco. She reports that she does not drink alcohol and does not use drugs.      Review of Systems     Patient denies fatigue, weight changes, heat/cold intolerance, bowel/skin changes or CVS symptoms.   Remainder per HPI and per attached intake form.    Answers for HPI/ROS submitted by the patient on 2/25/2023  General Symptoms: No  Skin Symptoms: No  HENT Symptoms: No  EYE SYMPTOMS: No  HEART SYMPTOMS: No  LUNG SYMPTOMS: No  INTESTINAL SYMPTOMS: No  URINARY SYMPTOMS: No  GYNECOLOGIC SYMPTOMS: No  BREAST SYMPTOMS: No  SKELETAL SYMPTOMS: No  BLOOD SYMPTOMS: No  NERVOUS SYSTEM SYMPTOMS: No  MENTAL HEALTH SYMPTOMS: No      Vital Signs     /80 (BP Location: Right arm, Patient Position: Sitting, Cuff Size: Adult Regular)   Wt 62.1 kg (137 lb)   BMI 22.80 kg/m    Wt Readings from Last 3 Encounters:   03/02/23 62.1 kg (137 lb)   10/21/22 68 kg (150 lb)   10/12/22 68.7 kg (151 lb 8 oz)       Physical Exam     General:  Normal, NIRD,appears euthyroid  Eyes:  Pupils equal, round and reactive to light; no proptosis, lid lag or  periorbital edema.  Thyroid:  Thyroid is absent.  No tenderness  Neck: No lymph nodes  Musculoskeletal:  Muscle strength grossly normal without evidence of wasting.  Heart:  Regular rate and rhythm without murmur.  Lungs:  Clear to auscultation.  Abdomen: Soft, non-tender, no masses or organomegaly  Neuro: Patella Reflexes were normal.No tremors  Skin:  No acanthosis nigricans or vitiligo          Assessment     1. Postoperative hypothyroidism            Plan       She is bio -chemically and physically euthyroid. She will remain on her current medication regimen.  Will get an US done of the Thyroid bed today because of the lymph node involvement with her Cancer. She will f/u with me in 6 months.       Sharron Howard NP  [facility] Endocrinology  3/2/2023  8:03 AM      Lab Results     TSH   Date Value Ref Range Status   02/27/2023 0.01 (L) 0.30 - 4.20 uIU/mL Final   10/11/2022  0.06 (L) 0.30 - 5.00 uIU/mL Final     No components found for: THYROIDAB    No results found for: [id number]    Imaging Results   Last thyroid ultrasound:  No results found for this or any previous visit.      Last thyroid nuclear scan:  No results found for this or any previous visit.      Current Medications     Outpatient Medications Prior to Visit   Medication Sig Dispense Refill     acamprosate (CAMPRAL) 333 MG EC tablet Take 2 tablets (666 mg) by mouth 3 times daily 180 tablet 0     albuterol (PROAIR HFA/PROVENTIL HFA/VENTOLIN HFA) 108 (90 Base) MCG/ACT inhaler Inhale 2 puffs into the lungs every 4 hours as needed for shortness of breath / dyspnea or wheezing       azelastine (OPTIVAR) 0.05 % ophthalmic solution Place 1 drop into both eyes 2 times daily as needed       benzonatate (TESSALON) 100 MG capsule Take 1 capsule (100 mg) by mouth 3 times daily as needed for cough       calcitRIOL (ROCALTROL) 0.5 MCG capsule Take 1 capsule (0.5 mcg) by mouth daily 90 capsule 3     fexofenadine (ALLEGRA) 180 MG tablet Take 1 tablet (180 mg) by mouth At Bedtime       gabapentin (NEURONTIN) 300 MG capsule Take 1 capsule (300 mg) by mouth 2 times daily 60 capsule 11     levothyroxine (SYNTHROID) 150 MCG tablet Take 1 tablet (150 mcg) by mouth daily 90 tablet 3     lisinopril (ZESTRIL) 5 MG tablet Take 1 tablet (5 mg) by mouth daily 90 tablet 3     magnesium oxide (MAG-OX) 400 MG tablet Take 1 tablet (400 mg) by mouth daily 30 tablet 0     melatonin 3 MG tablet Take 3 mg by mouth At Bedtime       montelukast (SINGULAIR) 10 MG tablet Take 1 tablet by mouth daily at 2 pm       sertraline (ZOLOFT) 50 MG tablet Take 1 tablet (50 mg) by mouth daily 90 tablet 0     SUMAtriptan (IMITREX) 50 MG tablet [SUMATRIPTAN (IMITREX) 50 MG TABLET] TAKE 1/2 TABLET (25 MG TOTAL) BY MOUTH EVERY 2 HOURS AS NEEDED FOR MIGRAINE. 10 tablet 2     tacrolimus (PROTOPIC) 0.1 % external ointment 2 times daily as needed       triamcinolone (KENALOG) 0.1 %  external ointment Apply topically 2 times daily as needed for irritation (to hands)       vitamin B-12 (CYANOCOBALAMIN) 500 MCG tablet Take 500 mcg by mouth daily       calcitRIOL (ROCALTROL) 0.5 MCG capsule Take 1 capsule (0.5 mcg) by mouth At Bedtime       JUNEL FE 1/20 1-20 MG-MCG tablet Take 1 tablet by mouth daily at 2 pm       No facility-administered medications prior to visit.                 Again, thank you for allowing me to participate in the care of your patient.        Sincerely,        Sharron Howard NP

## 2023-03-08 LAB — THYROGLOB SERPL-MCNC: 0.5 NG/ML

## 2023-03-13 DIAGNOSIS — F10.230 ALCOHOL DEPENDENCE WITH UNCOMPLICATED WITHDRAWAL (H): ICD-10-CM

## 2023-03-14 RX ORDER — ACAMPROSATE CALCIUM 333 MG/1
666 TABLET, DELAYED RELEASE ORAL 3 TIMES DAILY
Qty: 180 TABLET | Refills: 0 | Status: SHIPPED | OUTPATIENT
Start: 2023-03-14 | End: 2023-04-06

## 2023-03-21 ENCOUNTER — HOSPITAL ENCOUNTER (OUTPATIENT)
Dept: ULTRASOUND IMAGING | Facility: CLINIC | Age: 48
Discharge: HOME | End: 2023-03-21
Attending: NURSE PRACTITIONER | Admitting: NURSE PRACTITIONER
Payer: COMMERCIAL

## 2023-03-21 ENCOUNTER — TELEPHONE (OUTPATIENT)
Dept: ENDOCRINOLOGY | Facility: CLINIC | Age: 48
End: 2023-03-21
Payer: COMMERCIAL

## 2023-03-21 DIAGNOSIS — C73 PAPILLARY THYROID CARCINOMA (H): Primary | ICD-10-CM

## 2023-03-21 DIAGNOSIS — E89.0 POSTOPERATIVE HYPOTHYROIDISM: ICD-10-CM

## 2023-03-21 LAB — RADIOLOGIST FLAGS: ABNORMAL

## 2023-03-21 PROCEDURE — 76536 US EXAM OF HEAD AND NECK: CPT

## 2023-03-21 NOTE — TELEPHONE ENCOUNTER
Radiology calling to inform me of an urgent finding on the thyroid ultrasound;  IMPRESSION:  1.  One, small right cervical node is unchanged in size with a suggestion of a cystic component inferiorly as noted above, unchanged since last year but suggestion of a change in texture compared to the 2021 exam.  2.  It is unclear if this node has been sampled or not? Consider FNA if it has not.  3.  No new suspicious nodes.    FNA recommended if this has not been performed, as the node has changed in texture.

## 2023-03-21 NOTE — TELEPHONE ENCOUNTER
Pt called and spoken with regarding US findings. Verbalized understanding and agreed to FNA. Orders were placed.  Pt will call and schedule.

## 2023-03-24 DIAGNOSIS — F32.A DEPRESSION, UNSPECIFIED DEPRESSION TYPE: ICD-10-CM

## 2023-03-24 DIAGNOSIS — F10.10 ETOH ABUSE: ICD-10-CM

## 2023-03-25 NOTE — TELEPHONE ENCOUNTER
"Routing refill request to provider for review/approval because:  PHQ-9 score of 7 on 11/28/22    Last Written Prescription Date:  12/22/2022  Last Fill Quantity: 90,  # refills: 0   Last office visit provider:  10/21/2022     Requested Prescriptions   Pending Prescriptions Disp Refills     sertraline (ZOLOFT) 50 MG tablet [Pharmacy Med Name: SERTRALINE HCL 50 MG TABLET] 90 tablet 0     Sig: TAKE 1 TABLET BY MOUTH EVERY DAY       SSRIs Protocol Failed - 3/25/2023  7:32 AM        Failed - PHQ-9 score less than 5 in past 6 months     Please review last PHQ-9 score.           Passed - Medication is active on med list        Passed - Patient is age 18 or older        Passed - No active pregnancy on record        Passed - No positive pregnancy test in last 12 months        Passed - Recent (6 mo) or future (30 days) visit within the authorizing provider's specialty     Patient had office visit in the last 6 months or has a visit in the next 30 days with authorizing provider or within the authorizing provider's specialty.  See \"Patient Info\" tab in inbasket, or \"Choose Columns\" in Meds & Orders section of the refill encounter.                 Dee Brennan RN 03/25/23 7:32 AM  "

## 2023-03-26 ENCOUNTER — MYC MEDICAL ADVICE (OUTPATIENT)
Dept: FAMILY MEDICINE | Facility: CLINIC | Age: 48
End: 2023-03-26
Payer: COMMERCIAL

## 2023-03-31 ENCOUNTER — HOSPITAL ENCOUNTER (OUTPATIENT)
Dept: ULTRASOUND IMAGING | Facility: CLINIC | Age: 48
Discharge: HOME | End: 2023-03-31
Attending: NURSE PRACTITIONER | Admitting: PATHOLOGY
Payer: COMMERCIAL

## 2023-03-31 DIAGNOSIS — C73 PAPILLARY THYROID CARCINOMA (H): ICD-10-CM

## 2023-03-31 PROCEDURE — 88172 CYTP DX EVAL FNA 1ST EA SITE: CPT | Mod: 26 | Performed by: PATHOLOGY

## 2023-03-31 PROCEDURE — 88173 CYTOPATH EVAL FNA REPORT: CPT | Mod: 26 | Performed by: PATHOLOGY

## 2023-03-31 PROCEDURE — 88305 TISSUE EXAM BY PATHOLOGIST: CPT | Mod: 26 | Performed by: PATHOLOGY

## 2023-03-31 PROCEDURE — 272N000710 US BIOPSY FINE NEEDLE ASPIRATION LYMPH NODE

## 2023-03-31 PROCEDURE — 88173 CYTOPATH EVAL FNA REPORT: CPT | Mod: TC | Performed by: NURSE PRACTITIONER

## 2023-04-04 LAB
PATH REPORT.COMMENTS IMP SPEC: ABNORMAL
PATH REPORT.COMMENTS IMP SPEC: YES
PATH REPORT.FINAL DX SPEC: ABNORMAL
PATH REPORT.GROSS SPEC: ABNORMAL
PATH REPORT.MICROSCOPIC SPEC OTHER STN: ABNORMAL
PATH REPORT.RELEVANT HX SPEC: ABNORMAL

## 2023-04-06 DIAGNOSIS — F10.230 ALCOHOL DEPENDENCE WITH UNCOMPLICATED WITHDRAWAL (H): ICD-10-CM

## 2023-04-06 RX ORDER — ACAMPROSATE CALCIUM 333 MG/1
TABLET, DELAYED RELEASE ORAL
Qty: 180 TABLET | Refills: 0 | Status: SHIPPED | OUTPATIENT
Start: 2023-04-06 | End: 2023-05-01

## 2023-04-24 ENCOUNTER — OFFICE VISIT (OUTPATIENT)
Dept: FAMILY MEDICINE | Facility: CLINIC | Age: 48
End: 2023-04-24
Payer: COMMERCIAL

## 2023-04-24 VITALS
BODY MASS INDEX: 23.56 KG/M2 | SYSTOLIC BLOOD PRESSURE: 123 MMHG | HEART RATE: 69 BPM | RESPIRATION RATE: 12 BRPM | WEIGHT: 138 LBS | OXYGEN SATURATION: 100 % | TEMPERATURE: 97.9 F | HEIGHT: 64 IN | DIASTOLIC BLOOD PRESSURE: 81 MMHG

## 2023-04-24 DIAGNOSIS — E89.0 POSTOPERATIVE HYPOTHYROIDISM: ICD-10-CM

## 2023-04-24 DIAGNOSIS — L70.0 ACNE VULGARIS: ICD-10-CM

## 2023-04-24 DIAGNOSIS — Z85.850 HISTORY OF THYROID CANCER: ICD-10-CM

## 2023-04-24 DIAGNOSIS — Z76.0 ENCOUNTER FOR MEDICATION REFILL: ICD-10-CM

## 2023-04-24 DIAGNOSIS — Z85.850 H/O MALIGNANT NEOPLASM OF THYROID: ICD-10-CM

## 2023-04-24 DIAGNOSIS — F33.1 MODERATE EPISODE OF RECURRENT MAJOR DEPRESSIVE DISORDER (H): ICD-10-CM

## 2023-04-24 DIAGNOSIS — R89.7 ABNORMAL BIOPSY RESULT: ICD-10-CM

## 2023-04-24 DIAGNOSIS — Z00.00 VISIT FOR PREVENTIVE HEALTH EXAMINATION: Primary | ICD-10-CM

## 2023-04-24 DIAGNOSIS — F10.21 ALCOHOL DEPENDENCE IN REMISSION (H): ICD-10-CM

## 2023-04-24 PROBLEM — F10.230 ALCOHOL DEPENDENCE WITH UNCOMPLICATED WITHDRAWAL (H): Status: ACTIVE | Noted: 2023-04-24

## 2023-04-24 PROBLEM — F32.5 MAJOR DEPRESSIVE DISORDER WITH SINGLE EPISODE, IN FULL REMISSION (H): Status: ACTIVE | Noted: 2023-04-24

## 2023-04-24 PROBLEM — C77.0 MALIGNANT NEOPLASM METASTATIC TO LYMPH NODE OF NECK (H): Status: RESOLVED | Noted: 2023-04-24 | Resolved: 2023-04-24

## 2023-04-24 PROBLEM — C77.0 MALIGNANT NEOPLASM METASTATIC TO LYMPH NODE OF NECK (H): Status: ACTIVE | Noted: 2023-04-24

## 2023-04-24 PROCEDURE — 99213 OFFICE O/P EST LOW 20 MIN: CPT | Mod: 25 | Performed by: NURSE PRACTITIONER

## 2023-04-24 PROCEDURE — 99396 PREV VISIT EST AGE 40-64: CPT | Performed by: NURSE PRACTITIONER

## 2023-04-24 RX ORDER — DOXYCYCLINE 100 MG/1
100 TABLET ORAL DAILY
Qty: 90 TABLET | Refills: 3 | Status: SHIPPED | OUTPATIENT
Start: 2023-04-24 | End: 2024-05-02

## 2023-04-24 ASSESSMENT — PATIENT HEALTH QUESTIONNAIRE - PHQ9
SUM OF ALL RESPONSES TO PHQ QUESTIONS 1-9: 7
SUM OF ALL RESPONSES TO PHQ QUESTIONS 1-9: 7
10. IF YOU CHECKED OFF ANY PROBLEMS, HOW DIFFICULT HAVE THESE PROBLEMS MADE IT FOR YOU TO DO YOUR WORK, TAKE CARE OF THINGS AT HOME, OR GET ALONG WITH OTHER PEOPLE: SOMEWHAT DIFFICULT

## 2023-04-24 ASSESSMENT — ENCOUNTER SYMPTOMS
ARTHRALGIAS: 1
HEARTBURN: 0
FREQUENCY: 0
SORE THROAT: 0
NERVOUS/ANXIOUS: 0
HEMATOCHEZIA: 0
CONSTIPATION: 0
PARESTHESIAS: 0
WEAKNESS: 0
HEADACHES: 0
MYALGIAS: 1
ABDOMINAL PAIN: 0
HEMATURIA: 0
NAUSEA: 0
PALPITATIONS: 0
JOINT SWELLING: 0
EYE PAIN: 0
CHILLS: 0
DIZZINESS: 0
BREAST MASS: 0
DIARRHEA: 0
DYSURIA: 0
FEVER: 0
COUGH: 0
SHORTNESS OF BREATH: 0

## 2023-04-24 ASSESSMENT — ANXIETY QUESTIONNAIRES
GAD7 TOTAL SCORE: 0
GAD7 TOTAL SCORE: 0
3. WORRYING TOO MUCH ABOUT DIFFERENT THINGS: NOT AT ALL
4. TROUBLE RELAXING: NOT AT ALL
8. IF YOU CHECKED OFF ANY PROBLEMS, HOW DIFFICULT HAVE THESE MADE IT FOR YOU TO DO YOUR WORK, TAKE CARE OF THINGS AT HOME, OR GET ALONG WITH OTHER PEOPLE?: SOMEWHAT DIFFICULT
5. BEING SO RESTLESS THAT IT IS HARD TO SIT STILL: NOT AT ALL
7. FEELING AFRAID AS IF SOMETHING AWFUL MIGHT HAPPEN: NOT AT ALL
2. NOT BEING ABLE TO STOP OR CONTROL WORRYING: NOT AT ALL
7. FEELING AFRAID AS IF SOMETHING AWFUL MIGHT HAPPEN: NOT AT ALL
6. BECOMING EASILY ANNOYED OR IRRITABLE: NOT AT ALL
GAD7 TOTAL SCORE: 0
IF YOU CHECKED OFF ANY PROBLEMS ON THIS QUESTIONNAIRE, HOW DIFFICULT HAVE THESE PROBLEMS MADE IT FOR YOU TO DO YOUR WORK, TAKE CARE OF THINGS AT HOME, OR GET ALONG WITH OTHER PEOPLE: SOMEWHAT DIFFICULT
1. FEELING NERVOUS, ANXIOUS, OR ON EDGE: NOT AT ALL

## 2023-04-24 NOTE — PROGRESS NOTES
SUBJECTIVE:   CC: Joan is an 47 year old who presents for preventive health visit.   -She stated that she has been feeling overall well.  She stated that sometimes she feels blah and not sure what the reason is.  She has not drinking any alcohol for at least 6 months.  She stated that she does not feel depressed or overly anxious.  She stated that she has a supportive  and 2 teenagers.  She exercises on a regular basis, and eats a healthy diet.  She has been taking gabapentin originally for anxiety, and then continue the medication for nerve pain in her neck after her thyroid was removed.  The nerve pain has resolved.  She feels that the Zoloft has been effective in keeping her mood stable.  She stated that she gets hormonal acne and has been on doxycycline for many years.  She will take a daily dose of doxycycline, and then increase to 2 tablets if her acne becomes worse with her menstrual cycle.   -She had a colonoscopy done last year and it showed a polyp.  She will be in 5 years.   -Mammogram is due in August.  She denied any new breast changes.          View : No data to display.              Patient has been advised of split billing requirements and indicates understanding: Yes  Healthy Habits:     Getting at least 3 servings of Calcium per day:  Yes    Bi-annual eye exam:  NO    Dental care twice a year:  Yes    Sleep apnea or symptoms of sleep apnea:  None    Diet:  Regular (no restrictions)    Frequency of exercise:  6-7 days/week    Duration of exercise:  45-60 minutes    Taking medications regularly:  Yes    Medication side effects:  None    PHQ-2 Total Score: 3    Additional concerns today:  No      Today's PHQ-2 Score:       4/24/2023    10:56 AM   PHQ-2 ( 1999 [employer])   Q1: Little interest or pleasure in doing things 2   Q2: Feeling down, depressed or hopeless 1   PHQ-2 Score 3   Q1: Little interest or pleasure in doing things More than half the days   Q2: Feeling down, depressed or  hopeless Several days   PHQ-2 Score 3      PATIENT HEALTH QUESTIONNAIRE-9 (PHQ - 9)    Over the last 2 weeks, how often have you been bothered by any of the following problems?    1. Little interest or pleasure in doing things -  More than half the days   2. Feeling down, depressed, or hopeless -  Not at all   3. Trouble falling or staying asleep, or sleeping too much - Several days   4. Feeling tired or having little energy -  More than half the days   5. Poor appetite or overeating -  Not at all   6. Feeling bad about yourself - or that you are a failure or have let yourself or your family down -  Several days   7. Trouble concentrating on things, such as reading the newspaper or watching television - Several days   8. Moving or speaking so slowly that other people could have noticed? Or the opposite - being so fidgety or restless that you have been moving around a lot more than usual Not at all   9. Thoughts that you would be better off dead or of hurting  yourself in some way Not at all   Total Score: 7     If you checked off any problems, how difficult have these problems made it for you to do your work, take care of things at home, or get along with other people?      Developed by Fatuma Koch, Kim Larson, Waqas Hurst and colleagues, with an educational merari from [employer] No permission required to reproduce, translate, display or distribute. permission required to reproduce, translate, display or distribute.      Have you ever done Advance Care Planning? (For example, a Health Directive, POLST, or a discussion with a medical provider or your loved ones about your wishes): No, advance care planning information given to patient to review.  Patient declined advance care planning discussion at this time.    Social History     Tobacco Use     Smoking status: Former     Types: Cigarettes     Start date: 1996     Quit date: 2017     Years since quittin.2     Smokeless tobacco:  Never     Tobacco comments:     1-2 cigarettes per day   Vaping Use     Vaping status: Not on file   Substance Use Topics     Alcohol use: No     Comment: Previous alcohol use. Sober now for 6-months (4-2023)             4/24/2023    10:56 AM   Alcohol Use   Prescreen: >3 drinks/day or >7 drinks/week? No     Reviewed orders with patient.  Reviewed health maintenance and updated orders accordingly - Yes  Lab work is in process  Labs reviewed in EPIC    Breast Cancer Screening:    FHS-7:       4/24/2023    10:57 AM   Breast CA Risk Assessment (FHS-7)   Did any of your first-degree relatives have breast or ovarian cancer? Yes   Did any of your relatives have bilateral breast cancer? Yes   Did any man in your family have breast cancer? No   Did any woman in your family have breast and ovarian cancer? Yes   Did any woman in your family have breast cancer before age 50 y? Yes   Do you have 2 or more relatives with breast and/or ovarian cancer? Yes   Do you have 2 or more relatives with breast and/or bowel cancer? Yes       Mammogram Screening: Recommended annual mammography  Pertinent mammograms are reviewed under the imaging tab.    History of abnormal Pap smear: NO - age 30-65 PAP every 5 years with negative HPV co-testing recommended      Latest Ref Rng & Units 2/27/2018     2:23 PM   PAP / HPV   PAP  Negative for squamous intraepithelial lesion or malignancy  Electronically signed by Filemon Higuera CT (ASCP) on 3/5/2018 at  3:10 PM       HPV 16 DNA NEG Negative     HPV 18 DNA NEG Negative     Other HR HPV NEG Positive       Reviewed and updated as needed this visit by clinical staff   Tobacco  Allergies  Meds              Reviewed and updated as needed this visit by Provider                 Past Medical History:   Diagnosis Date     Acute pyelonephritis      Anxiety 4/19/2018     Major depressive disorder       Past Surgical History:   Procedure Laterality Date     BIOPSY BREAST Left 03/2012     HC REMOVAL  "OF TONSILS,<13 Y/O      Description: Tonsillectomy;  Recorded: 07/29/2009;       Review of Systems   Constitutional: Negative for chills and fever.   HENT: Negative for congestion, ear pain, hearing loss and sore throat.    Eyes: Negative for pain and visual disturbance.   Respiratory: Negative for cough and shortness of breath.    Cardiovascular: Negative for chest pain, palpitations and peripheral edema.   Gastrointestinal: Negative for abdominal pain, constipation, diarrhea, heartburn, hematochezia and nausea.   Breasts:  Negative for tenderness, breast mass and discharge.   Genitourinary: Negative for dysuria, frequency, genital sores, hematuria, pelvic pain, urgency, vaginal bleeding and vaginal discharge.   Musculoskeletal: Positive for arthralgias and myalgias. Negative for joint swelling.   Skin: Negative for rash.   Neurological: Negative for dizziness, weakness, headaches and paresthesias.   Psychiatric/Behavioral: Negative for mood changes. The patient is not nervous/anxious.        OBJECTIVE:   /81   Pulse 69   Temp 97.9  F (36.6  C) (Oral)   Resp 12   Ht 1.626 m (5' 4\")   Wt 62.6 kg (138 lb)   LMP 04/10/2023 (Exact Date)   SpO2 100%   BMI 23.69 kg/m    Physical Exam  GENERAL: healthy, alert and no distress  EYES: Eyes grossly normal to inspection, PERRL and conjunctivae and sclerae normal  HENT: ear canals and TM's normal, nose and mouth without ulcers or lesions  NECK: no adenopathy, no asymmetry, masses, or scars and thyroid normal to palpation  RESP: lungs clear to auscultation - no rales, rhonchi or wheezes  BREAST: normal without masses, tenderness or nipple discharge and no palpable axillary masses or adenopathy  CV: regular rate and rhythm, normal S1 S2, no S3 or S4, no murmur, click or rub, no peripheral edema and peripheral pulses strong  ABDOMEN: soft, nontender, no hepatosplenomegaly, no masses and bowel sounds normal  MS: no gross musculoskeletal defects noted, no edema  SKIN: " no suspicious lesions or rashes  NEURO: Normal strength and tone, mentation intact and speech normal  PSYCH: mentation appears normal, affect normal/bright    Diagnostic Test Results:  Labs reviewed in Epic    ASSESSMENT/PLAN:       ICD-10-CM    1. Visit for preventive health examination  Z00.00       2. Postoperative hypothyroidism  E89.0       3. History of thyroid cancer  Z85.850       4. Acne vulgaris  L70.0 doxycycline monohydrate (ADOXA) 100 MG tablet      5. H/O malignant neoplasm of thyroid  Z85.850       6. Alcohol dependence in remission (H)  F10.21       7. Moderate episode of recurrent major depressive disorder (H)  F33.1       8. Abnormal biopsy result  R89.7       9. Encounter for medication refill  Z76.0 doxycycline monohydrate (ADOXA) 100 MG tablet        -I praised her for not drinking any alcohol for at least 6 months.  It appears that she is stable and this arena.   -She has been feeling blah, I suggested that she go down on her gabapentin to just once a day/at bedtime and assess if this is helpful.  If it is helpful that she can continue the gabapentin just once a day.  Her nerve pain has resolved.  Zoloft will help with depression and anxiety.  She will continue to work on a regular basis.   -She has been following up with endocrinology related to her thyroid and hypocalcemia.  She stated that she is still waiting for the biopsy results to return.  She is aware to contact her endocrinologist if she does not hear back within the next couple weeks.  I reviewed the biopsy results of atypical cells.  And I read over endocrinology notes with the patient today.   -She showed me pictures of her worsening acne.  Doxycycline refilled.       Patient has been advised of split billing requirements and indicates understanding: Yes      COUNSELING:  Reviewed preventive health counseling, as reflected in patient instructions      BMI:   Estimated body mass index is 22.8 kg/m  as calculated from the  "following:    Height as of 10/12/22: 1.651 m (5' 5\").    Weight as of 3/2/23: 62.1 kg (137 lb).         She reports that she quit smoking about 6 years ago. Her smoking use included cigarettes. She started smoking about 26 years ago. She has never used smokeless tobacco.          JYOTHI Feliciano CNP  [facility]  Answers for HPI/ROS submitted by the patient on 4/24/2023  If you checked off any problems, how difficult have these problems made it for you to do your work, take care of things at home, or get along with other people?: Somewhat difficult  PHQ9 TOTAL SCORE: 7  SADI 7 TOTAL SCORE: 0      "

## 2023-05-01 DIAGNOSIS — F10.230 ALCOHOL DEPENDENCE WITH UNCOMPLICATED WITHDRAWAL (H): ICD-10-CM

## 2023-05-02 RX ORDER — ACAMPROSATE CALCIUM 333 MG/1
666 TABLET, DELAYED RELEASE ORAL 3 TIMES DAILY
Qty: 180 TABLET | Refills: 0 | Status: SHIPPED | OUTPATIENT
Start: 2023-05-02 | End: 2023-05-26

## 2023-05-02 NOTE — TELEPHONE ENCOUNTER
Routing refill request to provider for review/approval because:  Drug not on the [facility] refill protocol     Last Written Prescription Date:  4/6/2023  Last Fill Quantity: 180,  # refills: 0   Last office visit provider:  4/24/2023     Requested Prescriptions   Pending Prescriptions Disp Refills     acamprosate (CAMPRAL) 333 MG EC tablet 180 tablet 0     Sig: Take 2 tablets (666 mg) by mouth 3 times daily       There is no refill protocol information for this order          Yamilka Colmenares RN 05/01/23 11:38 PM

## 2023-05-26 DIAGNOSIS — F10.230 ALCOHOL DEPENDENCE WITH UNCOMPLICATED WITHDRAWAL (H): ICD-10-CM

## 2023-05-26 RX ORDER — ACAMPROSATE CALCIUM 333 MG/1
666 TABLET, DELAYED RELEASE ORAL 3 TIMES DAILY
Qty: 180 TABLET | Refills: 0 | Status: SHIPPED | OUTPATIENT
Start: 2023-05-26 | End: 2023-06-22

## 2023-06-21 DIAGNOSIS — F10.230 ALCOHOL DEPENDENCE WITH UNCOMPLICATED WITHDRAWAL (H): ICD-10-CM

## 2023-06-22 RX ORDER — ACAMPROSATE CALCIUM 333 MG/1
666 TABLET, DELAYED RELEASE ORAL 3 TIMES DAILY
Qty: 180 TABLET | Refills: 0 | Status: SHIPPED | OUTPATIENT
Start: 2023-06-22 | End: 2023-07-14

## 2023-06-22 NOTE — TELEPHONE ENCOUNTER
Routing refill request to provider for review/approval because:  Drug not on the [facility] refill protocol     Last Written Prescription Date: 5/26/2023  Last Fill Quantity: 100,  # refills: 0   Last office visit provider: 4/24/2023 with PCP RENNY Craven CNP    Requested Prescriptions   Pending Prescriptions Disp Refills     acamprosate (CAMPRAL) 333 MG EC tablet 180 tablet 0     Sig: Take 2 tablets (666 mg) by mouth 3 times daily       There is no refill protocol information for this order          Patrizia Sepulveda RN 06/21/23 8:42 PM

## 2023-07-03 ENCOUNTER — PATIENT OUTREACH (OUTPATIENT)
Dept: CARE COORDINATION | Facility: CLINIC | Age: 48
End: 2023-07-03
Payer: COMMERCIAL

## 2023-07-14 DIAGNOSIS — F10.230 ALCOHOL DEPENDENCE WITH UNCOMPLICATED WITHDRAWAL (H): ICD-10-CM

## 2023-07-14 RX ORDER — ACAMPROSATE CALCIUM 333 MG/1
TABLET, DELAYED RELEASE ORAL
Qty: 180 TABLET | Refills: 0 | Status: SHIPPED | OUTPATIENT
Start: 2023-07-14 | End: 2023-08-10

## 2023-07-31 ENCOUNTER — PATIENT OUTREACH (OUTPATIENT)
Dept: CARE COORDINATION | Facility: CLINIC | Age: 48
End: 2023-07-31
Payer: COMMERCIAL

## 2023-08-03 ENCOUNTER — HOSPITAL ENCOUNTER (OUTPATIENT)
Dept: MAMMOGRAPHY | Facility: CLINIC | Age: 48
Discharge: HOME | End: 2023-08-03
Attending: NURSE PRACTITIONER | Admitting: NURSE PRACTITIONER
Payer: COMMERCIAL

## 2023-08-03 DIAGNOSIS — Z12.31 VISIT FOR SCREENING MAMMOGRAM: ICD-10-CM

## 2023-08-03 PROCEDURE — 77067 SCR MAMMO BI INCL CAD: CPT

## 2023-08-07 DIAGNOSIS — E89.0 POSTOPERATIVE HYPOTHYROIDISM: ICD-10-CM

## 2023-08-07 RX ORDER — LEVOTHYROXINE SODIUM 150 MCG
150 TABLET ORAL DAILY
Qty: 45 TABLET | Refills: 0 | Status: SHIPPED | OUTPATIENT
Start: 2023-08-07 | End: 2023-09-14

## 2023-08-07 NOTE — TELEPHONE ENCOUNTER
"Requested Prescriptions   Pending Prescriptions Disp Refills    SYNTHROID 150 MCG tablet [Pharmacy Med Name: SYNTHROID 150 MCG TABLET] 90 tablet 1     Sig: TAKE 1 TABLET BY MOUTH EVERY DAY       Thyroid Protocol Failed - 8/7/2023 12:54 AM        Failed - Normal TSH on file in past 12 months     Recent Labs   Lab Test 02/27/23  0816   TSH 0.01*              Passed - Patient is 12 years or older        Passed - Recent (12 mo) or future (30 days) visit within the authorizing provider's specialty     Patient has had an office visit with the authorizing provider or a provider within the authorizing providers department within the previous 12 mos or has a future within next 30 days. See \"Patient Info\" tab in inbasket, or \"Choose Columns\" in Meds & Orders section of the refill encounter.              Passed - Medication is active on med list        Passed - No active pregnancy on record     If patient is pregnant or has had a positive pregnancy test, please check TSH.          Passed - No positive pregnancy test in past 12 months     If patient is pregnant or has had a positive pregnancy test, please check TSH.               "

## 2023-08-08 ENCOUNTER — HOSPITAL ENCOUNTER (OUTPATIENT)
Dept: MAMMOGRAPHY | Facility: CLINIC | Age: 48
Discharge: HOME | End: 2023-08-08
Attending: NURSE PRACTITIONER
Payer: COMMERCIAL

## 2023-08-08 DIAGNOSIS — N64.89 BREAST ASYMMETRY: ICD-10-CM

## 2023-08-08 PROCEDURE — 76642 ULTRASOUND BREAST LIMITED: CPT | Mod: LT

## 2023-08-08 PROCEDURE — 77061 BREAST TOMOSYNTHESIS UNI: CPT | Mod: LT

## 2023-08-10 DIAGNOSIS — F10.230 ALCOHOL DEPENDENCE WITH UNCOMPLICATED WITHDRAWAL (H): ICD-10-CM

## 2023-08-10 NOTE — TELEPHONE ENCOUNTER
Routing refill request to provider for review/approval because:  Drug not on the [facility] refill protocol     Last Written Prescription Date:  7/14/23  Last Fill Quantity: 180,  # refills: 0   Last office visit provider:  4/24/23     Requested Prescriptions   Pending Prescriptions Disp Refills    acamprosate (CAMPRAL) 333 MG EC tablet 180 tablet 0     Sig: Take 2 tablets (666 mg) by mouth 3 times daily       There is no refill protocol information for this order          Linda Sawant RN 08/10/23 2:26 PM

## 2023-08-11 RX ORDER — ACAMPROSATE CALCIUM 333 MG/1
666 TABLET, DELAYED RELEASE ORAL 3 TIMES DAILY
Qty: 180 TABLET | Refills: 0 | Status: SHIPPED | OUTPATIENT
Start: 2023-08-11 | End: 2023-09-05

## 2023-09-05 ENCOUNTER — LAB (OUTPATIENT)
Dept: LAB | Facility: CLINIC | Age: 48
End: 2023-09-05
Payer: COMMERCIAL

## 2023-09-05 DIAGNOSIS — F10.230 ALCOHOL DEPENDENCE WITH UNCOMPLICATED WITHDRAWAL (H): ICD-10-CM

## 2023-09-05 DIAGNOSIS — E89.0 POSTOPERATIVE HYPOTHYROIDISM: ICD-10-CM

## 2023-09-05 LAB
T4 FREE SERPL-MCNC: 1.78 NG/DL (ref 0.9–1.7)
TSH SERPL DL<=0.005 MIU/L-ACNC: 0.01 UIU/ML (ref 0.3–4.2)

## 2023-09-05 PROCEDURE — 84443 ASSAY THYROID STIM HORMONE: CPT

## 2023-09-05 PROCEDURE — 99000 SPECIMEN HANDLING OFFICE-LAB: CPT

## 2023-09-05 PROCEDURE — 84439 ASSAY OF FREE THYROXINE: CPT

## 2023-09-05 PROCEDURE — 84432 ASSAY OF THYROGLOBULIN: CPT | Mod: 90

## 2023-09-05 PROCEDURE — 36415 COLL VENOUS BLD VENIPUNCTURE: CPT

## 2023-09-05 RX ORDER — ACAMPROSATE CALCIUM 333 MG/1
666 TABLET, DELAYED RELEASE ORAL 3 TIMES DAILY
Qty: 180 TABLET | Refills: 0 | Status: SHIPPED | OUTPATIENT
Start: 2023-09-05 | End: 2023-12-08

## 2023-09-09 LAB — THYROGLOB SERPL-MCNC: <0.5 NG/ML

## 2023-09-14 ENCOUNTER — OFFICE VISIT (OUTPATIENT)
Dept: ENDOCRINOLOGY | Facility: CLINIC | Age: 48
End: 2023-09-14
Payer: COMMERCIAL

## 2023-09-14 VITALS
WEIGHT: 137.5 LBS | HEART RATE: 61 BPM | BODY MASS INDEX: 23.6 KG/M2 | DIASTOLIC BLOOD PRESSURE: 66 MMHG | OXYGEN SATURATION: 98 % | SYSTOLIC BLOOD PRESSURE: 104 MMHG

## 2023-09-14 DIAGNOSIS — E89.0 POSTOPERATIVE HYPOTHYROIDISM: ICD-10-CM

## 2023-09-14 DIAGNOSIS — E83.51 HYPOCALCEMIA: ICD-10-CM

## 2023-09-14 PROCEDURE — 99214 OFFICE O/P EST MOD 30 MIN: CPT | Performed by: NURSE PRACTITIONER

## 2023-09-14 RX ORDER — LEVOTHYROXINE SODIUM 150 MCG
150 TABLET ORAL DAILY
Qty: 45 TABLET | OUTPATIENT
Start: 2023-09-14

## 2023-09-14 RX ORDER — LEVOTHYROXINE SODIUM 150 MCG
150 TABLET ORAL DAILY
Qty: 90 TABLET | Refills: 3 | Status: SHIPPED | OUTPATIENT
Start: 2023-09-14 | End: 2024-09-13

## 2023-09-14 RX ORDER — CALCITRIOL 0.5 UG/1
0.5 CAPSULE, LIQUID FILLED ORAL DAILY
Qty: 90 CAPSULE | Refills: 3 | Status: SHIPPED | OUTPATIENT
Start: 2023-09-14 | End: 2024-09-13

## 2023-09-14 NOTE — PROGRESS NOTES
"[facility] ENDOCRINOLOGY    Thyroid Note  9/14/2023    Joan Christianson, 1975, [id number]          Reason for visit      1. Postoperative hypothyroidism    2. Hypocalcemia        HPI     Joan Christianson is a very pleasant 47 year old old female who presents for follow up.  SUMMARY:    Joan is here today in f/u for postoperative hypothyroidism. She is reporting today again, that she is \" feeling good\". She has been consistently taking 150 mcg of Levothyroxine daily on an empty stomach. Current TSH is 0.01 and fT4 is 1.78.  Thyroglobulin by LC-MS/MS was <0.05 as expected.  She is having no problems referable to her neck at present.     Ionized Calcium was within normal range at 4.6. She remains on Calcitriol, 0.5 mcg daily.         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)    Papillary thyroid carcinoma (H)    Postoperative hypothyroidism    Ketosis (H)    Alcohol dependence with uncomplicated withdrawal (H)    Major depressive disorder with single episode, in full remission (H)    Acne vulgaris       Family History       family history includes Breast Cancer (age of onset: 39.00) in her cousin; Breast Cancer (age of onset: 50.00) in her mother; Cancer in her paternal aunt and other family members; Coronary Artery Disease (age of onset: 70.00) in her paternal grandfather; Lung Cancer (age of onset: 55.00) in her mother; Melanoma in her maternal aunt and maternal grandfather; Pulmonary Embolism (age of onset: 60.00) in her father.    Social History      reports that she quit smoking about 6 years ago. Her smoking use included cigarettes. She started smoking about 27 years ago. She has never used smokeless tobacco. She reports that she does not drink alcohol and does not use " drugs.      Review of Systems     Patient denies fatigue, weight changes, heat/cold intolerance, bowel/skin changes or CVS symptoms.   Remainder per HPI and per attached intake form.      Vital Signs     /66 (BP Location: Right arm, Patient Position: Sitting, Cuff Size: Adult Regular)   Pulse 61   Wt 62.4 kg (137 lb 8 oz)   SpO2 98%   BMI 23.60 kg/m    Wt Readings from Last 3 Encounters:   09/14/23 62.4 kg (137 lb 8 oz)   04/24/23 62.6 kg (138 lb)   03/02/23 62.1 kg (137 lb)       Physical Exam     General:  Normal, NIRD,appears euthyroid  Eyes:  Pupils equal, round and reactive to light; no proptosis, lid lag or  periorbital edema.  Thyroid:  thyroid is absent.    Neck: No lymph nodes  Musculoskeletal:  Muscle strength grossly normal without evidence of wasting.  Heart:  Regular rate and rhythm without murmur.  Lungs:  Clear to auscultation.  Abdomen: Soft, non-tender, no masses or organomegaly  Neuro: Patella Reflexes were normal.No tremors  Skin:  No acanthosis nigricans or vitiligo        Assessment     1. Postoperative hypothyroidism    2. Hypocalcemia            Plan     Pt is bio-chemically euthyroid. We will continue with the current dose of Levothyroxine and will follow-up together in 1 year.    Pt remains stable on the Calcitriol, and will maintain current dose.         Sharron Howard NP  [facility]  9/14/2023  7:26 AM          This note has been dictated using voice recognition software.  Any grammatical or context distortions are unintentional and inherent to the software.     Lab Results     TSH   Date Value Ref Range Status   09/05/2023 0.01 (L) 0.30 - 4.20 uIU/mL Final   10/11/2022 0.06 (L) 0.30 - 5.00 uIU/mL Final     No components found for: THYROIDAB    No results found for: L0MDEQS    Imaging Results   Last thyroid ultrasound:  Results for orders placed during the hospital encounter of 03/21/23    US Thyroid    Narrative  EXAM: US THYROID  LOCATION: [facility]  HOSPITAL  DATE/TIME: 3/21/2023 7:23 AM    INDICATION: Thyroidectomy for cancer in 2018 with recurrence in 2020. Surveillance.  COMPARISON: 03/18/2022, 03/02/2021  TECHNIQUE: Thyroid ultrasound.    FINDINGS: Study done only by the technologist.    RIGHT lobe: Absent.  LEFT lobe: Absent.    NECK: A superior right cervical lymph node is again noted. This measures 11 x 4 x 3 mm, unchanged in size since 2021. The inferior component on the cine loops appears cystic, also unchanged since last year but a change in appearance since 2021.    Impression  IMPRESSION:  1.  One, small right cervical node is unchanged in size with a suggestion of a cystic component inferiorly as noted above, unchanged since last year but suggestion of a change in texture compared to the 2021 exam.  2.  It is unclear if this node has been sampled or not? Consider FNA if it has not.  3.  No new suspicious nodes.      Nodules are characterized per  ACR Thyroid Imaging, Reporting and Data System (TI-RADS): White Paper of the ACR TI-RADS Committee  Alec Ulloa et al. Journal of the American College of Radiology 2017. Volume 14 (2017), Issue 5, 587-176.      [Access Center: Please see above report regarding right superior cervical node.]    This report will be copied to the [facility] to ensure a provider acknowledges the finding. Access Center is available Monday through Friday 8am-3:30 pm.      Last thyroid nuclear scan:  No results found for this or any previous visit.      Current Medications     Outpatient Medications Prior to Visit   Medication Sig Dispense Refill    acamprosate (CAMPRAL) 333 MG EC tablet TAKE 2 TABLETS BY MOUTH 3 TIMES A  tablet 0    azelastine (OPTIVAR) 0.05 % ophthalmic solution Place 1 drop into both eyes 2 times daily as needed      doxycycline monohydrate (ADOXA) 100 MG tablet Take 1 tablet (100 mg) by mouth daily 90 tablet 3    fexofenadine (ALLEGRA) 180 MG tablet Take 1 tablet (180 mg) by mouth  At Bedtime      gabapentin (NEURONTIN) 300 MG capsule Take 1 capsule (300 mg) by mouth 2 times daily 60 capsule 11    magnesium oxide (MAG-OX) 400 MG tablet Take 1 tablet (400 mg) by mouth daily 30 tablet 0    melatonin 3 MG tablet Take 3 mg by mouth At Bedtime      montelukast (SINGULAIR) 10 MG tablet Take 1 tablet by mouth daily at 2 pm      sertraline (ZOLOFT) 50 MG tablet Take 1 tablet (50 mg) by mouth daily 90 tablet 3    SUMAtriptan (IMITREX) 50 MG tablet [SUMATRIPTAN (IMITREX) 50 MG TABLET] TAKE 1/2 TABLET (25 MG TOTAL) BY MOUTH EVERY 2 HOURS AS NEEDED FOR MIGRAINE. 10 tablet 2    triamcinolone (KENALOG) 0.1 % external ointment Apply topically 2 times daily as needed for irritation (to hands)      vitamin B-12 (CYANOCOBALAMIN) 500 MCG tablet Take 500 mcg by mouth daily      albuterol (PROAIR HFA/PROVENTIL HFA/VENTOLIN HFA) 108 (90 Base) MCG/ACT inhaler Inhale 2 puffs into the lungs every 4 hours as needed for shortness of breath / dyspnea or wheezing (Patient not taking: Reported on 9/14/2023)      calcitRIOL (ROCALTROL) 0.5 MCG capsule Take 1 capsule (0.5 mcg) by mouth daily 90 capsule 3    SYNTHROID 150 MCG tablet TAKE 1 TABLET BY MOUTH EVERY DAY 45 tablet 0     No facility-administered medications prior to visit.       Answers submitted by the patient for this visit:  Symptoms you have experienced in the last 30 days (Submitted on 9/14/2023)  General Symptoms: No  Skin Symptoms: No  HENT Symptoms: No  EYE SYMPTOMS: No  HEART SYMPTOMS: No  LUNG SYMPTOMS: No  INTESTINAL SYMPTOMS: No  URINARY SYMPTOMS: No  GYNECOLOGIC SYMPTOMS: No  BREAST SYMPTOMS: No  SKELETAL SYMPTOMS: No  BLOOD SYMPTOMS: No  NERVOUS SYSTEM SYMPTOMS: No  MENTAL HEALTH SYMPTOMS: No

## 2023-09-14 NOTE — LETTER
"    9/14/2023         RE: Joan Christianson  [address] [facility] [id number]        Dear Colleague,    Thank you for referring your patient, Jona Christianson, to the [facility]. Please see a copy of my visit note below.    [facility] ENDOCRINOLOGY    Thyroid Note  9/14/2023    Joan Christianson, 1975, [id number]          Reason for visit      1. Postoperative hypothyroidism    2. Hypocalcemia        HPI     Joan Christianson is a very pleasant 47 year old old female who presents for follow up.  SUMMARY:    Joan is here today in f/u for postoperative hypothyroidism. She is reporting today again, that she is \" feeling good\". She has been consistently taking 150 mcg of Levothyroxine daily on an empty stomach. Current TSH is 0.01 and fT4 is 1.78.  Thyroglobulin by LC-MS/MS was <0.05 as expected.  She is having no problems referable to her neck at present.     Ionized Calcium was within normal range at 4.6. She remains on Calcitriol, 0.5 mcg daily.         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)     Papillary thyroid carcinoma (H)     Postoperative hypothyroidism     Ketosis (H)     Alcohol dependence with uncomplicated withdrawal (H)     Major depressive disorder with single episode, in full remission (H)     Acne vulgaris       Family History       family history includes Breast Cancer (age of onset: 39.00) in her cousin; Breast Cancer (age of onset: 50.00) in her mother; Cancer in her paternal aunt and other family members; Coronary Artery Disease (age of onset: 70.00) in her paternal grandfather; Lung Cancer (age of onset: 55.00) in her mother; Melanoma in her maternal aunt and maternal grandfather; Pulmonary Embolism (age of onset: " 60.00) in her father.    Social History      reports that she quit smoking about 6 years ago. Her smoking use included cigarettes. She started smoking about 27 years ago. She has never used smokeless tobacco. She reports that she does not drink alcohol and does not use drugs.      Review of Systems     Patient denies fatigue, weight changes, heat/cold intolerance, bowel/skin changes or CVS symptoms.   Remainder per HPI and per attached intake form.      Vital Signs     /66 (BP Location: Right arm, Patient Position: Sitting, Cuff Size: Adult Regular)   Pulse 61   Wt 62.4 kg (137 lb 8 oz)   SpO2 98%   BMI 23.60 kg/m    Wt Readings from Last 3 Encounters:   09/14/23 62.4 kg (137 lb 8 oz)   04/24/23 62.6 kg (138 lb)   03/02/23 62.1 kg (137 lb)       Physical Exam     General:  Normal, NIRD,appears euthyroid  Eyes:  Pupils equal, round and reactive to light; no proptosis, lid lag or  periorbital edema.  Thyroid:  thyroid is absent.    Neck: No lymph nodes  Musculoskeletal:  Muscle strength grossly normal without evidence of wasting.  Heart:  Regular rate and rhythm without murmur.  Lungs:  Clear to auscultation.  Abdomen: Soft, non-tender, no masses or organomegaly  Neuro: Patella Reflexes were normal.No tremors  Skin:  No acanthosis nigricans or vitiligo        Assessment     1. Postoperative hypothyroidism    2. Hypocalcemia            Plan     Pt is bio-chemically euthyroid. We will continue with the current dose of Levothyroxine and will follow-up together in 1 year.    Pt remains stable on the Calcitriol, and will maintain current dose.         Sharron Howard NP   Endocrinology  9/14/2023  7:26 AM          This note has been dictated using voice recognition software.  Any grammatical or context distortions are unintentional and inherent to the software.     Lab Results     TSH   Date Value Ref Range Status   09/05/2023 0.01 (L) 0.30 - 4.20 uIU/mL Final   10/11/2022 0.06 (L) 0.30 - 5.00 uIU/mL Final      No components found for: THYROIDAB    No results found for: S8PFFLN    Imaging Results   Last thyroid ultrasound:  Results for orders placed during the hospital encounter of 03/21/23    US Thyroid    Narrative  EXAM: US THYROID  LOCATION: [facility]  DATE/TIME: 3/21/2023 7:23 AM    INDICATION: Thyroidectomy for cancer in 2018 with recurrence in 2020. Surveillance.  COMPARISON: 03/18/2022, 03/02/2021  TECHNIQUE: Thyroid ultrasound.    FINDINGS: Study done only by the technologist.    RIGHT lobe: Absent.  LEFT lobe: Absent.    NECK: A superior right cervical lymph node is again noted. This measures 11 x 4 x 3 mm, unchanged in size since 2021. The inferior component on the cine loops appears cystic, also unchanged since last year but a change in appearance since 2021.    Impression  IMPRESSION:  1.  One, small right cervical node is unchanged in size with a suggestion of a cystic component inferiorly as noted above, unchanged since last year but suggestion of a change in texture compared to the 2021 exam.  2.  It is unclear if this node has been sampled or not? Consider FNA if it has not.  3.  No new suspicious nodes.      Nodules are characterized per  ACR Thyroid Imaging, Reporting and Data System (TI-RADS): White Paper of the ACR TI-RADS Committee  Alec Ulloa. et al. Journal of the American College of Radiology 2017. Volume 14 (2017), Issue 5, 587-867.      [Access Center: Please see above report regarding right superior cervical node.]    This report will be copied to the [facility] to ensure a provider acknowledges the finding. Access Center is available Monday through Friday 8am-3:30 pm.      Last thyroid nuclear scan:  No results found for this or any previous visit.      Current Medications     Outpatient Medications Prior to Visit   Medication Sig Dispense Refill     acamprosate (CAMPRAL) 333 MG EC tablet TAKE 2 TABLETS BY MOUTH 3 TIMES A  tablet 0      azelastine (OPTIVAR) 0.05 % ophthalmic solution Place 1 drop into both eyes 2 times daily as needed       doxycycline monohydrate (ADOXA) 100 MG tablet Take 1 tablet (100 mg) by mouth daily 90 tablet 3     fexofenadine (ALLEGRA) 180 MG tablet Take 1 tablet (180 mg) by mouth At Bedtime       gabapentin (NEURONTIN) 300 MG capsule Take 1 capsule (300 mg) by mouth 2 times daily 60 capsule 11     magnesium oxide (MAG-OX) 400 MG tablet Take 1 tablet (400 mg) by mouth daily 30 tablet 0     melatonin 3 MG tablet Take 3 mg by mouth At Bedtime       montelukast (SINGULAIR) 10 MG tablet Take 1 tablet by mouth daily at 2 pm       sertraline (ZOLOFT) 50 MG tablet Take 1 tablet (50 mg) by mouth daily 90 tablet 3     SUMAtriptan (IMITREX) 50 MG tablet [SUMATRIPTAN (IMITREX) 50 MG TABLET] TAKE 1/2 TABLET (25 MG TOTAL) BY MOUTH EVERY 2 HOURS AS NEEDED FOR MIGRAINE. 10 tablet 2     triamcinolone (KENALOG) 0.1 % external ointment Apply topically 2 times daily as needed for irritation (to hands)       vitamin B-12 (CYANOCOBALAMIN) 500 MCG tablet Take 500 mcg by mouth daily       albuterol (PROAIR HFA/PROVENTIL HFA/VENTOLIN HFA) 108 (90 Base) MCG/ACT inhaler Inhale 2 puffs into the lungs every 4 hours as needed for shortness of breath / dyspnea or wheezing (Patient not taking: Reported on 9/14/2023)       calcitRIOL (ROCALTROL) 0.5 MCG capsule Take 1 capsule (0.5 mcg) by mouth daily 90 capsule 3     SYNTHROID 150 MCG tablet TAKE 1 TABLET BY MOUTH EVERY DAY 45 tablet 0     No facility-administered medications prior to visit.       Answers submitted by the patient for this visit:  Symptoms you have experienced in the last 30 days (Submitted on 9/14/2023)  General Symptoms: No  Skin Symptoms: No  HENT Symptoms: No  EYE SYMPTOMS: No  HEART SYMPTOMS: No  LUNG SYMPTOMS: No  INTESTINAL SYMPTOMS: No  URINARY SYMPTOMS: No  GYNECOLOGIC SYMPTOMS: No  BREAST SYMPTOMS: No  SKELETAL SYMPTOMS: No  BLOOD SYMPTOMS: No  NERVOUS SYSTEM SYMPTOMS:  No  MENTAL HEALTH SYMPTOMS: No      Again, thank you for allowing me to participate in the care of your patient.        Sincerely,        Sharron Howard NP

## 2023-12-08 DIAGNOSIS — M25.559 ARTHRALGIA OF HIP, UNSPECIFIED LATERALITY: ICD-10-CM

## 2023-12-08 DIAGNOSIS — F10.230 ALCOHOL DEPENDENCE WITH UNCOMPLICATED WITHDRAWAL (H): ICD-10-CM

## 2023-12-08 RX ORDER — ACAMPROSATE CALCIUM 333 MG/1
666 TABLET, DELAYED RELEASE ORAL 3 TIMES DAILY
Qty: 180 TABLET | Refills: 0 | Status: SHIPPED | OUTPATIENT
Start: 2023-12-08 | End: 2024-01-09

## 2023-12-08 RX ORDER — GABAPENTIN 300 MG/1
300 CAPSULE ORAL 2 TIMES DAILY
Qty: 60 CAPSULE | Refills: 11 | Status: SHIPPED | OUTPATIENT
Start: 2023-12-08 | End: 2024-04-23

## 2023-12-20 ENCOUNTER — MYC REFILL (OUTPATIENT)
Dept: FAMILY MEDICINE | Facility: CLINIC | Age: 48
End: 2023-12-20
Payer: COMMERCIAL

## 2023-12-20 DIAGNOSIS — H10.13 ALLERGIC CONJUNCTIVITIS, BILATERAL: Primary | ICD-10-CM

## 2023-12-20 RX ORDER — AZELASTINE HYDROCHLORIDE 0.5 MG/ML
SOLUTION/ DROPS OPHTHALMIC
Qty: 6 ML | Refills: 1 | Status: SHIPPED | OUTPATIENT
Start: 2023-12-20 | End: 2024-01-16

## 2023-12-21 ENCOUNTER — MYC MEDICAL ADVICE (OUTPATIENT)
Dept: FAMILY MEDICINE | Facility: CLINIC | Age: 48
End: 2023-12-21
Payer: COMMERCIAL

## 2023-12-21 DIAGNOSIS — H10.13 ALLERGIC CONJUNCTIVITIS, BILATERAL: ICD-10-CM

## 2023-12-21 DIAGNOSIS — J30.2 SEASONAL ALLERGIC RHINITIS: Primary | ICD-10-CM

## 2023-12-22 RX ORDER — AZELASTINE 1 MG/ML
1 SPRAY, METERED NASAL 2 TIMES DAILY
Qty: 30 ML | Refills: 0 | Status: SHIPPED | OUTPATIENT
Start: 2023-12-22 | End: 2024-08-12

## 2023-12-22 NOTE — TELEPHONE ENCOUNTER
Writer was able to find this med in care everywhere on pt's previous med list at [facility]. Would PCP be willing to fill this for pt, or would you like an appt to prescribe med?    Order pended        Krystal Humphrey RN

## 2024-01-09 DIAGNOSIS — F10.230 ALCOHOL DEPENDENCE WITH UNCOMPLICATED WITHDRAWAL (H): ICD-10-CM

## 2024-01-09 RX ORDER — ACAMPROSATE CALCIUM 333 MG/1
666 TABLET, DELAYED RELEASE ORAL 3 TIMES DAILY
Qty: 180 TABLET | Refills: 0 | Status: SHIPPED | OUTPATIENT
Start: 2024-01-09 | End: 2024-04-23

## 2024-01-09 NOTE — TELEPHONE ENCOUNTER
90 DAY PRESCRIPTION REQUEST      Refill Request  Medication name: Pending Prescriptions:                       Disp   Refills    acamprosate (CAMPRAL) 333 MG EC tablet    180 ta*0            Sig: Take 2 tablets (666 mg) by mouth 3 times daily    Requested Pharmacy:  [facility] [id number] [address]

## 2024-01-16 DIAGNOSIS — H10.13 ALLERGIC CONJUNCTIVITIS, BILATERAL: ICD-10-CM

## 2024-01-16 RX ORDER — AZELASTINE HYDROCHLORIDE 0.5 MG/ML
SOLUTION/ DROPS OPHTHALMIC
Qty: 6 ML | Refills: 0 | Status: SHIPPED | OUTPATIENT
Start: 2024-01-16 | End: 2024-05-14

## 2024-01-16 NOTE — TELEPHONE ENCOUNTER
PATIENT REQUESTING A 90 DAY SUPPLY      Refill Request  Medication name: Pending Prescriptions:                       Disp   Refills    azelastine (OPTIVAR) 0.05 % ophthalmic so*6 mL   1            Sig: Place 1 drop into both eyes 2 times daily as           needed    Requested Pharmacy:  [id number] [place] [address]

## 2024-01-29 DIAGNOSIS — E89.0 POSTOPERATIVE HYPOTHYROIDISM: Primary | ICD-10-CM

## 2024-01-30 ENCOUNTER — LAB (OUTPATIENT)
Dept: LAB | Facility: CLINIC | Age: 49
End: 2024-01-30
Payer: COMMERCIAL

## 2024-01-30 DIAGNOSIS — E89.0 POSTOPERATIVE HYPOTHYROIDISM: ICD-10-CM

## 2024-01-30 DIAGNOSIS — E83.51 HYPOCALCEMIA: ICD-10-CM

## 2024-01-30 LAB
CA-I BLD-MCNC: 4.5 MG/DL (ref 4.4–5.2)
CREAT SERPL-MCNC: 0.81 MG/DL (ref 0.51–0.95)
EGFRCR SERPLBLD CKD-EPI 2021: 90 ML/MIN/1.73M2
T3 SERPL-MCNC: 101 NG/DL (ref 85–202)
T4 FREE SERPL-MCNC: 1.46 NG/DL (ref 0.9–1.7)
TSH SERPL DL<=0.005 MIU/L-ACNC: 0.01 UIU/ML (ref 0.3–4.2)

## 2024-01-30 PROCEDURE — 82565 ASSAY OF CREATININE: CPT

## 2024-01-30 PROCEDURE — 82330 ASSAY OF CALCIUM: CPT

## 2024-01-30 PROCEDURE — 36415 COLL VENOUS BLD VENIPUNCTURE: CPT

## 2024-01-30 PROCEDURE — 99000 SPECIMEN HANDLING OFFICE-LAB: CPT

## 2024-01-30 PROCEDURE — 84439 ASSAY OF FREE THYROXINE: CPT

## 2024-01-30 PROCEDURE — 84432 ASSAY OF THYROGLOBULIN: CPT | Mod: 90

## 2024-01-30 PROCEDURE — 84480 ASSAY TRIIODOTHYRONINE (T3): CPT

## 2024-01-30 PROCEDURE — 84443 ASSAY THYROID STIM HORMONE: CPT

## 2024-01-31 ENCOUNTER — PATIENT OUTREACH (OUTPATIENT)
Dept: GASTROENTEROLOGY | Facility: CLINIC | Age: 49
End: 2024-01-31
Payer: COMMERCIAL

## 2024-02-04 LAB — THYROGLOB SERPL-MCNC: 0.6 NG/ML

## 2024-03-12 ENCOUNTER — TRANSFERRED RECORDS (OUTPATIENT)
Dept: HEALTH INFORMATION MANAGEMENT | Facility: CLINIC | Age: 49
End: 2024-03-12
Payer: COMMERCIAL

## 2024-03-25 ENCOUNTER — PATIENT OUTREACH (OUTPATIENT)
Dept: CARE COORDINATION | Facility: CLINIC | Age: 49
End: 2024-03-25
Payer: COMMERCIAL

## 2024-04-22 ASSESSMENT — PATIENT HEALTH QUESTIONNAIRE - PHQ9
SUM OF ALL RESPONSES TO PHQ QUESTIONS 1-9: 2
SUM OF ALL RESPONSES TO PHQ QUESTIONS 1-9: 2
10. IF YOU CHECKED OFF ANY PROBLEMS, HOW DIFFICULT HAVE THESE PROBLEMS MADE IT FOR YOU TO DO YOUR WORK, TAKE CARE OF THINGS AT HOME, OR GET ALONG WITH OTHER PEOPLE: NOT DIFFICULT AT ALL

## 2024-04-23 ENCOUNTER — OFFICE VISIT (OUTPATIENT)
Dept: FAMILY MEDICINE | Facility: CLINIC | Age: 49
End: 2024-04-23
Payer: COMMERCIAL

## 2024-04-23 VITALS
SYSTOLIC BLOOD PRESSURE: 104 MMHG | HEART RATE: 61 BPM | OXYGEN SATURATION: 99 % | BODY MASS INDEX: 23.85 KG/M2 | DIASTOLIC BLOOD PRESSURE: 70 MMHG | TEMPERATURE: 97.8 F | HEIGHT: 64 IN | WEIGHT: 139.7 LBS | RESPIRATION RATE: 16 BRPM

## 2024-04-23 DIAGNOSIS — E89.0 POSTOPERATIVE HYPOTHYROIDISM: ICD-10-CM

## 2024-04-23 DIAGNOSIS — Z01.818 PRE-OP EXAM: Primary | ICD-10-CM

## 2024-04-23 DIAGNOSIS — F10.230 ALCOHOL DEPENDENCE WITH UNCOMPLICATED WITHDRAWAL (H): ICD-10-CM

## 2024-04-23 DIAGNOSIS — N93.9 ABNORMAL UTERINE BLEEDING: ICD-10-CM

## 2024-04-23 DIAGNOSIS — L70.0 ACNE VULGARIS: ICD-10-CM

## 2024-04-23 DIAGNOSIS — E83.51 HYPOCALCEMIA: ICD-10-CM

## 2024-04-23 DIAGNOSIS — F32.5 MAJOR DEPRESSIVE DISORDER WITH SINGLE EPISODE, IN FULL REMISSION (H): ICD-10-CM

## 2024-04-23 DIAGNOSIS — J30.2 SEASONAL ALLERGIC RHINITIS, UNSPECIFIED TRIGGER: ICD-10-CM

## 2024-04-23 DIAGNOSIS — G43.909 MIGRAINE WITHOUT STATUS MIGRAINOSUS, NOT INTRACTABLE, UNSPECIFIED MIGRAINE TYPE: ICD-10-CM

## 2024-04-23 PROBLEM — E88.89 KETOSIS (H): Status: RESOLVED | Noted: 2022-10-11 | Resolved: 2024-04-23

## 2024-04-23 LAB
ANION GAP SERPL CALCULATED.3IONS-SCNC: 9 MMOL/L (ref 7–15)
BASOPHILS # BLD AUTO: 0 10E3/UL (ref 0–0.2)
BASOPHILS NFR BLD AUTO: 1 %
BUN SERPL-MCNC: 11.4 MG/DL (ref 6–20)
CALCIUM SERPL-MCNC: 8.8 MG/DL (ref 8.6–10)
CHLORIDE SERPL-SCNC: 105 MMOL/L (ref 98–107)
CREAT SERPL-MCNC: 0.8 MG/DL (ref 0.51–0.95)
DEPRECATED HCO3 PLAS-SCNC: 25 MMOL/L (ref 22–29)
EGFRCR SERPLBLD CKD-EPI 2021: >90 ML/MIN/1.73M2
EOSINOPHIL # BLD AUTO: 0 10E3/UL (ref 0–0.7)
EOSINOPHIL NFR BLD AUTO: 1 %
ERYTHROCYTE [DISTWIDTH] IN BLOOD BY AUTOMATED COUNT: 13.1 % (ref 10–15)
GLUCOSE SERPL-MCNC: 88 MG/DL (ref 70–99)
HCT VFR BLD AUTO: 37.5 % (ref 35–47)
HGB BLD-MCNC: 11.7 G/DL (ref 11.7–15.7)
IMM GRANULOCYTES # BLD: 0 10E3/UL
IMM GRANULOCYTES NFR BLD: 0 %
LYMPHOCYTES # BLD AUTO: 1.5 10E3/UL (ref 0.8–5.3)
LYMPHOCYTES NFR BLD AUTO: 31 %
MCH RBC QN AUTO: 28.1 PG (ref 26.5–33)
MCHC RBC AUTO-ENTMCNC: 31.2 G/DL (ref 31.5–36.5)
MCV RBC AUTO: 90 FL (ref 78–100)
MONOCYTES # BLD AUTO: 0.4 10E3/UL (ref 0–1.3)
MONOCYTES NFR BLD AUTO: 9 %
NEUTROPHILS # BLD AUTO: 2.8 10E3/UL (ref 1.6–8.3)
NEUTROPHILS NFR BLD AUTO: 59 %
PLATELET # BLD AUTO: 296 10E3/UL (ref 150–450)
POTASSIUM SERPL-SCNC: 4.6 MMOL/L (ref 3.4–5.3)
RBC # BLD AUTO: 4.16 10E6/UL (ref 3.8–5.2)
SODIUM SERPL-SCNC: 139 MMOL/L (ref 135–145)
WBC # BLD AUTO: 4.8 10E3/UL (ref 4–11)

## 2024-04-23 PROCEDURE — 80048 BASIC METABOLIC PNL TOTAL CA: CPT | Performed by: STUDENT IN AN ORGANIZED HEALTH CARE EDUCATION/TRAINING PROGRAM

## 2024-04-23 PROCEDURE — 85025 COMPLETE CBC W/AUTO DIFF WBC: CPT | Performed by: STUDENT IN AN ORGANIZED HEALTH CARE EDUCATION/TRAINING PROGRAM

## 2024-04-23 PROCEDURE — 99214 OFFICE O/P EST MOD 30 MIN: CPT | Performed by: STUDENT IN AN ORGANIZED HEALTH CARE EDUCATION/TRAINING PROGRAM

## 2024-04-23 PROCEDURE — 36415 COLL VENOUS BLD VENIPUNCTURE: CPT | Performed by: STUDENT IN AN ORGANIZED HEALTH CARE EDUCATION/TRAINING PROGRAM

## 2024-04-23 PROCEDURE — G2211 COMPLEX E/M VISIT ADD ON: HCPCS | Performed by: STUDENT IN AN ORGANIZED HEALTH CARE EDUCATION/TRAINING PROGRAM

## 2024-04-23 RX ORDER — ACAMPROSATE CALCIUM 333 MG/1
666 TABLET, DELAYED RELEASE ORAL 2 TIMES DAILY
Qty: 180 TABLET | Refills: 0 | Status: SHIPPED | OUTPATIENT
Start: 2024-04-23 | End: 2024-08-12

## 2024-04-23 RX ORDER — L.ACID/L.CASEI/B.BIF/B.LON/FOS 2B CELL-50
CAPSULE ORAL
COMMUNITY

## 2024-04-23 NOTE — PROGRESS NOTES
Preoperative Evaluation  [facility]  [address] [address]  Phone: [phone]  Fax: [phone]  Primary Provider: Yamilka Craven  Pre-op Performing Provider: LUTHER HAGER  Apr 23, 2024       Joan is a 48 year old, presenting for the following:  Pre-Op Exam        4/23/2024     8:56 AM   Additional Questions   Roomed by Khadra BROUSSARD     Surgical Information  Surgery/Procedure: ROBOTIC ASSISTED TOTAL LAPAROSCOPIC HYSTERECTOMY WITH BILATERAL SALPINGECTOMY, CYSTOSCOPY   Surgery Location: [facility]  Surgeon: Nasima Venegas DO   Surgery Date: 05/03/2024  Time of Surgery: 7:20 AM  Where patient plans to recover: At home with family  Fax number for surgical facility: Note does not need to be faxed, will be available electronically in Epic.    Assessment & Plan     The proposed surgical procedure is considered INTERMEDIATE risk.    Pre-op exam  Joan is a 48-year-old female who presents today for preoperative exam for hysterectomy and bilateral salpingectomy done for abnormal uterine bleeding.  She has a medical history of  papillary thyroid cancer status post thyroidectomy and postoperative hypothyroidism, alcohol dependence in remission  - Basic metabolic panel  - CBC with platelets and differential  - Basic metabolic panel  - CBC with platelets and differential    Abnormal uterine bleeding      Postoperative hypothyroidism  Followed by endocrinology, on Synthroid 150 mcg.  At TSH goal    Alcohol dependence with uncomplicated withdrawal (H)  Sober since October 2022 when she was hospitalized, continues on acamprosate which she takes twice daily reports that it does decrease cravings.  - acamprosate (CAMPRAL) 333 MG EC tablet  Dispense: 180 tablet; Refill: 0    Major depressive disorder with single episode, in full remission (H24)  On Zoloft 50 mg, mood is good.    Acne vulgaris  On doxycycline for acne, well-controlled    Seasonal allergic  rhinitis, unspecified trigger  On Allegra, azelastine ophthalmic solution and nasal spray, well-controlled    Migraine without status migrainosus, not intractable, unspecified migraine type  Uses Imitrex as needed, well-controlled    Hypocalcemia  Has hypocalcemia, treated with calcitriol, has been well-controlled, repeat calcium today.    Possible Sleep Apnea: low risk.       4/23/2024     9:11 AM   STOP-Bang Total Score   Total Score 2   Risk Stratification 0 - 2: Low Risk for LAM           - No identified additional risk factors other than previously addressed    Antiplatelet or Anticoagulation Medication Instructions   - Patient is on no antiplatelet or anticoagulation medications.    Additional Medication Instructions  Patient is to take all scheduled medications on the day of surgery EXCEPT for modifications listed below:     - Herbal medications and vitamins: HOLD 14 days prior to surgery.    Recommendation  APPROVAL GIVEN to proceed with proposed procedure, without further diagnostic evaluation.          Subjective       HPI related to upcoming procedure:         4/22/2024     5:02 PM   Preop Questions   1. Have you ever had a heart attack or stroke? No   2. Have you ever had surgery on your heart or blood vessels, such as a stent placement, a coronary artery bypass, or surgery on an artery in your head, neck, heart, or legs? No   3. Do you have chest pain with activity? No   4. Do you have a history of  heart failure? No   5. Do you currently have a cold, bronchitis or symptoms of other infection? No   6. Do you have a cough, shortness of breath, or wheezing? No   7. Do you or anyone in your family have previous history of blood clots? YES - Father and maternal grandmother had stroke (60-80s for all of them)   8. Do you or does anyone in your family have a serious bleeding problem such as prolonged bleeding following surgeries or cuts? No   9. Have you ever had problems with anemia or been told to take iron  pills? YES - due to uterine bleeding.    10. Have you had any abnormal blood loss such as black, tarry or bloody stools, or abnormal vaginal bleeding? No   11. Have you ever had a blood transfusion? No   12. Are you willing to have a blood transfusion if it is medically needed before, during, or after your surgery? Yes   13. Have you or any of your relatives ever had problems with anesthesia? No   14. Do you have sleep apnea, excessive snoring or daytime drowsiness? YES - snoring according to her .    14a. Do you have a CPAP machine? No   15. Do you have any artifical heart valves or other implanted medical devices like a pacemaker, defibrillator, or continuous glucose monitor? No   16. Do you have artificial joints? No   17. Are you allergic to latex? No   18. Is there any chance that you may be pregnant? No       Health Care Directive  Patient does not have a Health Care Directive or Living Will: Discussed advance care planning with patient; however, patient declined at this time.    Preoperative Review of    reviewed - gabapentin last prescribed 12 08 2023      Status of Chronic Conditions:  DEPRESSION - Patient has a long history of Depression of moderate severity requiring medication for control with recent symptoms being stable..Current symptoms of depression include none.     HYPOTHYROIDISM - Patient has  post operative Hypothyroidism, after thyroidectomy for papillary thyroid carcinoma. Patient has been doing well, noting no tremor, insomnia, hair loss or changes in skin texture. Continues to take medications as directed, without adverse reactions or side effects. Last TSH   Lab Results   Component Value Date    TSH 0.01 (L) 01/30/2024   .      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  10/29/18 total thyroidectomy-  The pathology report shows 2 separate 2 foci of papillary carcinoma in the right lobe measuring 1.3 cm and 0.1 cm.  Tumor did extend microscopically into the perithyroidal adipose tissue and was present at the inked posterior surgical margin.  There was no lymphovascular invasion by tumor.  A superior right parathyroid gland measuring 4 mm was seen in the pathologic specimen.    The left lobe showed benign nodular hyperplasia, as of the right.  There is no malignancy in the left lobe.  There is a 2.4 cm metastatic right level 6 lymph node containing papillary thyroid carcinoma.  Extracapsular extension was present.       FINDINGS: 24-hour uptake is 4.6%. Foci of uptake in the midline neck, which  suggests some residual thyroid tissue. Uptake in the salivary glands, stomach,  colon and urinary bladder as normal routes of excretion. No evidence of distant Metastasis  1/2019 ablated with 50.3 mCi I-131  1 week scan follow up after ablation-NM ONCOLOGY THYROID WHOLE BODY POST TREATMENT  1/17/2019 2:44 PM    CONCLUSION: No evidence of additional metastatic disease    Results for SERNA DAVID H (MRN [id number]) as of 3/3/2019 12:00   Ref. Range 11/15/2018 08:15 1/2/2019 11:15 2/22/2019 12:51   THYROGLOBULIN Latest Ref Range: <=33.0 ng/ml 11.1  4.2   ANTI-THYROGLOBULIN Latest Ref Range: <4.0 IU/mL <1.0  <1.0     2/13/20 ultrasound -IMPRESSION:  1.  Thyroidectomy. A 2.5 cm mass in the lower right resection bed is  suspicious  for residual disease or recurrence.     2.  Bilateral neck lymph nodes are suspicious for rachna metastatic disease,  despite their small sizes    2/21/20 ultrasound guide FNA-  Specimens:   A) - Right neck, lower nodule                                                                        B) - Right Neck Lymph Node                                                                           C) - Left Neck Lymph Node                                                                 Final Diagnosis   A) RIGHT LOWER NECK NODULE, ULTRASOUND GUIDED FNA:      Positive for malignancy, papillary carcinoma     B) RIGHT LOWER NECK LYMPH NODE, ULTRASOUND GUIDED FNA:      Positive for malignancy, papillary carcinoma     C) LEFT NECK LYMPH NODE, ULTRASOUND GUIDED FNA:      nondiagnostic - blood only     3/2020-Results for DAVID SERNA (MRN [id number]) as of 3/8/2020 10:42   Ref. Range 9/23/2019 12:35 12/9/2019 09:12 2/10/2020 12:48 3/2/2020 08:02   TSH Latest Ref Range: 0.35 - 4.94 uIU/mL   0.55 0.08 (L)   T4,FREE Latest Ref Range: 0.70 - 1.80 ng/dL   1.22    THYROGLOBULIN Latest Ref Range: <=33.0 ng/ml 3.8 6.4 2.6 11.0( before thyrogen)   ANTI-THYROGLOBULIN Latest Ref Range: <4.0 IU/mL <1.0 <1.0 <1.0 <1.0     3/2020 negative thyrogen induced whole body scan    3/2020 neck CT-IMPRESSION:   1.  Patient has undergone a previous thyroidectomy. Along the posterior aspect  of the right thyroidectomy bed, there is a somewhat mixed density, but mainly  low density/cystic appearing, lesion measuring 2.5 cm x 2 cm x 1.2 cm which has  been biopsied showing recurrent papillary cancer. This has similar measurements  to the ultrasound 02/13/2020 at this site. This lesion has a small crescentic  area of hyperdensity which may represent a small amount of residual thyroid  tissue versus tumor. There is a small 6 mm node adjacent to this lesion,  concerning for metastatic disease.  2.  There are at least 4 small right level 3 and  "level 4 lymph nodes in the  anterior right low neck region measuring up to 7 mm in transverse diameter. They  have a somewhat low density characteristic by CT and are concerning for  metastatic nodes in level 3 and level 4.  3.  There are a few tiny left level 2 cervical nodes, nonspecific in nature.  4.  There is a 9 mm isodense right jugulodigastric node nonspecific in nature.  This may not be metastatic.  5.  Remainder of the neck soft tissues unremarkable    4/6/20-underwent bilateral lateral neck dissection and reoperative central neck dissection  for management of papillary thyroid cancer with Dr Elliott at [facility]-  A.  Lymph nodes, right lateral neck levels II, III, IV, and  V, dissection:  Multiple (3 of 21) lymph nodes are involved  by metastatic papillary thyroid carcinoma.  B.  Lymph nodes, level VI central neck, dissection:  Multiple (6 of 7) lymph nodes are involved by metastatic  papillary thyroid carcinoma.  C.  Lymph nodes, left neck levels II, III, IV, and V,  dissection:  Multiple (11) lymph nodes are negative for  tumor.  Signed by Elkin Perez M.D. [phone] 4/6/2020 11:58 AM      Gross Description A.  Received fresh labeled \"right lateral neck levels II,  III, IV, and V lymph nodes\" is a 4.7 x 3.5 x 0.9 cm adipose  and lymphatic tissue with a 1.1 cm grossly positive lymph  node.  Lymph nodes submitted for frozen and permanent  sections.  Grossed by [facility].  B.  Received fresh labeled \"level VI central neck lymph  nodes\" is a 4.4 x 3.2 x 1 cm aggregate of adipose and  lymphatic tissue.  Lymph nodes submitted for frozen and  permanent sections.  Photographed.  Grossed by [facility].  C.  Received fresh labeled \"left neck levels II, III, IV,  and V lymph nodes\" is a 4.6 x 4.5 x 1.5 cm aggregate of  adipose and lymphatic tissue.  No grossly positive lymph  nodes identified.  Lymph nodes submitted for frozen and  permanent sections.  Grossed by [facility].     Block Summary A Right lateral neck level 2,3,4,5 " lymph nodes  A1 Right lateral neck level 2,3,4,5 lymph nodes 6(A1)  A2 Right lateral neck level 2,3,4,5 lymph nodes 3(A2)  A3 Right lateral neck level 2,3,4,5 lymph nodes 2(A3)  A4 Right lateral neck level 2,3,4,5 lymph nodes 6(A4)  A5 Right lateral neck level 2,3,4,5 lymph nodes 4(A5)  A6 Right lateral neck level 2,3,4,5 lymph nodes 3(A6)  B Level 6 central neck lymph nodes  B1 Level 6 central neck lymph nodes 5(B1)  B2 Level 6 central neck lymph nodes 1(B2)  B3 Level 6 central neck lymph nodes 1(B3)  B4 Level 6 central neck lymph nodes 1(B4)  C Left neck level 2,3,4,5 lymph nodes  C1 Left neck level 2,3,4,5 lymph nodes 6(C1)  C2 Left neck level 2,3,4,5 lymph nodes 4(C2)  C3 Left neck level 2,3,4,5 lymph nodes 4(C3)     Interpretation FINAL DIAGNOSIS  A.  Lymph nodes, right lateral neck levels II, III, IV, and  V, dissection:  Multiple (3 of 21) lymph nodes are involved  by metastatic papillary thyroid carcinoma, largest  metastatic deposit measures 1.1 cm in greatest dimension.  There is no extra-rachna extension.  B.  Lymph nodes, level VI central neck, dissection:  Multiple (6 of 7) lymph nodes are involved by metastatic  papillary thyroid carcinoma. A mild to moderately  hypercellular parathyroid is present in surrounding adipose  tissue.  C.  Lymph nodes, left neck levels II, III, IV, and V,  dissection:  Multiple (11) lymph nodes are negative for  tumor.      9/2020 negative neck ultrasound     Our typical goal with thyroid cancer of this nature is to keep the TSH between 0.05-0.2 for the first five years after diagnosis, or most recent resection of thyroid tumor, then between 0.1-0.4 x 5 years, then 0.3-1.5 thereafter.    DATE/TIME: 3/2/2021 8:26 AM     INDICATION: Papillary Thyroid Carcinoma (hc), post total thyroidectomy 2018, resection of recurrence April 2020  COMPARISON: Thyroid ultrasound 09/02/2020  TECHNIQUE: Thyroid ultrasound.      FINDINGS:  Post total thyroidectomy. No suspicious mass within the  surgical bed.     NECK: Right mid cervical lymph node measuring 1.0 x 0.4 x 0.3 cm, previously 1.1 x 0.4 x 0.2 cm. Morphologically normal upper left cervical lymph node measuring 0.6 x 0.2 x 0.6 cm.        IMPRESSION:  1.  Post total thyroidectomy with no sonographic evidence of residual or recurrent tumor      History of thyroid cancer 10/23/2018     Priority: Medium    Former smoker 10/23/2018     Priority: Medium    High risk human papilloma virus (HPV) infection of cervix 02/27/2018     Priority: Medium     Formatting of this note might be different from the original.  02/27/2018 NIL/HPV+, HPV 16/18 Negative  03/09/2021 NIL/HPV Negative    PLAN: Pap/HPV testing due 3/2024        Past Medical History:   Diagnosis Date    Acute pyelonephritis     Anxiety 4/19/2018    Major depressive disorder      Past Surgical History:   Procedure Laterality Date    BIOPSY BREAST Left 03/2012    HC REMOVAL OF TONSILS,<14 Y/O      Description: Tonsillectomy;  Recorded: 07/29/2009;     Current Outpatient Medications   Medication Sig Dispense Refill    acamprosate (CAMPRAL) 333 MG EC tablet Take 2 tablets (666 mg) by mouth 3 times daily 180 tablet 0    azelastine (ASTELIN) 0.1 % nasal spray Spray 1 spray into both nostrils 2 times daily 30 mL 0    azelastine (OPTIVAR) 0.05 % ophthalmic solution Place 1 drop into both eyes 2 times daily as needed 6 mL 0    calcitRIOL (ROCALTROL) 0.5 MCG capsule Take 1 capsule (0.5 mcg) by mouth daily 90 capsule 3    doxycycline monohydrate (ADOXA) 100 MG tablet Take 1 tablet (100 mg) by mouth daily 90 tablet 3    fexofenadine (ALLEGRA) 180 MG tablet Take 1 tablet (180 mg) by mouth At Bedtime      gabapentin (NEURONTIN) 300 MG capsule Take 1 capsule (300 mg) by mouth 2 times daily 60 capsule 11    magnesium oxide (MAG-OX) 400 MG tablet Take 1 tablet (400 mg) by mouth daily 30 tablet 0    melatonin 3 MG tablet Take 3 mg by mouth At Bedtime      montelukast (SINGULAIR) 10 MG tablet Take 1 tablet by  "mouth daily at 2 pm      sertraline (ZOLOFT) 50 MG tablet Take 1 tablet (50 mg) by mouth daily 90 tablet 3    SUMAtriptan (IMITREX) 50 MG tablet [SUMATRIPTAN (IMITREX) 50 MG TABLET] TAKE 1/2 TABLET (25 MG TOTAL) BY MOUTH EVERY 2 HOURS AS NEEDED FOR MIGRAINE. 10 tablet 2    SYNTHROID 150 MCG tablet Take 1 tablet (150 mcg) by mouth daily 90 tablet 3    triamcinolone (KENALOG) 0.1 % external ointment Apply topically 2 times daily as needed for irritation (to hands)      vitamin B-12 (CYANOCOBALAMIN) 500 MCG tablet Take 500 mcg by mouth daily      albuterol (PROAIR HFA/PROVENTIL HFA/VENTOLIN HFA) 108 (90 Base) MCG/ACT inhaler Inhale 2 puffs into the lungs every 4 hours as needed for shortness of breath / dyspnea or wheezing (Patient not taking: Reported on 2023)         Allergies   Allergen Reactions    Hydrocodone Swelling    Naproxen Other (See Comments)     induced ulcer        Social History     Tobacco Use    Smoking status: Former     Current packs/day: 0.00     Types: Cigarettes     Start date: 1996     Quit date: 2017     Years since quittin.2     Passive exposure: Past    Smokeless tobacco: Never    Tobacco comments:     1-2 cigarettes per day   Substance Use Topics    Alcohol use: No     Comment: Previous alcohol use. Sober now for 6-months (-)       History   Drug Use No         Review of Systems    Review of Systems  Constitutional, neuro, ENT, endocrine, pulmonary, cardiac, gastrointestinal, genitourinary, musculoskeletal, integument and psychiatric systems are negative, except as otherwise noted.    Objective    /70 (BP Location: Right arm, Patient Position: Sitting, Cuff Size: Adult Regular)   Pulse 61   Temp 97.8  F (36.6  C) (Oral)   Resp 16   Ht 1.626 m (5' 4\")   Wt 63.4 kg (139 lb 11.2 oz)   LMP 2024 (Exact Date)   SpO2 99%   BMI 23.98 kg/m     Estimated body mass index is 23.98 kg/m  as calculated from the following:    Height as of this encounter: 1.626 m " "(5' 4\").    Weight as of this encounter: 63.4 kg (139 lb 11.2 oz).  Physical Exam  GENERAL: alert and no distress  EYES: Eyes grossly normal to inspection, PERRL and conjunctivae and sclerae normal  HENT: ear canals and TM's normal, nose and mouth without ulcers or lesions  NECK: no adenopathy, no asymmetry, masses, or scars  RESP: lungs clear to auscultation - no rales, rhonchi or wheezes  CV: regular rate and rhythm, normal S1 S2, no S3 or S4, no murmur, click or rub, no peripheral edema  ABDOMEN: soft, nontender, no hepatosplenomegaly, no masses and bowel sounds normal  MS: no gross musculoskeletal defects noted, no edema  SKIN: no suspicious lesions or rashes  NEURO: Normal strength and tone, mentation intact and speech normal  PSYCH: mentation appears normal, affect normal/bright    Recent Labs   Lab Test 01/30/24  0925 10/13/22  0724 10/12/22  1928 10/12/22  1335 10/12/22  0735 10/11/22  2124 10/11/22  1253   HGB  --   --   --   --  13.0  --  14.7   PLT  --   --   --   --  219  --  301   INR  --   --   --   --   --  1.09  --    NA  --  137  --   --  140  --  136   POTASSIUM  --  3.7  3.7 3.7   < > 3.4* 3.5 3.3*   CR 0.81 0.76  --   --  0.68  --  0.77    < > = values in this interval not displayed.        Diagnostics  Labs pending at this time.  Results will be reviewed when available.   No EKG required, no history of coronary heart disease, significant arrhythmia, peripheral arterial disease or other structural heart disease.    Revised Cardiac Risk Index (RCRI)  The patient has the following serious cardiovascular risks for perioperative complications:   - No serious cardiac risks = 0 points     RCRI Interpretation: 0 points: Class I (very low risk - 0.4% complication rate)         Signed Electronically by: Kevin Pike MD  Copy of this evaluation report is provided to requesting physician.         "

## 2024-04-23 NOTE — PATIENT INSTRUCTIONS
Preparing for Your Surgery  Getting started  A nurse will call you to review your health history and instructions. They will give you an arrival time based on your scheduled surgery time. Please be ready to share:  Your doctor's clinic name and phone number  Your medical, surgical, and anesthesia history  A list of allergies and sensitivities  A list of medicines, including herbal treatments and over-the-counter drugs  Whether the patient has a legal guardian (ask how to send us the papers in advance)  Please tell us if you're pregnant--or if there's any chance you might be pregnant. Some surgeries may injure a fetus (unborn baby), so they require a pregnancy test. Surgeries that are safe for a fetus don't always need a test, and you can choose whether to have one.   If you have a child who's having surgery, please ask for a copy of Preparing for Your Child's Surgery.    Preparing for surgery  Within 10 to 30 days of surgery: Have a pre-op exam (sometimes called an H&P, or History and Physical). This can be done at a clinic or pre-operative center.  If you're having a , you may not need this exam. Talk to your care team.  At your pre-op exam, talk to your care team about all medicines you take. If you need to stop any medicines before surgery, ask when to start taking them again.  We do this for your safety. Many medicines can make you bleed too much during surgery. Some change how well surgery (anesthesia) drugs work.  Call your insurance company to let them know you're having surgery. (If you don't have insurance, call [phone].)  Call your clinic if there's any change in your health. This includes signs of a cold or flu (sore throat, runny nose, cough, rash, fever). It also includes a scrape or scratch near the surgery site.  If you have questions on the day of surgery, call your hospital or surgery center.  Eating and drinking guidelines  For your safety: Unless your surgeon tells you otherwise,  follow the guidelines below.  Eat and drink as usual until 8 hours before you arrive for surgery. After that, no food or milk.  Drink clear liquids until 2 hours before you arrive. These are liquids you can see through, like water, Gatorade, and Propel Water. They also include plain black coffee and tea (no cream or milk), candy, and breath mints. You can spit out gum when you arrive.  If you drink alcohol: Stop drinking it the night before surgery.  If your care team tells you to take medicine on the morning of surgery, it's okay to take it with a sip of water.  Preventing infection  Shower or bathe the night before and morning of your surgery. Follow the instructions your clinic gave you. (If no instructions, use regular soap.)  Don't shave or clip hair near your surgery site. We'll remove the hair if needed.  Don't smoke or vape the morning of surgery. You may chew nicotine gum up to 2 hours before surgery. A nicotine patch is okay.  Note: Some surgeries require you to completely quit smoking and nicotine. Check with your surgeon.  Your care team will make every effort to keep you safe from infection. We will:  Clean our hands often with soap and water (or an alcohol-based hand rub).  Clean the skin at your surgery site with a special soap that kills germs.  Give you a special gown to keep you warm. (Cold raises the risk of infection.)  Wear special hair covers, masks, gowns and gloves during surgery.  Give antibiotic medicine, if prescribed. Not all surgeries need antibiotics.  What to bring on the day of surgery  Photo ID and insurance card  Copy of your health care directive, if you have one  Glasses and hearing aids (bring cases)  You can't wear contacts during surgery  Inhaler and eye drops, if you use them (tell us about these when you arrive)  CPAP machine or breathing device, if you use them  A few personal items, if spending the night  If you have . . .  A pacemaker, ICD (cardiac defibrillator) or other  implant: Bring the ID card.  An implanted stimulator: Bring the remote control.  A legal guardian: Bring a copy of the certified (court-stamped) guardianship papers.  Please remove any jewelry, including body piercings. Leave jewelry and other valuables at home.  If you're going home the day of surgery  You must have a responsible adult drive you home. They should stay with you overnight as well.  If you don't have someone to stay with you, and you aren't safe to go home alone, we may keep you overnight. Insurance often won't pay for this.  After surgery  If it's hard to control your pain or you need more pain medicine, please call your surgeon's office.  Questions?   If you have any questions for your care team, list them here: _________________________________________________________________________________________________________________________________________________________________________ ____________________________________ ____________________________________ ____________________________________  For informational purposes only. Not to replace the advice of your health care provider. Copyright   2003, 2019 [place] [employer] All rights reserved. Clinically reviewed by Taryn Anthony MD. [employer] 274919 - REV 12/22.    How to Take Your Medication Before Surgery  - STOP taking all vitamins and herbal supplements 14 days before surgery.

## 2024-04-23 NOTE — H&P (VIEW-ONLY)
Preoperative Evaluation  [facility]  [address] [address]  Phone: [phone]  Fax: [phone]  Primary Provider: Yamilka Craven  Pre-op Performing Provider: LUTHER HAGER  Apr 23, 2024       Joan is a 48 year old, presenting for the following:  Pre-Op Exam        4/23/2024     8:56 AM   Additional Questions   Roomed by Khadra BROUSSARD     Surgical Information  Surgery/Procedure: ROBOTIC ASSISTED TOTAL LAPAROSCOPIC HYSTERECTOMY WITH BILATERAL SALPINGECTOMY, CYSTOSCOPY   Surgery Location: [facility]  Surgeon: Nasima Venegas DO   Surgery Date: 05/03/2024  Time of Surgery: 7:20 AM  Where patient plans to recover: At home with family  Fax number for surgical facility: Note does not need to be faxed, will be available electronically in Epic.    Assessment & Plan     The proposed surgical procedure is considered INTERMEDIATE risk.    Pre-op exam  Joan is a 48-year-old female who presents today for preoperative exam for hysterectomy and bilateral salpingectomy done for abnormal uterine bleeding.  She has a medical history of  papillary thyroid cancer status post thyroidectomy and postoperative hypothyroidism, alcohol dependence in remission  - Basic metabolic panel  - CBC with platelets and differential  - Basic metabolic panel  - CBC with platelets and differential    Abnormal uterine bleeding      Postoperative hypothyroidism  Followed by endocrinology, on Synthroid 150 mcg.  At TSH goal    Alcohol dependence with uncomplicated withdrawal (H)  Sober since October 2022 when she was hospitalized, continues on acamprosate which she takes twice daily reports that it does decrease cravings.  - acamprosate (CAMPRAL) 333 MG EC tablet  Dispense: 180 tablet; Refill: 0    Major depressive disorder with single episode, in full remission (H24)  On Zoloft 50 mg, mood is good.    Acne vulgaris  On doxycycline for acne, well-controlled    Seasonal allergic  rhinitis, unspecified trigger  On Allegra, azelastine ophthalmic solution and nasal spray, well-controlled    Migraine without status migrainosus, not intractable, unspecified migraine type  Uses Imitrex as needed, well-controlled    Hypocalcemia  Has hypocalcemia, treated with calcitriol, has been well-controlled, repeat calcium today.    Possible Sleep Apnea: low risk.       4/23/2024     9:11 AM   STOP-Bang Total Score   Total Score 2   Risk Stratification 0 - 2: Low Risk for LAM           - No identified additional risk factors other than previously addressed    Antiplatelet or Anticoagulation Medication Instructions   - Patient is on no antiplatelet or anticoagulation medications.    Additional Medication Instructions  Patient is to take all scheduled medications on the day of surgery EXCEPT for modifications listed below:     - Herbal medications and vitamins: HOLD 14 days prior to surgery.    Recommendation  APPROVAL GIVEN to proceed with proposed procedure, without further diagnostic evaluation.          Subjective       HPI related to upcoming procedure:         4/22/2024     5:02 PM   Preop Questions   1. Have you ever had a heart attack or stroke? No   2. Have you ever had surgery on your heart or blood vessels, such as a stent placement, a coronary artery bypass, or surgery on an artery in your head, neck, heart, or legs? No   3. Do you have chest pain with activity? No   4. Do you have a history of  heart failure? No   5. Do you currently have a cold, bronchitis or symptoms of other infection? No   6. Do you have a cough, shortness of breath, or wheezing? No   7. Do you or anyone in your family have previous history of blood clots? YES - Father and maternal grandmother had stroke (60-80s for all of them)   8. Do you or does anyone in your family have a serious bleeding problem such as prolonged bleeding following surgeries or cuts? No   9. Have you ever had problems with anemia or been told to take iron  pills? YES - due to uterine bleeding.    10. Have you had any abnormal blood loss such as black, tarry or bloody stools, or abnormal vaginal bleeding? No   11. Have you ever had a blood transfusion? No   12. Are you willing to have a blood transfusion if it is medically needed before, during, or after your surgery? Yes   13. Have you or any of your relatives ever had problems with anesthesia? No   14. Do you have sleep apnea, excessive snoring or daytime drowsiness? YES - snoring according to her .    14a. Do you have a CPAP machine? No   15. Do you have any artifical heart valves or other implanted medical devices like a pacemaker, defibrillator, or continuous glucose monitor? No   16. Do you have artificial joints? No   17. Are you allergic to latex? No   18. Is there any chance that you may be pregnant? No       Health Care Directive  Patient does not have a Health Care Directive or Living Will: Discussed advance care planning with patient; however, patient declined at this time.    Preoperative Review of    reviewed - gabapentin last prescribed 12 08 2023      Status of Chronic Conditions:  DEPRESSION - Patient has a long history of Depression of moderate severity requiring medication for control with recent symptoms being stable..Current symptoms of depression include none.     HYPOTHYROIDISM - Patient has  post operative Hypothyroidism, after thyroidectomy for papillary thyroid carcinoma. Patient has been doing well, noting no tremor, insomnia, hair loss or changes in skin texture. Continues to take medications as directed, without adverse reactions or side effects. Last TSH   Lab Results   Component Value Date    TSH 0.01 (L) 01/30/2024   .      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  10/29/18 total thyroidectomy-  The pathology report shows 2 separate 2 foci of papillary carcinoma in the right lobe measuring 1.3 cm and 0.1 cm.  Tumor did extend microscopically into the perithyroidal adipose tissue and was present at the inked posterior surgical margin.  There was no lymphovascular invasion by tumor.  A superior right parathyroid gland measuring 4 mm was seen in the pathologic specimen.    The left lobe showed benign nodular hyperplasia, as of the right.  There is no malignancy in the left lobe.  There is a 2.4 cm metastatic right level 6 lymph node containing papillary thyroid carcinoma.  Extracapsular extension was present.       FINDINGS: 24-hour uptake is 4.6%. Foci of uptake in the midline neck, which  suggests some residual thyroid tissue. Uptake in the salivary glands, stomach,  colon and urinary bladder as normal routes of excretion. No evidence of distant Metastasis  1/2019 ablated with 50.3 mCi I-131  1 week scan follow up after ablation-NM ONCOLOGY THYROID WHOLE BODY POST TREATMENT  1/17/2019 2:44 PM    CONCLUSION: No evidence of additional metastatic disease    Results for SERNA DAVID H (MRN [id number]) as of 3/3/2019 12:00   Ref. Range 11/15/2018 08:15 1/2/2019 11:15 2/22/2019 12:51   THYROGLOBULIN Latest Ref Range: <=33.0 ng/ml 11.1  4.2   ANTI-THYROGLOBULIN Latest Ref Range: <4.0 IU/mL <1.0  <1.0     2/13/20 ultrasound -IMPRESSION:  1.  Thyroidectomy. A 2.5 cm mass in the lower right resection bed is  suspicious  for residual disease or recurrence.     2.  Bilateral neck lymph nodes are suspicious for rachna metastatic disease,  despite their small sizes    2/21/20 ultrasound guide FNA-  Specimens:   A) - Right neck, lower nodule                                                                        B) - Right Neck Lymph Node                                                                           C) - Left Neck Lymph Node                                                                 Final Diagnosis   A) RIGHT LOWER NECK NODULE, ULTRASOUND GUIDED FNA:      Positive for malignancy, papillary carcinoma     B) RIGHT LOWER NECK LYMPH NODE, ULTRASOUND GUIDED FNA:      Positive for malignancy, papillary carcinoma     C) LEFT NECK LYMPH NODE, ULTRASOUND GUIDED FNA:      nondiagnostic - blood only     3/2020-Results for DAVID SERNA (MRN [id number]) as of 3/8/2020 10:42   Ref. Range 9/23/2019 12:35 12/9/2019 09:12 2/10/2020 12:48 3/2/2020 08:02   TSH Latest Ref Range: 0.35 - 4.94 uIU/mL   0.55 0.08 (L)   T4,FREE Latest Ref Range: 0.70 - 1.80 ng/dL   1.22    THYROGLOBULIN Latest Ref Range: <=33.0 ng/ml 3.8 6.4 2.6 11.0( before thyrogen)   ANTI-THYROGLOBULIN Latest Ref Range: <4.0 IU/mL <1.0 <1.0 <1.0 <1.0     3/2020 negative thyrogen induced whole body scan    3/2020 neck CT-IMPRESSION:   1.  Patient has undergone a previous thyroidectomy. Along the posterior aspect  of the right thyroidectomy bed, there is a somewhat mixed density, but mainly  low density/cystic appearing, lesion measuring 2.5 cm x 2 cm x 1.2 cm which has  been biopsied showing recurrent papillary cancer. This has similar measurements  to the ultrasound 02/13/2020 at this site. This lesion has a small crescentic  area of hyperdensity which may represent a small amount of residual thyroid  tissue versus tumor. There is a small 6 mm node adjacent to this lesion,  concerning for metastatic disease.  2.  There are at least 4 small right level 3 and  "level 4 lymph nodes in the  anterior right low neck region measuring up to 7 mm in transverse diameter. They  have a somewhat low density characteristic by CT and are concerning for  metastatic nodes in level 3 and level 4.  3.  There are a few tiny left level 2 cervical nodes, nonspecific in nature.  4.  There is a 9 mm isodense right jugulodigastric node nonspecific in nature.  This may not be metastatic.  5.  Remainder of the neck soft tissues unremarkable    4/6/20-underwent bilateral lateral neck dissection and reoperative central neck dissection  for management of papillary thyroid cancer with Dr Elliott at [facility]-  A.  Lymph nodes, right lateral neck levels II, III, IV, and  V, dissection:  Multiple (3 of 21) lymph nodes are involved  by metastatic papillary thyroid carcinoma.  B.  Lymph nodes, level VI central neck, dissection:  Multiple (6 of 7) lymph nodes are involved by metastatic  papillary thyroid carcinoma.  C.  Lymph nodes, left neck levels II, III, IV, and V,  dissection:  Multiple (11) lymph nodes are negative for  tumor.  Signed by Elkin Perez M.D. [id number] 4/6/2020 11:58 AM      Gross Description A.  Received fresh labeled \"right lateral neck levels II,  III, IV, and V lymph nodes\" is a 4.7 x 3.5 x 0.9 cm adipose  and lymphatic tissue with a 1.1 cm grossly positive lymph  node.  Lymph nodes submitted for frozen and permanent  sections.  Grossed by [facility].  B.  Received fresh labeled \"level VI central neck lymph  nodes\" is a 4.4 x 3.2 x 1 cm aggregate of adipose and  lymphatic tissue.  Lymph nodes submitted for frozen and  permanent sections.  Photographed.  Grossed by [facility].  C.  Received fresh labeled \"left neck levels II, III, IV,  and V lymph nodes\" is a 4.6 x 4.5 x 1.5 cm aggregate of  adipose and lymphatic tissue.  No grossly positive lymph  nodes identified.  Lymph nodes submitted for frozen and  permanent sections.  Grossed by [facility].     Block Summary A Right lateral neck level 2,3,4,5 " lymph nodes  A1 Right lateral neck level 2,3,4,5 lymph nodes 6(A1)  A2 Right lateral neck level 2,3,4,5 lymph nodes 3(A2)  A3 Right lateral neck level 2,3,4,5 lymph nodes 2(A3)  A4 Right lateral neck level 2,3,4,5 lymph nodes 6(A4)  A5 Right lateral neck level 2,3,4,5 lymph nodes 4(A5)  A6 Right lateral neck level 2,3,4,5 lymph nodes 3(A6)  B Level 6 central neck lymph nodes  B1 Level 6 central neck lymph nodes 5(B1)  B2 Level 6 central neck lymph nodes 1(B2)  B3 Level 6 central neck lymph nodes 1(B3)  B4 Level 6 central neck lymph nodes 1(B4)  C Left neck level 2,3,4,5 lymph nodes  C1 Left neck level 2,3,4,5 lymph nodes 6(C1)  C2 Left neck level 2,3,4,5 lymph nodes 4(C2)  C3 Left neck level 2,3,4,5 lymph nodes 4(C3)     Interpretation FINAL DIAGNOSIS  A.  Lymph nodes, right lateral neck levels II, III, IV, and  V, dissection:  Multiple (3 of 21) lymph nodes are involved  by metastatic papillary thyroid carcinoma, largest  metastatic deposit measures 1.1 cm in greatest dimension.  There is no extra-rachna extension.  B.  Lymph nodes, level VI central neck, dissection:  Multiple (6 of 7) lymph nodes are involved by metastatic  papillary thyroid carcinoma. A mild to moderately  hypercellular parathyroid is present in surrounding adipose  tissue.  C.  Lymph nodes, left neck levels II, III, IV, and V,  dissection:  Multiple (11) lymph nodes are negative for  tumor.      9/2020 negative neck ultrasound     Our typical goal with thyroid cancer of this nature is to keep the TSH between 0.05-0.2 for the first five years after diagnosis, or most recent resection of thyroid tumor, then between 0.1-0.4 x 5 years, then 0.3-1.5 thereafter.    DATE/TIME: 3/2/2021 8:26 AM     INDICATION: Papillary Thyroid Carcinoma (hc), post total thyroidectomy 2018, resection of recurrence April 2020  COMPARISON: Thyroid ultrasound 09/02/2020  TECHNIQUE: Thyroid ultrasound.      FINDINGS:  Post total thyroidectomy. No suspicious mass within the  surgical bed.     NECK: Right mid cervical lymph node measuring 1.0 x 0.4 x 0.3 cm, previously 1.1 x 0.4 x 0.2 cm. Morphologically normal upper left cervical lymph node measuring 0.6 x 0.2 x 0.6 cm.        IMPRESSION:  1.  Post total thyroidectomy with no sonographic evidence of residual or recurrent tumor      History of thyroid cancer 10/23/2018     Priority: Medium    Former smoker 10/23/2018     Priority: Medium    High risk human papilloma virus (HPV) infection of cervix 02/27/2018     Priority: Medium     Formatting of this note might be different from the original.  02/27/2018 NIL/HPV+, HPV 16/18 Negative  03/09/2021 NIL/HPV Negative    PLAN: Pap/HPV testing due 3/2024        Past Medical History:   Diagnosis Date    Acute pyelonephritis     Anxiety 4/19/2018    Major depressive disorder      Past Surgical History:   Procedure Laterality Date    BIOPSY BREAST Left 03/2012    HC REMOVAL OF TONSILS,<12 Y/O      Description: Tonsillectomy;  Recorded: 07/29/2009;     Current Outpatient Medications   Medication Sig Dispense Refill    acamprosate (CAMPRAL) 333 MG EC tablet Take 2 tablets (666 mg) by mouth 3 times daily 180 tablet 0    azelastine (ASTELIN) 0.1 % nasal spray Spray 1 spray into both nostrils 2 times daily 30 mL 0    azelastine (OPTIVAR) 0.05 % ophthalmic solution Place 1 drop into both eyes 2 times daily as needed 6 mL 0    calcitRIOL (ROCALTROL) 0.5 MCG capsule Take 1 capsule (0.5 mcg) by mouth daily 90 capsule 3    doxycycline monohydrate (ADOXA) 100 MG tablet Take 1 tablet (100 mg) by mouth daily 90 tablet 3    fexofenadine (ALLEGRA) 180 MG tablet Take 1 tablet (180 mg) by mouth At Bedtime      gabapentin (NEURONTIN) 300 MG capsule Take 1 capsule (300 mg) by mouth 2 times daily 60 capsule 11    magnesium oxide (MAG-OX) 400 MG tablet Take 1 tablet (400 mg) by mouth daily 30 tablet 0    melatonin 3 MG tablet Take 3 mg by mouth At Bedtime      montelukast (SINGULAIR) 10 MG tablet Take 1 tablet by  "mouth daily at 2 pm      sertraline (ZOLOFT) 50 MG tablet Take 1 tablet (50 mg) by mouth daily 90 tablet 3    SUMAtriptan (IMITREX) 50 MG tablet [SUMATRIPTAN (IMITREX) 50 MG TABLET] TAKE 1/2 TABLET (25 MG TOTAL) BY MOUTH EVERY 2 HOURS AS NEEDED FOR MIGRAINE. 10 tablet 2    SYNTHROID 150 MCG tablet Take 1 tablet (150 mcg) by mouth daily 90 tablet 3    triamcinolone (KENALOG) 0.1 % external ointment Apply topically 2 times daily as needed for irritation (to hands)      vitamin B-12 (CYANOCOBALAMIN) 500 MCG tablet Take 500 mcg by mouth daily      albuterol (PROAIR HFA/PROVENTIL HFA/VENTOLIN HFA) 108 (90 Base) MCG/ACT inhaler Inhale 2 puffs into the lungs every 4 hours as needed for shortness of breath / dyspnea or wheezing (Patient not taking: Reported on 2023)         Allergies   Allergen Reactions    Hydrocodone Swelling    Naproxen Other (See Comments)     induced ulcer        Social History     Tobacco Use    Smoking status: Former     Current packs/day: 0.00     Types: Cigarettes     Start date: 1996     Quit date: 2017     Years since quittin.2     Passive exposure: Past    Smokeless tobacco: Never    Tobacco comments:     1-2 cigarettes per day   Substance Use Topics    Alcohol use: No     Comment: Previous alcohol use. Sober now for 6-months (-)       History   Drug Use No         Review of Systems    Review of Systems  Constitutional, neuro, ENT, endocrine, pulmonary, cardiac, gastrointestinal, genitourinary, musculoskeletal, integument and psychiatric systems are negative, except as otherwise noted.    Objective    /70 (BP Location: Right arm, Patient Position: Sitting, Cuff Size: Adult Regular)   Pulse 61   Temp 97.8  F (36.6  C) (Oral)   Resp 16   Ht 1.626 m (5' 4\")   Wt 63.4 kg (139 lb 11.2 oz)   LMP 2024 (Exact Date)   SpO2 99%   BMI 23.98 kg/m     Estimated body mass index is 23.98 kg/m  as calculated from the following:    Height as of this encounter: 1.626 m " "(5' 4\").    Weight as of this encounter: 63.4 kg (139 lb 11.2 oz).  Physical Exam  GENERAL: alert and no distress  EYES: Eyes grossly normal to inspection, PERRL and conjunctivae and sclerae normal  HENT: ear canals and TM's normal, nose and mouth without ulcers or lesions  NECK: no adenopathy, no asymmetry, masses, or scars  RESP: lungs clear to auscultation - no rales, rhonchi or wheezes  CV: regular rate and rhythm, normal S1 S2, no S3 or S4, no murmur, click or rub, no peripheral edema  ABDOMEN: soft, nontender, no hepatosplenomegaly, no masses and bowel sounds normal  MS: no gross musculoskeletal defects noted, no edema  SKIN: no suspicious lesions or rashes  NEURO: Normal strength and tone, mentation intact and speech normal  PSYCH: mentation appears normal, affect normal/bright    Recent Labs   Lab Test 01/30/24  0925 10/13/22  0724 10/12/22  1928 10/12/22  1335 10/12/22  0735 10/11/22  2124 10/11/22  1253   HGB  --   --   --   --  13.0  --  14.7   PLT  --   --   --   --  219  --  301   INR  --   --   --   --   --  1.09  --    NA  --  137  --   --  140  --  136   POTASSIUM  --  3.7  3.7 3.7   < > 3.4* 3.5 3.3*   CR 0.81 0.76  --   --  0.68  --  0.77    < > = values in this interval not displayed.        Diagnostics  Labs pending at this time.  Results will be reviewed when available.   No EKG required, no history of coronary heart disease, significant arrhythmia, peripheral arterial disease or other structural heart disease.    Revised Cardiac Risk Index (RCRI)  The patient has the following serious cardiovascular risks for perioperative complications:   - No serious cardiac risks = 0 points     RCRI Interpretation: 0 points: Class I (very low risk - 0.4% complication rate)         Signed Electronically by: Kevin Pike MD  Copy of this evaluation report is provided to requesting physician.         "

## 2024-05-02 ENCOUNTER — ANESTHESIA EVENT (OUTPATIENT)
Dept: SURGERY | Facility: HOSPITAL | Age: 49
End: 2024-05-02
Payer: COMMERCIAL

## 2024-05-02 DIAGNOSIS — L70.0 ACNE VULGARIS: ICD-10-CM

## 2024-05-02 DIAGNOSIS — Z76.0 ENCOUNTER FOR MEDICATION REFILL: ICD-10-CM

## 2024-05-02 RX ORDER — MONTELUKAST SODIUM 10 MG/1
10 TABLET ORAL DAILY
COMMUNITY

## 2024-05-02 RX ORDER — GABAPENTIN 300 MG/1
300 CAPSULE ORAL 2 TIMES DAILY
COMMUNITY
End: 2024-08-12

## 2024-05-02 RX ORDER — ALBUTEROL SULFATE 90 UG/1
2 AEROSOL, METERED RESPIRATORY (INHALATION) EVERY 4 HOURS PRN
COMMUNITY
End: 2024-08-12

## 2024-05-03 ENCOUNTER — HOSPITAL ENCOUNTER (OUTPATIENT)
Facility: HOSPITAL | Age: 49
Discharge: HOME | End: 2024-05-03
Attending: OBSTETRICS & GYNECOLOGY | Admitting: OBSTETRICS & GYNECOLOGY
Payer: COMMERCIAL

## 2024-05-03 ENCOUNTER — ANESTHESIA (OUTPATIENT)
Dept: SURGERY | Facility: HOSPITAL | Age: 49
End: 2024-05-03
Payer: COMMERCIAL

## 2024-05-03 VITALS
DIASTOLIC BLOOD PRESSURE: 64 MMHG | SYSTOLIC BLOOD PRESSURE: 126 MMHG | OXYGEN SATURATION: 100 % | WEIGHT: 140.9 LBS | HEART RATE: 63 BPM | HEIGHT: 65 IN | RESPIRATION RATE: 16 BRPM | BODY MASS INDEX: 23.47 KG/M2 | TEMPERATURE: 97.2 F

## 2024-05-03 DIAGNOSIS — Z90.710 S/P HYSTERECTOMY: Primary | ICD-10-CM

## 2024-05-03 LAB — HCG UR QL: NEGATIVE

## 2024-05-03 PROCEDURE — 710N000009 HC RECOVERY PHASE 1, LEVEL 1, PER MIN: Performed by: OBSTETRICS & GYNECOLOGY

## 2024-05-03 PROCEDURE — 88302 TISSUE EXAM BY PATHOLOGIST: CPT | Mod: TC | Performed by: OBSTETRICS & GYNECOLOGY

## 2024-05-03 PROCEDURE — 81025 URINE PREGNANCY TEST: CPT | Performed by: OBSTETRICS & GYNECOLOGY

## 2024-05-03 PROCEDURE — 250N000025 HC SEVOFLURANE, PER MIN: Performed by: OBSTETRICS & GYNECOLOGY

## 2024-05-03 PROCEDURE — 272N000001 HC OR GENERAL SUPPLY STERILE: Performed by: OBSTETRICS & GYNECOLOGY

## 2024-05-03 PROCEDURE — 710N000012 HC RECOVERY PHASE 2, PER MINUTE: Performed by: OBSTETRICS & GYNECOLOGY

## 2024-05-03 PROCEDURE — 250N000011 HC RX IP 250 OP 636: Performed by: STUDENT IN AN ORGANIZED HEALTH CARE EDUCATION/TRAINING PROGRAM

## 2024-05-03 PROCEDURE — 258N000003 HC RX IP 258 OP 636: Performed by: ANESTHESIOLOGY

## 2024-05-03 PROCEDURE — 250N000013 HC RX MED GY IP 250 OP 250 PS 637: Performed by: OBSTETRICS & GYNECOLOGY

## 2024-05-03 PROCEDURE — 250N000011 HC RX IP 250 OP 636: Performed by: OBSTETRICS & GYNECOLOGY

## 2024-05-03 PROCEDURE — 999N000141 HC STATISTIC PRE-PROCEDURE NURSING ASSESSMENT: Performed by: OBSTETRICS & GYNECOLOGY

## 2024-05-03 PROCEDURE — 250N000012 HC RX MED GY IP 250 OP 636 PS 637: Performed by: ANESTHESIOLOGY

## 2024-05-03 PROCEDURE — 258N000003 HC RX IP 258 OP 636: Performed by: OBSTETRICS & GYNECOLOGY

## 2024-05-03 PROCEDURE — 250N000009 HC RX 250: Performed by: STUDENT IN AN ORGANIZED HEALTH CARE EDUCATION/TRAINING PROGRAM

## 2024-05-03 PROCEDURE — 360N000080 HC SURGERY LEVEL 7, PER MIN: Performed by: OBSTETRICS & GYNECOLOGY

## 2024-05-03 PROCEDURE — 250N000009 HC RX 250: Performed by: OBSTETRICS & GYNECOLOGY

## 2024-05-03 PROCEDURE — 88307 TISSUE EXAM BY PATHOLOGIST: CPT | Mod: 26 | Performed by: PATHOLOGY

## 2024-05-03 PROCEDURE — 370N000017 HC ANESTHESIA TECHNICAL FEE, PER MIN: Performed by: OBSTETRICS & GYNECOLOGY

## 2024-05-03 PROCEDURE — 250N000011 HC RX IP 250 OP 636: Performed by: ANESTHESIOLOGY

## 2024-05-03 RX ORDER — FENTANYL CITRATE 50 UG/ML
25 INJECTION, SOLUTION INTRAMUSCULAR; INTRAVENOUS EVERY 5 MIN PRN
Status: DISCONTINUED | OUTPATIENT
Start: 2024-05-03 | End: 2024-05-03 | Stop reason: HOSPADM

## 2024-05-03 RX ORDER — ONDANSETRON 4 MG/1
4 TABLET, ORALLY DISINTEGRATING ORAL EVERY 8 HOURS PRN
Qty: 4 TABLET | Refills: 0 | Status: SHIPPED | OUTPATIENT
Start: 2024-05-03 | End: 2024-08-12

## 2024-05-03 RX ORDER — PROPOFOL 10 MG/ML
INJECTION, EMULSION INTRAVENOUS CONTINUOUS PRN
Status: DISCONTINUED | OUTPATIENT
Start: 2024-05-03 | End: 2024-05-03

## 2024-05-03 RX ORDER — ACETAMINOPHEN 325 MG/1
975 TABLET ORAL ONCE
Status: DISCONTINUED | OUTPATIENT
Start: 2024-05-03 | End: 2024-05-03

## 2024-05-03 RX ORDER — CEFAZOLIN SODIUM/WATER 2 G/20 ML
2 SYRINGE (ML) INTRAVENOUS SEE ADMIN INSTRUCTIONS
Status: DISCONTINUED | OUTPATIENT
Start: 2024-05-03 | End: 2024-05-03 | Stop reason: HOSPADM

## 2024-05-03 RX ORDER — ACETAMINOPHEN 325 MG/1
975 TABLET ORAL ONCE
Status: DISCONTINUED | OUTPATIENT
Start: 2024-05-03 | End: 2024-05-03 | Stop reason: HOSPADM

## 2024-05-03 RX ORDER — ONDANSETRON 2 MG/ML
4 INJECTION INTRAMUSCULAR; INTRAVENOUS EVERY 30 MIN PRN
Status: DISCONTINUED | OUTPATIENT
Start: 2024-05-03 | End: 2024-05-03 | Stop reason: HOSPADM

## 2024-05-03 RX ORDER — NALOXONE HYDROCHLORIDE 0.4 MG/ML
0.1 INJECTION, SOLUTION INTRAMUSCULAR; INTRAVENOUS; SUBCUTANEOUS
Status: DISCONTINUED | OUTPATIENT
Start: 2024-05-03 | End: 2024-05-03 | Stop reason: HOSPADM

## 2024-05-03 RX ORDER — APREPITANT 40 MG/1
40 CAPSULE ORAL ONCE
Status: COMPLETED | OUTPATIENT
Start: 2024-05-03 | End: 2024-05-03

## 2024-05-03 RX ORDER — HYDROMORPHONE HCL IN WATER/PF 6 MG/30 ML
0.4 PATIENT CONTROLLED ANALGESIA SYRINGE INTRAVENOUS EVERY 5 MIN PRN
Status: DISCONTINUED | OUTPATIENT
Start: 2024-05-03 | End: 2024-05-03 | Stop reason: HOSPADM

## 2024-05-03 RX ORDER — IBUPROFEN 200 MG
800 TABLET ORAL ONCE
Status: DISCONTINUED | OUTPATIENT
Start: 2024-05-03 | End: 2024-05-03 | Stop reason: HOSPADM

## 2024-05-03 RX ORDER — KETOROLAC TROMETHAMINE 30 MG/ML
INJECTION, SOLUTION INTRAMUSCULAR; INTRAVENOUS PRN
Status: DISCONTINUED | OUTPATIENT
Start: 2024-05-03 | End: 2024-05-03

## 2024-05-03 RX ORDER — LIDOCAINE 40 MG/G
CREAM TOPICAL
Status: DISCONTINUED | OUTPATIENT
Start: 2024-05-03 | End: 2024-05-03 | Stop reason: HOSPADM

## 2024-05-03 RX ORDER — DEXAMETHASONE SODIUM PHOSPHATE 10 MG/ML
4 INJECTION, SOLUTION INTRAMUSCULAR; INTRAVENOUS
Status: DISCONTINUED | OUTPATIENT
Start: 2024-05-03 | End: 2024-05-03 | Stop reason: HOSPADM

## 2024-05-03 RX ORDER — OXYCODONE HYDROCHLORIDE 5 MG/1
10 TABLET ORAL
Status: DISCONTINUED | OUTPATIENT
Start: 2024-05-03 | End: 2024-05-03 | Stop reason: HOSPADM

## 2024-05-03 RX ORDER — ONDANSETRON 2 MG/ML
INJECTION INTRAMUSCULAR; INTRAVENOUS PRN
Status: DISCONTINUED | OUTPATIENT
Start: 2024-05-03 | End: 2024-05-03

## 2024-05-03 RX ORDER — BUPIVACAINE HYDROCHLORIDE 2.5 MG/ML
INJECTION, SOLUTION INFILTRATION; PERINEURAL PRN
Status: DISCONTINUED | OUTPATIENT
Start: 2024-05-03 | End: 2024-05-03 | Stop reason: HOSPADM

## 2024-05-03 RX ORDER — CEFAZOLIN SODIUM/WATER 2 G/20 ML
2 SYRINGE (ML) INTRAVENOUS
Status: DISCONTINUED | OUTPATIENT
Start: 2024-05-03 | End: 2024-05-03 | Stop reason: HOSPADM

## 2024-05-03 RX ORDER — SODIUM CHLORIDE, SODIUM LACTATE, POTASSIUM CHLORIDE, CALCIUM CHLORIDE 600; 310; 30; 20 MG/100ML; MG/100ML; MG/100ML; MG/100ML
INJECTION, SOLUTION INTRAVENOUS CONTINUOUS
Status: DISCONTINUED | OUTPATIENT
Start: 2024-05-03 | End: 2024-05-03 | Stop reason: HOSPADM

## 2024-05-03 RX ORDER — MAGNESIUM SULFATE 4 G/50ML
4 INJECTION INTRAVENOUS ONCE
Status: COMPLETED | OUTPATIENT
Start: 2024-05-03 | End: 2024-05-03

## 2024-05-03 RX ORDER — ONDANSETRON 4 MG/1
4 TABLET, ORALLY DISINTEGRATING ORAL EVERY 30 MIN PRN
Status: DISCONTINUED | OUTPATIENT
Start: 2024-05-03 | End: 2024-05-03 | Stop reason: HOSPADM

## 2024-05-03 RX ORDER — EPHEDRINE SULFATE 50 MG/ML
INJECTION, SOLUTION INTRAMUSCULAR; INTRAVENOUS; SUBCUTANEOUS PRN
Status: DISCONTINUED | OUTPATIENT
Start: 2024-05-03 | End: 2024-05-03

## 2024-05-03 RX ORDER — AMOXICILLIN 250 MG
1-2 CAPSULE ORAL 2 TIMES DAILY
Qty: 30 TABLET | Refills: 0 | Status: SHIPPED | OUTPATIENT
Start: 2024-05-03 | End: 2024-08-12

## 2024-05-03 RX ORDER — IBUPROFEN 800 MG/1
800 TABLET, FILM COATED ORAL EVERY 6 HOURS PRN
Qty: 30 TABLET | Refills: 0 | Status: SHIPPED | OUTPATIENT
Start: 2024-05-03 | End: 2024-08-12

## 2024-05-03 RX ORDER — OXYCODONE HYDROCHLORIDE 5 MG/1
5 TABLET ORAL
Status: COMPLETED | OUTPATIENT
Start: 2024-05-03 | End: 2024-05-03

## 2024-05-03 RX ORDER — DEXAMETHASONE SODIUM PHOSPHATE 4 MG/ML
4 INJECTION, SOLUTION INTRA-ARTICULAR; INTRALESIONAL; INTRAMUSCULAR; INTRAVENOUS; SOFT TISSUE
Status: DISCONTINUED | OUTPATIENT
Start: 2024-05-03 | End: 2024-05-03 | Stop reason: HOSPADM

## 2024-05-03 RX ORDER — FENTANYL CITRATE 50 UG/ML
INJECTION, SOLUTION INTRAMUSCULAR; INTRAVENOUS PRN
Status: DISCONTINUED | OUTPATIENT
Start: 2024-05-03 | End: 2024-05-03

## 2024-05-03 RX ORDER — OXYCODONE HYDROCHLORIDE 5 MG/1
5 TABLET ORAL
Status: DISCONTINUED | OUTPATIENT
Start: 2024-05-03 | End: 2024-05-03 | Stop reason: HOSPADM

## 2024-05-03 RX ORDER — FENTANYL CITRATE 50 UG/ML
50 INJECTION, SOLUTION INTRAMUSCULAR; INTRAVENOUS EVERY 5 MIN PRN
Status: DISCONTINUED | OUTPATIENT
Start: 2024-05-03 | End: 2024-05-03 | Stop reason: HOSPADM

## 2024-05-03 RX ORDER — PROPOFOL 10 MG/ML
INJECTION, EMULSION INTRAVENOUS PRN
Status: DISCONTINUED | OUTPATIENT
Start: 2024-05-03 | End: 2024-05-03

## 2024-05-03 RX ORDER — KETAMINE HYDROCHLORIDE 10 MG/ML
INJECTION INTRAMUSCULAR; INTRAVENOUS PRN
Status: DISCONTINUED | OUTPATIENT
Start: 2024-05-03 | End: 2024-05-03

## 2024-05-03 RX ORDER — DEXAMETHASONE SODIUM PHOSPHATE 10 MG/ML
INJECTION, SOLUTION INTRAMUSCULAR; INTRAVENOUS PRN
Status: DISCONTINUED | OUTPATIENT
Start: 2024-05-03 | End: 2024-05-03

## 2024-05-03 RX ORDER — SODIUM CHLORIDE, SODIUM LACTATE, POTASSIUM CHLORIDE, AND CALCIUM CHLORIDE .6; .31; .03; .02 G/100ML; G/100ML; G/100ML; G/100ML
IRRIGANT IRRIGATION PRN
Status: DISCONTINUED | OUTPATIENT
Start: 2024-05-03 | End: 2024-05-03 | Stop reason: HOSPADM

## 2024-05-03 RX ORDER — OXYCODONE HYDROCHLORIDE 5 MG/1
5 TABLET ORAL EVERY 4 HOURS PRN
Qty: 18 TABLET | Refills: 0 | Status: SHIPPED | OUTPATIENT
Start: 2024-05-03 | End: 2024-08-12

## 2024-05-03 RX ORDER — LORAZEPAM 2 MG/ML
.5-1 INJECTION INTRAMUSCULAR
Status: DISCONTINUED | OUTPATIENT
Start: 2024-05-03 | End: 2024-05-03 | Stop reason: HOSPADM

## 2024-05-03 RX ORDER — LIDOCAINE HYDROCHLORIDE 10 MG/ML
INJECTION, SOLUTION INFILTRATION; PERINEURAL PRN
Status: DISCONTINUED | OUTPATIENT
Start: 2024-05-03 | End: 2024-05-03

## 2024-05-03 RX ORDER — HYDROMORPHONE HCL IN WATER/PF 6 MG/30 ML
0.2 PATIENT CONTROLLED ANALGESIA SYRINGE INTRAVENOUS EVERY 5 MIN PRN
Status: DISCONTINUED | OUTPATIENT
Start: 2024-05-03 | End: 2024-05-03 | Stop reason: HOSPADM

## 2024-05-03 RX ORDER — DOXYCYCLINE 100 MG/1
100 TABLET ORAL DAILY
Qty: 90 TABLET | Refills: 3 | Status: SHIPPED | OUTPATIENT
Start: 2024-05-03 | End: 2024-08-12

## 2024-05-03 RX ORDER — ACETAMINOPHEN 325 MG/1
975 TABLET ORAL ONCE
Status: COMPLETED | OUTPATIENT
Start: 2024-05-03 | End: 2024-05-03

## 2024-05-03 RX ADMIN — PROPOFOL 200 MCG/KG/MIN: 10 INJECTION, EMULSION INTRAVENOUS at 07:37

## 2024-05-03 RX ADMIN — FENTANYL CITRATE 25 MCG: 50 INJECTION, SOLUTION INTRAMUSCULAR; INTRAVENOUS at 09:54

## 2024-05-03 RX ADMIN — HYDROMORPHONE HYDROCHLORIDE 0.5 MG: 1 INJECTION, SOLUTION INTRAMUSCULAR; INTRAVENOUS; SUBCUTANEOUS at 09:14

## 2024-05-03 RX ADMIN — SODIUM CHLORIDE, POTASSIUM CHLORIDE, SODIUM LACTATE AND CALCIUM CHLORIDE: 600; 310; 30; 20 INJECTION, SOLUTION INTRAVENOUS at 07:07

## 2024-05-03 RX ADMIN — SUGAMMADEX 200 MG: 100 INJECTION, SOLUTION INTRAVENOUS at 09:10

## 2024-05-03 RX ADMIN — KETAMINE HYDROCHLORIDE 10 MG: 10 INJECTION INTRAMUSCULAR; INTRAVENOUS at 09:07

## 2024-05-03 RX ADMIN — PROPOFOL 50 MG: 10 INJECTION, EMULSION INTRAVENOUS at 07:37

## 2024-05-03 RX ADMIN — ACETAMINOPHEN 975 MG: 325 TABLET ORAL at 06:52

## 2024-05-03 RX ADMIN — Medication 2 G: at 07:26

## 2024-05-03 RX ADMIN — Medication 10 MG: at 07:41

## 2024-05-03 RX ADMIN — MIDAZOLAM 2 MG: 1 INJECTION INTRAMUSCULAR; INTRAVENOUS at 07:20

## 2024-05-03 RX ADMIN — ROCURONIUM BROMIDE 10 MG: 50 INJECTION, SOLUTION INTRAVENOUS at 08:34

## 2024-05-03 RX ADMIN — ONDANSETRON 4 MG: 2 INJECTION INTRAMUSCULAR; INTRAVENOUS at 09:07

## 2024-05-03 RX ADMIN — KETAMINE HYDROCHLORIDE 10 MG: 10 INJECTION INTRAMUSCULAR; INTRAVENOUS at 08:34

## 2024-05-03 RX ADMIN — KETAMINE HYDROCHLORIDE 10 MG: 10 INJECTION INTRAMUSCULAR; INTRAVENOUS at 08:44

## 2024-05-03 RX ADMIN — HYDROMORPHONE HYDROCHLORIDE 0.5 MG: 1 INJECTION, SOLUTION INTRAMUSCULAR; INTRAVENOUS; SUBCUTANEOUS at 07:56

## 2024-05-03 RX ADMIN — OXYCODONE HYDROCHLORIDE 5 MG: 5 TABLET ORAL at 10:49

## 2024-05-03 RX ADMIN — PROPOFOL 150 MG: 10 INJECTION, EMULSION INTRAVENOUS at 07:27

## 2024-05-03 RX ADMIN — SODIUM CHLORIDE, POTASSIUM CHLORIDE, SODIUM LACTATE AND CALCIUM CHLORIDE: 600; 310; 30; 20 INJECTION, SOLUTION INTRAVENOUS at 08:48

## 2024-05-03 RX ADMIN — DEXAMETHASONE SODIUM PHOSPHATE 10 MG: 10 INJECTION, SOLUTION INTRAMUSCULAR; INTRAVENOUS at 07:37

## 2024-05-03 RX ADMIN — KETAMINE HYDROCHLORIDE 10 MG: 10 INJECTION INTRAMUSCULAR; INTRAVENOUS at 07:48

## 2024-05-03 RX ADMIN — ROCURONIUM BROMIDE 60 MG: 50 INJECTION, SOLUTION INTRAVENOUS at 07:27

## 2024-05-03 RX ADMIN — KETAMINE HYDROCHLORIDE 10 MG: 10 INJECTION INTRAMUSCULAR; INTRAVENOUS at 07:56

## 2024-05-03 RX ADMIN — LIDOCAINE HYDROCHLORIDE 5 ML: 10 INJECTION, SOLUTION INFILTRATION; PERINEURAL at 07:27

## 2024-05-03 RX ADMIN — Medication 5 MG: at 08:44

## 2024-05-03 RX ADMIN — Medication 10 MG: at 08:10

## 2024-05-03 RX ADMIN — MAGNESIUM SULFATE HEPTAHYDRATE 4 G: 80 INJECTION, SOLUTION INTRAVENOUS at 06:53

## 2024-05-03 RX ADMIN — FENTANYL CITRATE 100 MCG: 50 INJECTION INTRAMUSCULAR; INTRAVENOUS at 07:27

## 2024-05-03 RX ADMIN — APREPITANT 40 MG: 40 CAPSULE ORAL at 07:07

## 2024-05-03 RX ADMIN — KETOROLAC TROMETHAMINE 30 MG: 30 INJECTION, SOLUTION INTRAMUSCULAR at 09:17

## 2024-05-03 RX ADMIN — FENTANYL CITRATE 25 MCG: 50 INJECTION, SOLUTION INTRAMUSCULAR; INTRAVENOUS at 09:59

## 2024-05-03 ASSESSMENT — ACTIVITIES OF DAILY LIVING (ADL)
ADLS_ACUITY_SCORE: 18
ADLS_ACUITY_SCORE: 29
ADLS_ACUITY_SCORE: 18

## 2024-05-03 NOTE — ANESTHESIA CARE TRANSFER NOTE
Patient: Joan Christianson    Procedure: Procedure(s):  ROBOTIC ASSISTED TOTAL LAPAROSCOPIC HYSTERECTOMY WITH BILATERAL SALPINGECTOMY, CYSTOSCOPY       Diagnosis: Abnormal uterine bleeding [N93.9]  Diagnosis Additional Information: No value filed.    Anesthesia Type:   General     Note:    Oropharynx: oropharynx clear of all foreign objects  Level of Consciousness: drowsy  Oxygen Supplementation: nasal cannula  Level of Supplemental Oxygen (L/min / FiO2): 2  Independent Airway: airway patency satisfactory and stable  Dentition: dentition unchanged  Vital Signs Stable: post-procedure vital signs reviewed and stable  Report to RN Given: handoff report given  Patient transferred to: PACU    Handoff Report: Identifed the Patient, Identified the Reponsible Provider, Reviewed the pertinent medical history, Discussed the surgical course, Reviewed Intra-OP anesthesia mangement and issues during anesthesia, Set expectations for post-procedure period and Allowed opportunity for questions and acknowledgement of understanding      Vitals:  Vitals Value Taken Time   /68 05/03/24 0930   Temp 36.3  C (97.4  F) 05/03/24 0930   Pulse 78 05/03/24 0932   Resp 30 05/03/24 0932   SpO2 77 % 05/03/24 0932   Vitals shown include unfiled device data.    Electronically Signed By: JYOTHI Faye CRNA  May 3, 2024  9:33 AM

## 2024-05-03 NOTE — ANESTHESIA PROCEDURE NOTES
Airway       Patient location during procedure: OR       Procedure Start/Stop Times: 5/3/2024 7:29 AM  Staff -        Anesthesiologist:  John Oswald MD       CRNA: Mike Akins APRN CRNA       Performed By: CRNAIndications and Patient Condition       Indications for airway management: christina-procedural       Induction type:intravenous       Mask difficulty assessment: 1 - vent by mask    Final Airway Details       Final airway type: endotracheal airway       Successful airway: ETT - single  Endotracheal Airway Details        ETT size (mm): 7.0       Cuffed: yes       Cuff volume (mL): 8       Successful intubation technique: direct laryngoscopy       DL Blade Type: Kam 2       Grade View of Cords: 1       Adjucts: stylet       Position: Right       Measured from: gums/teeth       Secured at (cm): 21       Bite block used: None    Post intubation assessment        Placement verified by: capnometry, equal breath sounds and chest rise        Number of attempts at approach: 1       Secured with: silk tape       Ease of procedure: easy       Dentition: Intact and Unchanged       Dental guard used and removed. Dental Guard Type: Standard White.    Medication(s) Administered   Medication Administration Time: 5/3/2024 7:29 AM

## 2024-05-03 NOTE — ANESTHESIA POSTPROCEDURE EVALUATION
Patient: Joan Christianson    Procedure: Procedure(s):  ROBOTIC ASSISTED TOTAL LAPAROSCOPIC HYSTERECTOMY WITH BILATERAL SALPINGECTOMY, CYSTOSCOPY       Anesthesia Type:  General    Note:  Disposition: Outpatient   Postop Pain Control: Uneventful            Sign Out: Well controlled pain   PONV: No   Neuro/Psych: Uneventful            Sign Out: Acceptable/Baseline neuro status   Airway/Respiratory: Uneventful            Sign Out: Acceptable/Baseline resp. status   CV/Hemodynamics: Uneventful            Sign Out: Acceptable CV status; No obvious hypovolemia; No obvious fluid overload   Other NRE: NONE   DID A NON-ROUTINE EVENT OCCUR? No           Last vitals:  Vitals Value Taken Time   /58 05/03/24 1015   Temp 36.7  C (98  F) 05/03/24 1015   Pulse 61 05/03/24 1020   Resp 32 05/03/24 1017   SpO2 100 % 05/03/24 1020   Vitals shown include unfiled device data.    Electronically Signed By: John Oswald MD  May 3, 2024  2:01 PM

## 2024-05-03 NOTE — INTERVAL H&P NOTE
I have reviewed the surgical (or preoperative) H&P that is linked to this encounter, and examined the patient. There are no significant changes. Reviewed Plan for RAH-BS and cysto. Reviewed risks, benefits and alternatives. Reviewed post operative care. Proceed with surgery as scheduled.     Nasima Venegas, DO  [facility]  [phone]

## 2024-05-03 NOTE — OP NOTE
Name:  Joan Christianson  Record # [id number]   : 1975  Care Provider: Nasima Venegas DO   Admit Date:  5/3/2024       Obstetrics Gynecology - Operative Report    DATE OF SERVICE: 5/3/2024     PREOPERATIVE DIAGNOSIS:  heavy menses      PROCEDURE:   Da Guadalupe total laparoscopic assisted hysterectomy, bilateral salpingectomy and cystoscopy:    FINDINGS:  Normal appearing uterus with one small subserosal fibroid, bilateral fallopian tubes and ovaries were normal, no endometriosis or abdominal adhesions were noted . Via cystoscopy normal bladder with eeflux of urine from both ureters at the completion of the procedure.    PHYSICIAN:  Nasima Venegas DO     ASSISTANT:  OSVALDO Abarca    ESTIMATED BLOOD LOSS: 20 ml    ANESTHESIA:  General.    SPECIMENS REMOVED:  Uterus, cervix and bilateral tubes.     COMPLICATIONS:  none noted.    COMMENTS: Joan Christianson  was met preoperatively with her  where we discussed the procedure and the risks associated with the procedure.   She understood these to be, but not limited to injury to adjacent organs including bladder, bowel, ureter, infection and bleeding.  Patient signed consent and was brought back to the operating room in stable condition.    Patient underwent induction of a general anesthetic, she was carefully prepped and draped for the procedure. Timeout was performed. Bladder was drained with a Brizuela catheter, medium V care uterine manipulator placed into the uterus.      Attention was turned to the abdomen.  An incision was made above the umbilicus.  A Veress needle was introduced with a 2 pop technique, saline drop test confirmed adequate placement. Opening pressure was 2mmHg.   Insufflation was now done until 15mm of pressure were established.  An 7/8 Davinci XI nonbladed trocar was placed above the umbilicus. Two other robotic assist ports were place approximately  to the LEFT of the initial incision and a left upper quadrant 5mm assist port was placed.      2 more Davinci port were placed to RIGHT 9-10cm lateral to the umbilical incision. Trendelenburg assistance was used and the davinci was then brought to the patient s bedside.  The da Guadalupe was then docked.  The maryland bipolar forceps and PK bipolar forceps were placed at the left da Guadalupe port, the monopolar scissors placed in right side of the body and I took my turn to the da Guadalupe console.     The procedure began with identifying the anatomy.  Normal appearing uterus with one small subserosal fibroid, bilateral fallopian tubes and ovaries were normal, no endometriosis or abdominal adhesions were noted     The tube on the left side was cauterized, transected and removed.  The utero-ovarian ligament on the left side was then cauterized and transected.  The round ligament on the left side was cauterized and transected creating an anterior and posterior leave of the broad ligament. The anterior bladder flap was created.  The uterine vasculature was then skeletonized and cauterized. Attention was then turned to the right side.    This entire step was than repeated on the right side.  At this junction, the uterine vasculature on both sides near the uterocervical isthmus were  transected.  This cauterization and transection was continued along the cervix until the level of the cardinal ligament.  At this junction anterior colpotomy and posterior colpotomy were performed utilizing the monopolar scissors. The uterus and cervix were then delivered through the vagina.  The vaginal cuff was closed with 0 v-lock suture in a running fashion from left apex to right and then a second continuous layer moving back to the left apex. The pelvis was irrigated and surgicel powder was placed over the vaginal cuff.    At this junction, the procedure  was complete.  Sponge, lap and needle counts were correct x 2.  I took my turn to cystoscopy, noting normal ureter and bladder anatomy. Strong urine eflux bilaterally was noted from  both ureteral orfices.  The trocars were removed and the lubna was removed from the abdomen.    Skin was closed with 4-0 monocryl and dermabond glue was applied. The incision sites were injected with 25% marcaine.  She was brought to the recovery in stable condition.    Nasima Venegas,   [facility] [place] [facility]  [phone]

## 2024-05-06 LAB
PATH REPORT.COMMENTS IMP SPEC: NORMAL
PATH REPORT.COMMENTS IMP SPEC: NORMAL
PATH REPORT.FINAL DX SPEC: NORMAL
PATH REPORT.GROSS SPEC: NORMAL
PATH REPORT.MICROSCOPIC SPEC OTHER STN: NORMAL
PATH REPORT.RELEVANT HX SPEC: NORMAL
PHOTO IMAGE: NORMAL

## 2024-05-14 DIAGNOSIS — H10.13 ALLERGIC CONJUNCTIVITIS, BILATERAL: ICD-10-CM

## 2024-05-14 RX ORDER — AZELASTINE HYDROCHLORIDE 0.5 MG/ML
SOLUTION/ DROPS OPHTHALMIC
Qty: 6 ML | Refills: 0 | Status: SHIPPED | OUTPATIENT
Start: 2024-05-14 | End: 2024-06-05

## 2024-05-14 NOTE — TELEPHONE ENCOUNTER
Refill Request  Medication name: Pending Prescriptions:                       Disp   Refills    azelastine (OPTIVAR) 0.05 % ophthalmic so*6 mL   0            Sig: Place 1 drop into both eyes 2 times daily as           needed    Requested Pharmacy:  [id number] [address]

## 2024-06-05 DIAGNOSIS — H10.13 ALLERGIC CONJUNCTIVITIS, BILATERAL: ICD-10-CM

## 2024-06-05 NOTE — TELEPHONE ENCOUNTER
Pending Prescriptions:                       Disp   Refills    azelastine (OPTIVAR) 0.05 % ophthalmic so*6 mL   0            Sig: Place 1 drop into both eyes 2 times daily as           needed    Pharm[id number] [place] [facility] [address] [place] [address]

## 2024-06-06 RX ORDER — AZELASTINE HYDROCHLORIDE 0.5 MG/ML
SOLUTION/ DROPS OPHTHALMIC
Qty: 6 ML | Refills: 0 | Status: SHIPPED | OUTPATIENT
Start: 2024-06-06 | End: 2024-08-12

## 2024-06-15 ENCOUNTER — HEALTH MAINTENANCE LETTER (OUTPATIENT)
Age: 49
End: 2024-06-15

## 2024-07-27 DIAGNOSIS — F10.10 ETOH ABUSE: ICD-10-CM

## 2024-07-27 DIAGNOSIS — F32.A DEPRESSION, UNSPECIFIED DEPRESSION TYPE: ICD-10-CM

## 2024-08-01 DIAGNOSIS — E89.0 POSTOPERATIVE HYPOTHYROIDISM: ICD-10-CM

## 2024-08-01 RX ORDER — LEVOTHYROXINE SODIUM 150 MCG
150 TABLET ORAL DAILY
Qty: 90 TABLET | Refills: 3 | OUTPATIENT
Start: 2024-08-01

## 2024-08-07 SDOH — HEALTH STABILITY: PHYSICAL HEALTH: ON AVERAGE, HOW MANY MINUTES DO YOU ENGAGE IN EXERCISE AT THIS LEVEL?: 60 MIN

## 2024-08-07 SDOH — HEALTH STABILITY: PHYSICAL HEALTH: ON AVERAGE, HOW MANY DAYS PER WEEK DO YOU ENGAGE IN MODERATE TO STRENUOUS EXERCISE (LIKE A BRISK WALK)?: 7 DAYS

## 2024-08-07 ASSESSMENT — SOCIAL DETERMINANTS OF HEALTH (SDOH): HOW OFTEN DO YOU GET TOGETHER WITH FRIENDS OR RELATIVES?: TWICE A WEEK

## 2024-08-12 ENCOUNTER — OFFICE VISIT (OUTPATIENT)
Dept: FAMILY MEDICINE | Facility: CLINIC | Age: 49
End: 2024-08-12
Payer: COMMERCIAL

## 2024-08-12 VITALS
RESPIRATION RATE: 12 BRPM | SYSTOLIC BLOOD PRESSURE: 125 MMHG | HEIGHT: 66 IN | BODY MASS INDEX: 21.38 KG/M2 | OXYGEN SATURATION: 100 % | HEART RATE: 58 BPM | TEMPERATURE: 97.7 F | DIASTOLIC BLOOD PRESSURE: 85 MMHG | WEIGHT: 133 LBS

## 2024-08-12 DIAGNOSIS — Z90.710 HISTORY OF HYSTERECTOMY INCLUDING CERVIX: ICD-10-CM

## 2024-08-12 DIAGNOSIS — E89.0 POSTOPERATIVE HYPOTHYROIDISM: ICD-10-CM

## 2024-08-12 DIAGNOSIS — H10.13 ALLERGIC CONJUNCTIVITIS, BILATERAL: ICD-10-CM

## 2024-08-12 DIAGNOSIS — F10.230 ALCOHOL DEPENDENCE WITH UNCOMPLICATED WITHDRAWAL (H): ICD-10-CM

## 2024-08-12 DIAGNOSIS — J30.2 SEASONAL ALLERGIC RHINITIS, UNSPECIFIED TRIGGER: ICD-10-CM

## 2024-08-12 DIAGNOSIS — Z00.00 VISIT FOR PREVENTIVE HEALTH EXAMINATION: Primary | ICD-10-CM

## 2024-08-12 DIAGNOSIS — Z76.0 ENCOUNTER FOR MEDICATION REFILL: ICD-10-CM

## 2024-08-12 DIAGNOSIS — F10.11 ALCOHOL ABUSE, IN REMISSION: ICD-10-CM

## 2024-08-12 DIAGNOSIS — F32.5 MAJOR DEPRESSIVE DISORDER WITH SINGLE EPISODE, IN FULL REMISSION (H): ICD-10-CM

## 2024-08-12 DIAGNOSIS — L70.0 ACNE VULGARIS: ICD-10-CM

## 2024-08-12 PROCEDURE — 99396 PREV VISIT EST AGE 40-64: CPT | Mod: 25 | Performed by: NURSE PRACTITIONER

## 2024-08-12 PROCEDURE — 90471 IMMUNIZATION ADMIN: CPT | Performed by: NURSE PRACTITIONER

## 2024-08-12 PROCEDURE — 99214 OFFICE O/P EST MOD 30 MIN: CPT | Mod: 25 | Performed by: NURSE PRACTITIONER

## 2024-08-12 PROCEDURE — 90677 PCV20 VACCINE IM: CPT | Performed by: NURSE PRACTITIONER

## 2024-08-12 RX ORDER — AZELASTINE HYDROCHLORIDE 0.5 MG/ML
SOLUTION/ DROPS OPHTHALMIC
Qty: 6 ML | Refills: 0 | Status: SHIPPED | OUTPATIENT
Start: 2024-08-12 | End: 2024-08-12

## 2024-08-12 RX ORDER — AZELASTINE HYDROCHLORIDE 0.5 MG/ML
SOLUTION/ DROPS OPHTHALMIC
Qty: 6 ML | Refills: 3 | Status: SHIPPED | OUTPATIENT
Start: 2024-08-12

## 2024-08-12 RX ORDER — ACAMPROSATE CALCIUM 333 MG/1
666 TABLET, DELAYED RELEASE ORAL 2 TIMES DAILY
Qty: 180 TABLET | Refills: 3 | Status: SHIPPED | OUTPATIENT
Start: 2024-08-12

## 2024-08-12 RX ORDER — AZELASTINE 1 MG/ML
1 SPRAY, METERED NASAL 2 TIMES DAILY
Qty: 30 ML | Refills: 0 | Status: SHIPPED | OUTPATIENT
Start: 2024-08-12

## 2024-08-12 RX ORDER — DOXYCYCLINE 100 MG/1
100 TABLET ORAL DAILY
Qty: 90 TABLET | Refills: 3 | Status: SHIPPED | OUTPATIENT
Start: 2024-08-12

## 2024-08-12 NOTE — PROGRESS NOTES
Preventive Care Visit  [facility]  JYOTHI Feliciano CNP, Family Medicine  Aug 12, 2024      Assessment & Plan     Alcohol dependence with uncomplicated withdrawal (H)  **stable. Doing well.   - acamprosate (CAMPRAL) 333 MG EC tablet  Dispense: 180 tablet; Refill: 3    Acne vulgaris  **stable on med  - doxycycline monohydrate (ADOXA) 100 MG tablet  Dispense: 90 tablet; Refill: 3    Encounter for medication refill  **  - doxycycline monohydrate (ADOXA) 100 MG tablet  Dispense: 90 tablet; Refill: 3    Allergic conjunctivitis, bilateral  **    Major depressive disorder with single episode, in full remission (H24)  **    Postoperative hypothyroidism  **will continue to see Endo    Seasonal allergic rhinitis, unspecified trigger  **    Visit for preventive health examination      Alcohol abuse, in remission        Patient has been advised of split billing requirements and indicates understanding: Yes        Counseling  Appropriate preventive services were addressed with this patient via screening, questionnaire, or discussion as appropriate for fall prevention, nutrition, physical activity, Tobacco-use cessation, weight loss and cognition.  Checklist reviewing preventive services available has been given to the patient.  Reviewed patient's diet, addressing concerns and/or questions.           Jim Franco is a 48 year old, presenting for the following:  Physical    - has been feeling good. Has a network of other [facility] members that she stays connected with on a weekly basis. Sober. She feels her depression is controlled. Has two kids.       8/12/2024    10:36 AM   Additional Questions   Roomed by [place]        Health Care Directive  Patient does not have a Health Care Directive or Living Will: Discussed advance care planning with patient; information given to patient to review.    Healthy Habits:     Getting at least 3 servings of Calcium per day:  Yes    Bi-annual eye exam:  NO    Dental  care twice a year:  Yes    Sleep apnea or symptoms of sleep apnea:  Excessive snoring and Sleep apnea    Diet:  Regular (no restrictions)    Frequency of exercise:  6-7 days/week    Duration of exercise:  45-60 minutes    Taking medications regularly:  Yes    Barriers to taking medications:  None    Medication side effects:  None    Additional concerns today:  No          8/7/2024   General Health   How would you rate your overall physical health? Good   Feel stress (tense, anxious, or unable to sleep) To some extent      (!) STRESS CONCERN      8/7/2024   Nutrition   Three or more servings of calcium each day? Yes   Diet: Regular (no restrictions)   How many servings of fruit and vegetables per day? (!) 2-3   How many sweetened beverages each day? 0-1            8/7/2024   Exercise   Days per week of moderate/strenous exercise 7 days   Average minutes spent exercising at this level 60 min            8/7/2024   Social Factors   Frequency of gathering with friends or relatives Twice a week   Worry food won't last until get money to buy more No   Food not last or not have enough money for food? No   Do you have housing? (Housing is defined as stable permanent housing and does not include staying ouside in a car, in a tent, in an abandoned building, in an overnight shelter, or couch-surfing.) Yes   Are you worried about losing your housing? No   Lack of transportation? No   Unable to get utilities (heat,electricity)? No            8/7/2024   Dental   Dentist two times every year? Yes            8/7/2024   TB Screening   Were you born outside of the [place]? No            Today's PHQ-9 Score:       4/22/2024     5:00 PM   PHQ-9 SCORE   PHQ-9 Total Score MyChart 2 (Minimal depression)   PHQ-9 Total Score 2           8/7/2024   Substance Use   Alcohol more than 3/day or more than 7/wk No   Do you use any other substances recreationally? No        Social History     Tobacco Use    Smoking status: Former     Current packs/day:  0.00     Types: Cigarettes     Start date: 1996     Quit date: 2017     Years since quittin.5     Passive exposure: Past    Smokeless tobacco: Never    Tobacco comments:     1-2 cigarettes per day   Vaping Use    Vaping status: Never Used   Substance Use Topics    Alcohol use: No     Comment: Previous alcohol use. Sober now for 6-months (-)    Drug use: No           8/3/2023   LAST FHS-7 RESULTS   1st degree relative breast or ovarian cancer Yes   Any relative bilateral breast cancer Yes   Any male have breast cancer No   Any ONE woman have BOTH breast AND ovarian cancer No   Any woman with breast cancer before 50yrs Yes   2 or more relatives with breast AND/OR ovarian cancer Yes   2 or more relatives with breast AND/OR bowel cancer Yes           Mammogram Screening - Mammogram every 1-2 years updated in Health Maintenance based on mutual decision making        2024   STI Screening   New sexual partner(s) since last STI/HIV test? No        History of abnormal Pap smear: Status post hysterectomy with removal of cervix and no history of CIN2 or greater or cervical cancer. Health Maintenance and Surgical History updated.        Latest Ref Rng & Units 3/9/2021     8:05 AM 2018     2:23 PM   PAP / HPV   PAP   Negative for squamous intraepithelial lesion or malignancy  Electronically signed by Filemon Higuera CT (ASCP) on 3/5/2018 at  3:10 PM      HPV 16 DNA NEG  Negative    HPV 18 DNA NEG  Negative    Other HR HPV NEG  Positive    PAP-ABSTRACT  See Scanned Document            This result is from an external source.     ASCVD Risk   The 10-year ASCVD risk score (Riley RIOS, et al., 2019) is: 0.4%    Values used to calculate the score:      Age: 48 years      Sex: Female      Is Non- : No      Diabetic: No      Tobacco smoker: No      Systolic Blood Pressure: 125 mmHg      Is BP treated: No      HDL Cholesterol: 93 mg/dL      Total Cholesterol: 204 mg/dL      "  Reviewed and updated as needed this visit by Provider                    Past Medical History:   Diagnosis Date    Acne vulgaris 04/24/2023    Acute pyelonephritis     Alcohol dependence with uncomplicated withdrawal (H) 04/24/2023    Anxiety 04/19/2018    Arthralgia of hip 12/01/2018    Benign neoplasm of colon 04/25/2022    Former smoker 10/23/2018    High risk human papilloma virus (HPV) infection of cervix 02/27/2018    History of thyroid cancer 10/23/2018    Ketosis (H) 10/11/2022    Major depressive disorder     Malignant neoplasm metastatic to lymph nodes (H) 02/10/2022    Papillary thyroid carcinoma (H) 12/01/2018     Past Surgical History:   Procedure Laterality Date    BIOPSY BREAST Left 03/2012    HC REMOVAL OF TONSILS,<14 Y/O      Description: Tonsillectomy;  Recorded: 07/29/2009;    HYSTERECTOMY, TOTAL, ROBOT-ASSISTED, LAPAROSCOPIC, USING DA FRANSICO XI, WITH SALPINGECTOMY, CYSTOSCOPY Bilateral 5/3/2024    Procedure: ROBOTIC ASSISTED TOTAL LAPAROSCOPIC HYSTERECTOMY WITH BILATERAL SALPINGECTOMY, CYSTOSCOPY;  Surgeon: Nasima Venegas DO;  [facility]     Labs reviewed in EPIC      Review of Systems  Constitutional, HEENT, cardiovascular, pulmonary, gi and gu systems are negative, except as otherwise noted.     Objective    Exam  /85   Pulse 58   Temp 97.7  F (36.5  C) (Oral)   Resp 12   Ht 1.676 m (5' 6\")   Wt 60.3 kg (133 lb)   LMP 04/11/2024 (Exact Date)   SpO2 100%   BMI 21.47 kg/m     Estimated body mass index is 21.47 kg/m  as calculated from the following:    Height as of this encounter: 1.676 m (5' 6\").    Weight as of this encounter: 60.3 kg (133 lb).    Physical Exam  GENERAL: alert and no distress  HENT: ear canals and TM's normal, nose and mouth without ulcers or lesions  NECK: no adenopathy, no asymmetry, masses, or scars  RESP: lungs clear to auscultation - no rales, rhonchi or wheezes  CV: regular rate and rhythm, normal S1 S2, no S3 or S4, no murmur, click " or rub, no peripheral edema  ABDOMEN: soft, nontender, no hepatosplenomegaly, no masses and bowel sounds normal  MS: no gross musculoskeletal defects noted, no edema  SKIN: no suspicious lesions or rashes  PSYCH: mentation appears normal, affect normal/bright  LYMPH: no cervical, supraclavicular, axillary, or inguinal adenopathy        Signed Electronically by: JYOTHI Feliciano CNP

## 2024-08-13 ENCOUNTER — HOSPITAL ENCOUNTER (OUTPATIENT)
Dept: MAMMOGRAPHY | Facility: CLINIC | Age: 49
Discharge: HOME | End: 2024-08-13
Attending: NURSE PRACTITIONER | Admitting: NURSE PRACTITIONER
Payer: COMMERCIAL

## 2024-08-13 DIAGNOSIS — Z12.31 VISIT FOR SCREENING MAMMOGRAM: ICD-10-CM

## 2024-08-13 PROBLEM — F10.11 ALCOHOL ABUSE, IN REMISSION: Status: ACTIVE | Noted: 2024-08-13

## 2024-08-13 PROBLEM — F10.230 ALCOHOL DEPENDENCE WITH UNCOMPLICATED WITHDRAWAL (H): Status: RESOLVED | Noted: 2023-04-24 | Resolved: 2024-08-13

## 2024-08-13 PROBLEM — Z90.710 HISTORY OF HYSTERECTOMY INCLUDING CERVIX: Status: ACTIVE | Noted: 2024-08-13

## 2024-08-13 PROCEDURE — 77063 BREAST TOMOSYNTHESIS BI: CPT

## 2024-08-26 ENCOUNTER — DOCUMENTATION ONLY (OUTPATIENT)
Dept: ENDOCRINOLOGY | Facility: CLINIC | Age: 49
End: 2024-08-26
Payer: COMMERCIAL

## 2024-08-26 DIAGNOSIS — E89.0 POSTOPERATIVE HYPOTHYROIDISM: Primary | ICD-10-CM

## 2024-09-03 ENCOUNTER — LAB (OUTPATIENT)
Dept: LAB | Facility: CLINIC | Age: 49
End: 2024-09-03
Payer: COMMERCIAL

## 2024-09-03 DIAGNOSIS — E89.0 POSTOPERATIVE HYPOTHYROIDISM: ICD-10-CM

## 2024-09-03 LAB
T3FREE SERPL-MCNC: 2.4 PG/ML (ref 2–4.4)
T4 FREE SERPL-MCNC: 1.18 NG/DL (ref 0.9–1.7)
TSH SERPL DL<=0.005 MIU/L-ACNC: 0.01 UIU/ML (ref 0.3–4.2)

## 2024-09-03 PROCEDURE — 84439 ASSAY OF FREE THYROXINE: CPT

## 2024-09-03 PROCEDURE — 84443 ASSAY THYROID STIM HORMONE: CPT

## 2024-09-03 PROCEDURE — 36415 COLL VENOUS BLD VENIPUNCTURE: CPT

## 2024-09-03 PROCEDURE — 84481 FREE ASSAY (FT-3): CPT

## 2024-09-13 ENCOUNTER — OFFICE VISIT (OUTPATIENT)
Dept: ENDOCRINOLOGY | Facility: CLINIC | Age: 49
End: 2024-09-13
Payer: COMMERCIAL

## 2024-09-13 VITALS
HEART RATE: 66 BPM | SYSTOLIC BLOOD PRESSURE: 103 MMHG | WEIGHT: 137.5 LBS | DIASTOLIC BLOOD PRESSURE: 69 MMHG | BODY MASS INDEX: 22.19 KG/M2 | OXYGEN SATURATION: 99 %

## 2024-09-13 DIAGNOSIS — E89.0 POSTOPERATIVE HYPOTHYROIDISM: Primary | ICD-10-CM

## 2024-09-13 DIAGNOSIS — E83.51 HYPOCALCEMIA: ICD-10-CM

## 2024-09-13 PROCEDURE — 99214 OFFICE O/P EST MOD 30 MIN: CPT | Performed by: NURSE PRACTITIONER

## 2024-09-13 RX ORDER — CALCITRIOL 0.5 UG/1
0.5 CAPSULE, LIQUID FILLED ORAL DAILY
Qty: 90 CAPSULE | Refills: 3 | Status: SHIPPED | OUTPATIENT
Start: 2024-09-13

## 2024-09-13 RX ORDER — LEVOTHYROXINE SODIUM 150 MCG
150 TABLET ORAL DAILY
Qty: 90 TABLET | Refills: 3 | Status: SHIPPED | OUTPATIENT
Start: 2024-09-13

## 2024-09-13 NOTE — PROGRESS NOTES
"[facility] ENDOCRINOLOGY    Thyroid Note  9/13/2024    Joan Christianson, 1975, [id number]          Reason for visit      1. Postoperative hypothyroidism    2. Hypocalcemia        HPI     Joan Christianson is a very pleasant 48 year old old female who presents for follow up.  SUMMARY:    Joan is seen in follow-up for Postoperative Hypothyroidism. She is reporting that she is feeling \"so much better\" after having a Lap Hysterectomy. She sheepishly notes that she was back in the Gym \"before I was supposed to be\". Although she reports that she didn't do anything that might endanger her op area.     Her TSH remains low at 0.01 and fT4 is 1.18, and within normal range. She is taking Synthroid, 150 mcg daily. She is not interested in changing anything because she feels well. She is having no problems referable to her neck.     She remains on Calcitriol for Hypocalcemia. Her last level was seen in April at 8.8. She denies any palpitations or muscle twitching.       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)    Papillary thyroid carcinoma (H)    Postoperative hypothyroidism    Major depressive disorder with single episode, in full remission (H24)    Acne vulgaris    Seasonal allergic rhinitis, unspecified trigger    Migraine without status migrainosus, not intractable, unspecified migraine type    Alcohol abuse, in remission    History of hysterectomy including cervix       Family History       family history includes Breast Cancer (age of onset: 39.00) in her cousin; Breast Cancer (age of onset: 50.00) in her mother; Cancer in her paternal aunt and other family members; Coronary Artery Disease (age of onset: 70.00) in her paternal grandfather; Lung Cancer (age of onset: 55.00) in her " mother; Melanoma in her maternal aunt and maternal grandfather; Pulmonary Embolism (age of onset: 60.00) in her father.    Social History      reports that she quit smoking about 7 years ago. Her smoking use included cigarettes. She started smoking about 28 years ago. She has been exposed to tobacco smoke. She has never used smokeless tobacco. She reports that she does not drink alcohol and does not use drugs.      Review of Systems     Patient denies fatigue, weight changes, heat/cold intolerance, bowel/skin changes or CVS symptoms.   Remainder per HPI and per attached intake form.      Vital Signs     /69 (BP Location: Left arm, Patient Position: Sitting, Cuff Size: Adult Regular)   Pulse 66   Wt 62.4 kg (137 lb 8 oz)   LMP 04/11/2024 (Exact Date)   SpO2 99%   BMI 22.19 kg/m    Wt Readings from Last 3 Encounters:   09/13/24 62.4 kg (137 lb 8 oz)   08/12/24 60.3 kg (133 lb)   05/03/24 63.9 kg (140 lb 14.4 oz)       Physical Exam     General:  Normal, NIRD,appears euthyroid  Eyes:  Pupils equal, round and reactive to light; no proptosis, lid lag or  periorbital edema.  Thyroid:  Thyroid is absent..    Neck: No lymph nodes  Musculoskeletal:  Muscle strength grossly normal without evidence of wasting.  Heart:  Regular rate and rhythm without murmur.  Lungs:  Clear to auscultation.  Abdomen: Soft, non-tender, no masses or organomegaly  Neuro: Patella Reflexes were normal.No tremors  Skin:  No acanthosis nigricans or vitiligo        Assessment     1. Postoperative hypothyroidism    2. Hypocalcemia            Plan     Bio-chemically and physically euthyroid. She will remain on her current dose of Synthroid.     Refilled her Calcitriol.     Follow-up with me in 1 year.     Sharron Howard NP  [facility]  9/13/2024  10:29 AM    Lab Results     TSH   Date Value Ref Range Status   09/03/2024 0.01 (L) 0.30 - 4.20 uIU/mL Final   10/11/2022 0.06 (L) 0.30 - 5.00 uIU/mL Final     No components found for:  "\"THYROIDAB\"    No results found for: \"O9OMBKA\"    Imaging Results   Last thyroid ultrasound:  Results for orders placed during the hospital encounter of 03/21/23    US Thyroid    Narrative  EXAM: US THYROID  LOCATION: [facility]  DATE/TIME: 3/21/2023 7:23 AM    INDICATION: Thyroidectomy for cancer in 2018 with recurrence in 2020. Surveillance.  COMPARISON: 03/18/2022, 03/02/2021  TECHNIQUE: Thyroid ultrasound.    FINDINGS: Study done only by the technologist.    RIGHT lobe: Absent.  LEFT lobe: Absent.    NECK: A superior right cervical lymph node is again noted. This measures 11 x 4 x 3 mm, unchanged in size since 2021. The inferior component on the cine loops appears cystic, also unchanged since last year but a change in appearance since 2021.    Impression  IMPRESSION:  1.  One, small right cervical node is unchanged in size with a suggestion of a cystic component inferiorly as noted above, unchanged since last year but suggestion of a change in texture compared to the 2021 exam.  2.  It is unclear if this node has been sampled or not? Consider FNA if it has not.  3.  No new suspicious nodes.      Nodules are characterized per  ACR Thyroid Imaging, Reporting and Data System (TI-RADS): White Paper of the ACR TI-RADS Committee  Alec Ulloa. et al. Journal of the American College of Radiology 2017. Volume 14 (2017), Issue 5, 339-928.      [Access Center: Please see above report regarding right superior cervical node.]    This report will be copied to the [facility] to ensure a provider acknowledges the finding. Access Center is available Monday through Friday 8am-3:30 pm.      Last thyroid nuclear scan:  No results found for this or any previous visit.      Current Medications     Outpatient Medications Prior to Visit   Medication Sig Dispense Refill    acamprosate (CAMPRAL) 333 MG EC tablet Take 2 tablets (666 mg) by mouth 2 times daily 180 tablet 3    azelastine " (ASTELIN) 0.1 % nasal spray Spray 1 spray into both nostrils 2 times daily 30 mL 0    azelastine (OPTIVAR) 0.05 % ophthalmic solution Place 1 drop into both eyes 2 times daily as needed 6 mL 3    doxycycline monohydrate (ADOXA) 100 MG tablet Take 1 tablet (100 mg) by mouth daily 90 tablet 3    fexofenadine (ALLEGRA) 180 MG tablet Take 1 tablet (180 mg) by mouth At Bedtime      magnesium oxide (MAG-OX) 400 MG tablet Take 1 tablet (400 mg) by mouth daily 30 tablet 0    melatonin 3 MG tablet Take 3 mg by mouth At Bedtime      Probiotic Product (PROBIOTIC BLEND) CAPS       sertraline (ZOLOFT) 50 MG tablet Take 1 tablet (50 mg) by mouth daily 90 tablet 2    SUMAtriptan (IMITREX) 50 MG tablet [SUMATRIPTAN (IMITREX) 50 MG TABLET] TAKE 1/2 TABLET (25 MG TOTAL) BY MOUTH EVERY 2 HOURS AS NEEDED FOR MIGRAINE. 10 tablet 2    vitamin B-12 (CYANOCOBALAMIN) 500 MCG tablet Take 500 mcg by mouth daily      montelukast (SINGULAIR) 10 MG tablet Take 10 mg by mouth daily Take at 2pm (Patient not taking: Reported on 8/12/2024)      triamcinolone (KENALOG) 0.1 % external ointment Apply topically 2 times daily as needed for irritation (to hands) (Patient not taking: Reported on 8/12/2024)      calcitRIOL (ROCALTROL) 0.5 MCG capsule Take 1 capsule (0.5 mcg) by mouth daily 90 capsule 3    SYNTHROID 150 MCG tablet Take 1 tablet (150 mcg) by mouth daily 90 tablet 3     No facility-administered medications prior to visit.

## 2024-09-13 NOTE — LETTER
"9/13/2024      Joan Christianson  [address] [facility] [address]      Dear Colleague,    Thank you for referring your patient, Joan Christianson, to the [facility] [place]. Please see a copy of my visit note below.    [facility] ENDOCRINOLOGY    Thyroid Note  9/13/2024    Joan Christianson, 1975, [id number]          Reason for visit      1. Postoperative hypothyroidism    2. Hypocalcemia        HPI     Joan Christianson is a very pleasant 48 year old old female who presents for follow up.  SUMMARY:    Joan is seen in follow-up for Postoperative Hypothyroidism. She is reporting that she is feeling \"so much better\" after having a Lap Hysterectomy. She sheepishly notes that she was back in the Gym \"before I was supposed to be\". Although she reports that she didn't do anything that might endanger her op area.     Her TSH remains low at 0.01 and fT4 is 1.18, and within normal range. She is taking Synthroid, 150 mcg daily. She is not interested in changing anything because she feels well. She is having no problems referable to her neck.     She remains on Calcitriol for Hypocalcemia. Her last level was seen in April at 8.8. She denies any palpitations or muscle twitching.       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)     Papillary thyroid carcinoma (H)     Postoperative hypothyroidism     Major depressive disorder with single episode, in full remission (H24)     Acne vulgaris     Seasonal allergic rhinitis, unspecified trigger     Migraine without status migrainosus, not intractable, unspecified migraine type     Alcohol abuse, in remission     History of hysterectomy including cervix       Family History       family history includes Breast " Cancer (age of onset: 39.00) in her cousin; Breast Cancer (age of onset: 50.00) in her mother; Cancer in her paternal aunt and other family members; Coronary Artery Disease (age of onset: 70.00) in her paternal grandfather; Lung Cancer (age of onset: 55.00) in her mother; Melanoma in her maternal aunt and maternal grandfather; Pulmonary Embolism (age of onset: 60.00) in her father.    Social History      reports that she quit smoking about 7 years ago. Her smoking use included cigarettes. She started smoking about 28 years ago. She has been exposed to tobacco smoke. She has never used smokeless tobacco. She reports that she does not drink alcohol and does not use drugs.      Review of Systems     Patient denies fatigue, weight changes, heat/cold intolerance, bowel/skin changes or CVS symptoms.   Remainder per HPI and per attached intake form.      Vital Signs     /69 (BP Location: Left arm, Patient Position: Sitting, Cuff Size: Adult Regular)   Pulse 66   Wt 62.4 kg (137 lb 8 oz)   LMP 04/11/2024 (Exact Date)   SpO2 99%   BMI 22.19 kg/m    Wt Readings from Last 3 Encounters:   09/13/24 62.4 kg (137 lb 8 oz)   08/12/24 60.3 kg (133 lb)   05/03/24 63.9 kg (140 lb 14.4 oz)       Physical Exam     General:  Normal, NIRD,appears euthyroid  Eyes:  Pupils equal, round and reactive to light; no proptosis, lid lag or  periorbital edema.  Thyroid:  Thyroid is absent..    Neck: No lymph nodes  Musculoskeletal:  Muscle strength grossly normal without evidence of wasting.  Heart:  Regular rate and rhythm without murmur.  Lungs:  Clear to auscultation.  Abdomen: Soft, non-tender, no masses or organomegaly  Neuro: Patella Reflexes were normal.No tremors  Skin:  No acanthosis nigricans or vitiligo        Assessment     1. Postoperative hypothyroidism    2. Hypocalcemia            Plan     Bio-chemically and physically euthyroid. She will remain on her current dose of Synthroid.     Refilled her Calcitriol.     Follow-up  "with me in 1 year.     Sharron Howard NP  [facility]  9/13/2024  10:29 AM    Lab Results     TSH   Date Value Ref Range Status   09/03/2024 0.01 (L) 0.30 - 4.20 uIU/mL Final   10/11/2022 0.06 (L) 0.30 - 5.00 uIU/mL Final     No components found for: \"THYROIDAB\"    No results found for: \"F7SSIGR\"    Imaging Results   Last thyroid ultrasound:  Results for orders placed during the hospital encounter of 03/21/23    US Thyroid    Narrative  EXAM: US THYROID  LOCATION: [facility]  DATE/TIME: 3/21/2023 7:23 AM    INDICATION: Thyroidectomy for cancer in 2018 with recurrence in 2020. Surveillance.  COMPARISON: 03/18/2022, 03/02/2021  TECHNIQUE: Thyroid ultrasound.    FINDINGS: Study done only by the technologist.    RIGHT lobe: Absent.  LEFT lobe: Absent.    NECK: A superior right cervical lymph node is again noted. This measures 11 x 4 x 3 mm, unchanged in size since 2021. The inferior component on the cine loops appears cystic, also unchanged since last year but a change in appearance since 2021.    Impression  IMPRESSION:  1.  One, small right cervical node is unchanged in size with a suggestion of a cystic component inferiorly as noted above, unchanged since last year but suggestion of a change in texture compared to the 2021 exam.  2.  It is unclear if this node has been sampled or not? Consider FNA if it has not.  3.  No new suspicious nodes.      Nodules are characterized per  ACR Thyroid Imaging, Reporting and Data System (TI-RADS): White Paper of the ACR TI-RADS Committee  Alec Ulloa et al. Journal of the American College of Radiology 2017. Volume 14 (2017), Issue 5, 587-149.      [Access Center: Please see above report regarding right superior cervical node.]    This report will be copied to the [facility] to ensure a provider acknowledges the finding. Access Center is available Monday through Friday 8am-3:30 pm.      Last thyroid nuclear scan:  No results found " for this or any previous visit.      Current Medications     Outpatient Medications Prior to Visit   Medication Sig Dispense Refill     acamprosate (CAMPRAL) 333 MG EC tablet Take 2 tablets (666 mg) by mouth 2 times daily 180 tablet 3     azelastine (ASTELIN) 0.1 % nasal spray Spray 1 spray into both nostrils 2 times daily 30 mL 0     azelastine (OPTIVAR) 0.05 % ophthalmic solution Place 1 drop into both eyes 2 times daily as needed 6 mL 3     doxycycline monohydrate (ADOXA) 100 MG tablet Take 1 tablet (100 mg) by mouth daily 90 tablet 3     fexofenadine (ALLEGRA) 180 MG tablet Take 1 tablet (180 mg) by mouth At Bedtime       magnesium oxide (MAG-OX) 400 MG tablet Take 1 tablet (400 mg) by mouth daily 30 tablet 0     melatonin 3 MG tablet Take 3 mg by mouth At Bedtime       Probiotic Product (PROBIOTIC BLEND) CAPS        sertraline (ZOLOFT) 50 MG tablet Take 1 tablet (50 mg) by mouth daily 90 tablet 2     SUMAtriptan (IMITREX) 50 MG tablet [SUMATRIPTAN (IMITREX) 50 MG TABLET] TAKE 1/2 TABLET (25 MG TOTAL) BY MOUTH EVERY 2 HOURS AS NEEDED FOR MIGRAINE. 10 tablet 2     vitamin B-12 (CYANOCOBALAMIN) 500 MCG tablet Take 500 mcg by mouth daily       montelukast (SINGULAIR) 10 MG tablet Take 10 mg by mouth daily Take at 2pm (Patient not taking: Reported on 8/12/2024)       triamcinolone (KENALOG) 0.1 % external ointment Apply topically 2 times daily as needed for irritation (to hands) (Patient not taking: Reported on 8/12/2024)       calcitRIOL (ROCALTROL) 0.5 MCG capsule Take 1 capsule (0.5 mcg) by mouth daily 90 capsule 3     SYNTHROID 150 MCG tablet Take 1 tablet (150 mcg) by mouth daily 90 tablet 3     No facility-administered medications prior to visit.         Again, thank you for allowing me to participate in the care of your patient.        Sincerely,        Sharron Howard NP

## 2025-02-26 ENCOUNTER — OFFICE VISIT (OUTPATIENT)
Dept: INTERNAL MEDICINE | Facility: CLINIC | Age: 50
End: 2025-02-26
Payer: COMMERCIAL

## 2025-02-26 VITALS
TEMPERATURE: 97.4 F | OXYGEN SATURATION: 100 % | HEART RATE: 64 BPM | HEIGHT: 66 IN | WEIGHT: 134 LBS | SYSTOLIC BLOOD PRESSURE: 120 MMHG | DIASTOLIC BLOOD PRESSURE: 78 MMHG | BODY MASS INDEX: 21.53 KG/M2 | RESPIRATION RATE: 16 BRPM

## 2025-02-26 DIAGNOSIS — J32.9 SINUSITIS, UNSPECIFIED CHRONICITY, UNSPECIFIED LOCATION: Primary | ICD-10-CM

## 2025-02-26 PROCEDURE — 3074F SYST BP LT 130 MM HG: CPT | Performed by: NURSE PRACTITIONER

## 2025-02-26 PROCEDURE — 99213 OFFICE O/P EST LOW 20 MIN: CPT | Performed by: NURSE PRACTITIONER

## 2025-02-26 PROCEDURE — 3078F DIAST BP <80 MM HG: CPT | Performed by: NURSE PRACTITIONER

## 2025-02-26 RX ORDER — CEFDINIR 300 MG/1
300 CAPSULE ORAL 2 TIMES DAILY
Qty: 20 CAPSULE | Refills: 0 | Status: SHIPPED | OUTPATIENT
Start: 2025-02-26 | End: 2025-03-08

## 2025-02-26 RX ORDER — PREDNISONE 10 MG/1
10 TABLET ORAL DAILY
Qty: 30 TABLET | Refills: 0 | Status: SHIPPED | OUTPATIENT
Start: 2025-02-26

## 2025-02-26 ASSESSMENT — PATIENT HEALTH QUESTIONNAIRE - PHQ9
SUM OF ALL RESPONSES TO PHQ QUESTIONS 1-9: 4
SUM OF ALL RESPONSES TO PHQ QUESTIONS 1-9: 4
10. IF YOU CHECKED OFF ANY PROBLEMS, HOW DIFFICULT HAVE THESE PROBLEMS MADE IT FOR YOU TO DO YOUR WORK, TAKE CARE OF THINGS AT HOME, OR GET ALONG WITH OTHER PEOPLE: SOMEWHAT DIFFICULT

## 2025-02-26 NOTE — PATIENT INSTRUCTIONS
Cefdinir antibiotic twice daily for 10 days.    Lets try a prednisone taper.    Try humidified air in the bedroom.    Continue with [place] pot rinses and nasal sprays.

## 2025-02-26 NOTE — PROGRESS NOTES
"  Assessment & Plan     Sinusitis, unspecified chronicity, unspecified location  Persistent sinusitis and nasal congestion for the last month.    The symptoms are reminiscent of an episode she had in 2018.  At that time, she was treated with cefdinir for 10 days and a prednisone taper.  That worked quite well for her.    We will do the same today.    She was cautioned about using NSAIDs with prednisone.  It sounds like she has been doing some Aleve cold and sinus.    Take the prednisone in the morning with food.    Continue with [place] pot nasal rinses and nasal sprays.    Follow-up if symptoms are not starting to improve after 2 weeks    - cefdinir (OMNICEF) 300 MG capsule; Take 1 capsule (300 mg) by mouth 2 times daily for 10 days.  - predniSONE (DELTASONE) 10 MG tablet; Take 1 tablet (10 mg) by mouth daily.    Patient Instructions   Cefdinir antibiotic twice daily for 10 days.    Lets try a prednisone taper.    Try humidified air in the bedroom.    Continue with [place] pot rinses and nasal sprays.              Jim Franco is a 49 year old, presenting for the following health issues:  URI (X 1 month, still having sinus congestion. Has tried allegra and prescribed allergy medication, aleve cold and sinus, eliza pot with no relief. No pain)      2/26/2025     9:10 AM   Additional Questions   Roomed by      URI    History of Present Illness       Reason for visit:  Congestion and cough   She is taking medications regularly.       Persistent nasal congestion, sinus pain and pressure.  Symptoms started after her and her son had acute onset of viral URI symptoms about a month ago.    No respiratory distress.  Mild cough intermittently.    Has been taking some OTC cold and flu medications for the cough without much success      Objective    /78 (BP Location: Right arm, Patient Position: Sitting, Cuff Size: Adult Regular)   Pulse 64   Temp 97.4  F (36.3  C) (Oral)   Resp 16   Ht 1.676 m (5' 6\")   Wt " 60.8 kg (134 lb)   LMP 04/11/2024 (Exact Date)   SpO2 100%   Breastfeeding No   BMI 21.63 kg/m    Body mass index is 21.63 kg/m .  Physical Exam   Mild sinus tenderness with palpation  No cervical adenopathy appreciated.  Lungs are clear to auscultation bilaterally            Signed Electronically by: Flaquito Melendez, CNP

## 2025-03-17 ENCOUNTER — LAB (OUTPATIENT)
Dept: LAB | Facility: CLINIC | Age: 50
End: 2025-03-17
Payer: COMMERCIAL

## 2025-03-17 DIAGNOSIS — Z85.850 HISTORY OF THYROID CANCER: ICD-10-CM

## 2025-03-17 DIAGNOSIS — E89.0 POSTOPERATIVE HYPOTHYROIDISM: ICD-10-CM

## 2025-03-17 LAB
HOLD SPECIMEN: NORMAL

## 2025-03-17 PROCEDURE — 84443 ASSAY THYROID STIM HORMONE: CPT

## 2025-03-17 PROCEDURE — 36415 COLL VENOUS BLD VENIPUNCTURE: CPT

## 2025-03-17 PROCEDURE — 82306 VITAMIN D 25 HYDROXY: CPT

## 2025-03-17 PROCEDURE — 84481 FREE ASSAY (FT-3): CPT

## 2025-03-17 PROCEDURE — 84439 ASSAY OF FREE THYROXINE: CPT

## 2025-03-18 LAB
T3FREE SERPL-MCNC: 2.3 PG/ML (ref 2–4.4)
T4 FREE SERPL-MCNC: 1.13 NG/DL (ref 0.9–1.7)
TSH SERPL DL<=0.005 MIU/L-ACNC: 0.03 UIU/ML (ref 0.3–4.2)
VIT D+METAB SERPL-MCNC: 27 NG/ML (ref 20–50)

## 2025-03-20 ENCOUNTER — OFFICE VISIT (OUTPATIENT)
Dept: ENDOCRINOLOGY | Facility: CLINIC | Age: 50
End: 2025-03-20
Payer: COMMERCIAL

## 2025-03-20 VITALS — SYSTOLIC BLOOD PRESSURE: 122 MMHG | OXYGEN SATURATION: 100 % | HEART RATE: 57 BPM | DIASTOLIC BLOOD PRESSURE: 76 MMHG

## 2025-03-20 DIAGNOSIS — E89.0 POSTOPERATIVE HYPOTHYROIDISM: Primary | ICD-10-CM

## 2025-03-20 DIAGNOSIS — Z85.850 HISTORY OF THYROID CANCER: ICD-10-CM

## 2025-03-20 NOTE — LETTER
3/20/2025      Joan Christianson  [address] [facility] [address]      Dear Colleague,    Thank you for referring your patient, Joan Christianson, to the [facility] [place]. Please see a copy of my visit note below.    [facility] ENDOCRINOLOGY    Thyroid Note  3/20/2025    Joan Christianson, 1975, [id number]          Reason for visit      1. Postoperative hypothyroidism    2. History of thyroid cancer        HPI     Joan Christianson is a very pleasant 49 year old old female who presents for follow up.  SUMMARY:    Joan returns today in follow-up for postoperative hypothyroidism. She reports that she is feeling well, and that it is now 5 years post dx and treatment. Current TSH is 0.03, fT4 is 1.13 and fT3 is 2.3. She remains on Synthroid, 150 mcg daily. She is having no problems referable to her neck.     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)     Papillary thyroid carcinoma (H)     Postoperative hypothyroidism     Major depressive disorder with single episode, in full remission     Acne vulgaris     Seasonal allergic rhinitis, unspecified trigger     Migraine without status migrainosus, not intractable, unspecified migraine type     Alcohol abuse, in remission     History of hysterectomy including cervix       Family History       family history includes Breast Cancer (age of onset: 39.00) in her cousin; Breast Cancer (age of onset: 50.00) in her mother; Cancer in her paternal aunt and other family members; Coronary Artery Disease (age of onset: 70.00) in her paternal grandfather; Lung Cancer (age of onset: 55.00) in her mother; Melanoma in her maternal aunt and maternal grandfather; Pulmonary Embolism (age of onset: 60.00) in her  "father.    Social History      reports that she quit smoking about 8 years ago. Her smoking use included cigarettes. She started smoking about 28 years ago. She has been exposed to tobacco smoke. She has never used smokeless tobacco. She reports that she does not drink alcohol and does not use drugs.      Review of Systems     Patient denies fatigue, weight changes, heat/cold intolerance, bowel/skin changes or CVS symptoms.   Remainder per HPI and per attached intake form.      Vital Signs     /76   Pulse 57   LMP 04/11/2024 (Exact Date)   SpO2 100%   Wt Readings from Last 3 Encounters:   02/26/25 60.8 kg (134 lb)   09/13/24 62.4 kg (137 lb 8 oz)   08/12/24 60.3 kg (133 lb)       Physical Exam     General:  Normal, NIRD,appears euthyroid  Eyes:  Pupils equal, round and reactive to light; no proptosis, lid lag or  periorbital edema.  Thyroid:  Thyroid is absent.    Neck: No lymph nodes  Musculoskeletal:  Muscle strength grossly normal without evidence of wasting.  Heart:  Regular rate and rhythm without murmur.  Lungs:  Clear to auscultation.  Abdomen: Soft, non-tender, no masses or organomegaly  Neuro: Patella Reflexes were normal.No tremors  Skin:  No acanthosis nigricans or vitiligo        Assessment     1. Postoperative hypothyroidism    2. History of thyroid cancer            Plan       She is bio-chemically and physically euthyroid. She will remain on her current dose of Synthroid and will follow-up with me in 1 year.       Sharron Howard NP  [facility]  3/20/2025  7:21 AM          This note has been dictated using voice recognition software.  Any grammatical or context distortions are unintentional and inherent to the software.     Lab Results     TSH   Date Value Ref Range Status   03/17/2025 0.03 (L) 0.30 - 4.20 uIU/mL Final   10/11/2022 0.06 (L) 0.30 - 5.00 uIU/mL Final     No components found for: \"THYROIDAB\"    No results found for: \"S6POOYK\"    Imaging Results   Last thyroid " ultrasound:  Results for orders placed during the hospital encounter of 03/21/23    US Thyroid    Narrative  EXAM: US THYROID  LOCATION: [facility]  DATE/TIME: 3/21/2023 7:23 AM    INDICATION: Thyroidectomy for cancer in 2018 with recurrence in 2020. Surveillance.  COMPARISON: 03/18/2022, 03/02/2021  TECHNIQUE: Thyroid ultrasound.    FINDINGS: Study done only by the technologist.    RIGHT lobe: Absent.  LEFT lobe: Absent.    NECK: A superior right cervical lymph node is again noted. This measures 11 x 4 x 3 mm, unchanged in size since 2021. The inferior component on the cine loops appears cystic, also unchanged since last year but a change in appearance since 2021.    Impression  IMPRESSION:  1.  One, small right cervical node is unchanged in size with a suggestion of a cystic component inferiorly as noted above, unchanged since last year but suggestion of a change in texture compared to the 2021 exam.  2.  It is unclear if this node has been sampled or not? Consider FNA if it has not.  3.  No new suspicious nodes.      Nodules are characterized per  ACR Thyroid Imaging, Reporting and Data System (TI-RADS): White Paper of the ACR TI-RADS Committee  Alec Ulloa et al. Journal of the American College of Radiology 2017. Volume 14 (2017), Issue 5, 587-565.      [Access Center: Please see above report regarding right superior cervical node.]    This report will be copied to the [place] to ensure a provider acknowledges the finding. Access Center is available Monday through Friday 8am-3:30 pm.      Last thyroid nuclear scan:  No results found for this or any previous visit.      Current Medications     Outpatient Medications Prior to Visit   Medication Sig Dispense Refill     acamprosate (CAMPRAL) 333 MG EC tablet Take 2 tablets (666 mg) by mouth 2 times daily 180 tablet 3     azelastine (ASTELIN) 0.1 % nasal spray Spray 1 spray into both nostrils 2 times daily 30 mL 0      azelastine (OPTIVAR) 0.05 % ophthalmic solution Place 1 drop into both eyes 2 times daily as needed 6 mL 3     calcitRIOL (ROCALTROL) 0.5 MCG capsule Take 1 capsule (0.5 mcg) by mouth daily. 90 capsule 3     doxycycline monohydrate (ADOXA) 100 MG tablet Take 1 tablet (100 mg) by mouth daily 90 tablet 3     fexofenadine (ALLEGRA) 180 MG tablet Take 1 tablet (180 mg) by mouth At Bedtime       melatonin 3 MG tablet Take 3 mg by mouth At Bedtime       Probiotic Product (PROBIOTIC BLEND) CAPS        sertraline (ZOLOFT) 50 MG tablet Take 1 tablet (50 mg) by mouth daily 90 tablet 2     SUMAtriptan (IMITREX) 50 MG tablet [SUMATRIPTAN (IMITREX) 50 MG TABLET] TAKE 1/2 TABLET (25 MG TOTAL) BY MOUTH EVERY 2 HOURS AS NEEDED FOR MIGRAINE. 10 tablet 2     SYNTHROID 150 MCG tablet Take 1 tablet (150 mcg) by mouth daily. 90 tablet 3     triamcinolone (KENALOG) 0.1 % external ointment Apply topically 2 times daily as needed for irritation (to hands).       vitamin B-12 (CYANOCOBALAMIN) 500 MCG tablet Take 500 mcg by mouth daily       magnesium oxide (MAG-OX) 400 MG tablet Take 1 tablet (400 mg) by mouth daily 30 tablet 0     montelukast (SINGULAIR) 10 MG tablet Take 10 mg by mouth daily. Take at 2pm (Patient not taking: Reported on 2/26/2025)       predniSONE (DELTASONE) 10 MG tablet Take 1 tablet (10 mg) by mouth daily. 30 tablet 0     No facility-administered medications prior to visit.         Again, thank you for allowing me to participate in the care of your patient.        Sincerely,        Sharron Howard NP    Electronically signed

## 2025-03-20 NOTE — PROGRESS NOTES
[facility] ENDOCRINOLOGY    Thyroid Note  3/20/2025    Joan Christianson, 1975, [id number]          Reason for visit      1. Postoperative hypothyroidism    2. History of thyroid cancer        HPI     Joan Christianson is a very pleasant 49 year old old female who presents for follow up.  SUMMARY:    Joan returns today in follow-up for postoperative hypothyroidism. She reports that she is feeling well, and that it is now 5 years post dx and treatment. Current TSH is 0.03, fT4 is 1.13 and fT3 is 2.3. She remains on Synthroid, 150 mcg daily. She is having no problems referable to her neck.     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)    Papillary thyroid carcinoma (H)    Postoperative hypothyroidism    Major depressive disorder with single episode, in full remission    Acne vulgaris    Seasonal allergic rhinitis, unspecified trigger    Migraine without status migrainosus, not intractable, unspecified migraine type    Alcohol abuse, in remission    History of hysterectomy including cervix       Family History       family history includes Breast Cancer (age of onset: 39.00) in her cousin; Breast Cancer (age of onset: 50.00) in her mother; Cancer in her paternal aunt and other family members; Coronary Artery Disease (age of onset: 70.00) in her paternal grandfather; Lung Cancer (age of onset: 55.00) in her mother; Melanoma in her maternal aunt and maternal grandfather; Pulmonary Embolism (age of onset: 60.00) in her father.    Social History      reports that she quit smoking about 8 years ago. Her smoking use included cigarettes. She started smoking about 28 years ago. She has been exposed to tobacco smoke. She has never used smokeless tobacco. She reports that she does not drink  "alcohol and does not use drugs.      Review of Systems     Patient denies fatigue, weight changes, heat/cold intolerance, bowel/skin changes or CVS symptoms.   Remainder per HPI and per attached intake form.      Vital Signs     /76   Pulse 57   LMP 04/11/2024 (Exact Date)   SpO2 100%   Wt Readings from Last 3 Encounters:   02/26/25 60.8 kg (134 lb)   09/13/24 62.4 kg (137 lb 8 oz)   08/12/24 60.3 kg (133 lb)       Physical Exam     General:  Normal, NIRD,appears euthyroid  Eyes:  Pupils equal, round and reactive to light; no proptosis, lid lag or  periorbital edema.  Thyroid:  Thyroid is absent.    Neck: No lymph nodes  Musculoskeletal:  Muscle strength grossly normal without evidence of wasting.  Heart:  Regular rate and rhythm without murmur.  Lungs:  Clear to auscultation.  Abdomen: Soft, non-tender, no masses or organomegaly  Neuro: Patella Reflexes were normal.No tremors  Skin:  No acanthosis nigricans or vitiligo        Assessment     1. Postoperative hypothyroidism    2. History of thyroid cancer            Plan       She is bio-chemically and physically euthyroid. She will remain on her current dose of Synthroid and will follow-up with me in 1 year.       Sharron Howard NP  [facility]  3/20/2025  7:21 AM          This note has been dictated using voice recognition software.  Any grammatical or context distortions are unintentional and inherent to the software.     Lab Results     TSH   Date Value Ref Range Status   03/17/2025 0.03 (L) 0.30 - 4.20 uIU/mL Final   10/11/2022 0.06 (L) 0.30 - 5.00 uIU/mL Final     No components found for: \"THYROIDAB\"    No results found for: \"H1ZEIDX\"    Imaging Results   Last thyroid ultrasound:  Results for orders placed during the hospital encounter of 03/21/23    US Thyroid    Narrative  EXAM: US THYROID  LOCATION: [facility]  DATE/TIME: 3/21/2023 7:23 AM    INDICATION: Thyroidectomy for cancer in 2018 with recurrence in 2020. " Surveillance.  COMPARISON: 03/18/2022, 03/02/2021  TECHNIQUE: Thyroid ultrasound.    FINDINGS: Study done only by the technologist.    RIGHT lobe: Absent.  LEFT lobe: Absent.    NECK: A superior right cervical lymph node is again noted. This measures 11 x 4 x 3 mm, unchanged in size since 2021. The inferior component on the cine loops appears cystic, also unchanged since last year but a change in appearance since 2021.    Impression  IMPRESSION:  1.  One, small right cervical node is unchanged in size with a suggestion of a cystic component inferiorly as noted above, unchanged since last year but suggestion of a change in texture compared to the 2021 exam.  2.  It is unclear if this node has been sampled or not? Consider FNA if it has not.  3.  No new suspicious nodes.      Nodules are characterized per  ACR Thyroid Imaging, Reporting and Data System (TI-RADS): White Paper of the ACR TI-RADS Committee  Alec Ulloa et al. Journal of the American College of Radiology 2017. Volume 14 (2017), Issue 5, 492-487.      [Access Center: Please see above report regarding right superior cervical node.]    This report will be copied to the [place] [facility] to ensure a provider acknowledges the finding. Access Center is available Monday through Friday 8am-3:30 pm.      Last thyroid nuclear scan:  No results found for this or any previous visit.      Current Medications     Outpatient Medications Prior to Visit   Medication Sig Dispense Refill    acamprosate (CAMPRAL) 333 MG EC tablet Take 2 tablets (666 mg) by mouth 2 times daily 180 tablet 3    azelastine (ASTELIN) 0.1 % nasal spray Spray 1 spray into both nostrils 2 times daily 30 mL 0    azelastine (OPTIVAR) 0.05 % ophthalmic solution Place 1 drop into both eyes 2 times daily as needed 6 mL 3    calcitRIOL (ROCALTROL) 0.5 MCG capsule Take 1 capsule (0.5 mcg) by mouth daily. 90 capsule 3    doxycycline monohydrate (ADOXA) 100 MG tablet Take 1 tablet (100 mg) by  mouth daily 90 tablet 3    fexofenadine (ALLEGRA) 180 MG tablet Take 1 tablet (180 mg) by mouth At Bedtime      melatonin 3 MG tablet Take 3 mg by mouth At Bedtime      Probiotic Product (PROBIOTIC BLEND) CAPS       sertraline (ZOLOFT) 50 MG tablet Take 1 tablet (50 mg) by mouth daily 90 tablet 2    SUMAtriptan (IMITREX) 50 MG tablet [SUMATRIPTAN (IMITREX) 50 MG TABLET] TAKE 1/2 TABLET (25 MG TOTAL) BY MOUTH EVERY 2 HOURS AS NEEDED FOR MIGRAINE. 10 tablet 2    SYNTHROID 150 MCG tablet Take 1 tablet (150 mcg) by mouth daily. 90 tablet 3    triamcinolone (KENALOG) 0.1 % external ointment Apply topically 2 times daily as needed for irritation (to hands).      vitamin B-12 (CYANOCOBALAMIN) 500 MCG tablet Take 500 mcg by mouth daily      magnesium oxide (MAG-OX) 400 MG tablet Take 1 tablet (400 mg) by mouth daily 30 tablet 0    montelukast (SINGULAIR) 10 MG tablet Take 10 mg by mouth daily. Take at 2pm (Patient not taking: Reported on 2/26/2025)      predniSONE (DELTASONE) 10 MG tablet Take 1 tablet (10 mg) by mouth daily. 30 tablet 0     No facility-administered medications prior to visit.

## 2025-03-22 DIAGNOSIS — F10.230 ALCOHOL DEPENDENCE WITH UNCOMPLICATED WITHDRAWAL (H): ICD-10-CM

## 2025-03-22 DIAGNOSIS — Z76.0 ENCOUNTER FOR MEDICATION REFILL: ICD-10-CM

## 2025-03-23 RX ORDER — ACAMPROSATE CALCIUM 333 MG/1
666 TABLET, DELAYED RELEASE ORAL 2 TIMES DAILY
Qty: 360 TABLET | Refills: 1 | Status: SHIPPED | OUTPATIENT
Start: 2025-03-23

## 2025-04-01 LAB — THYROGLOB SERPL-MCNC: 0.5 NG/ML

## 2025-04-19 ENCOUNTER — HEALTH MAINTENANCE LETTER (OUTPATIENT)
Age: 50
End: 2025-04-19

## 2025-07-20 DIAGNOSIS — Z76.0 ENCOUNTER FOR MEDICATION REFILL: ICD-10-CM

## 2025-07-20 DIAGNOSIS — E89.0 POSTOPERATIVE HYPOTHYROIDISM: ICD-10-CM

## 2025-07-21 RX ORDER — LEVOTHYROXINE SODIUM 150 MCG
150 TABLET ORAL DAILY
Qty: 90 TABLET | Refills: 1 | Status: SHIPPED | OUTPATIENT
Start: 2025-07-21

## 2025-07-21 NOTE — TELEPHONE ENCOUNTER
Requested Prescriptions   Pending Prescriptions Disp Refills    SYNTHROID 150 MCG tablet [Pharmacy Med Name: SYNTHROID 150 MCG TABLET] 90 tablet 3     Sig: TAKE 1 TABLET BY MOUTH EVERY DAY       Thyroid Protocol Failed - 7/21/2025 12:57 PM        Failed - Normal TSH on file in past 12 months     Recent Labs   Lab Test 03/17/25  0735   TSH 0.03*              Passed - Patient is 12 years or older        Passed - Medication is active on med list and the sig matches. RN to manually verify dose and sig if red X/fail.     If the protocol passes (green check), you do not need to verify med dose and sig.    A prescription matches if they are the same clinical intention.    For Example: once daily and every morning are the same.    The protocol can not identify upper and lower case letters as matching and will fail.     For Example: Take 1 tablet (50 mg) by mouth daily     TAKE 1 TABLET (50 MG) BY MOUTH DAILY    For all fails (red x), verify dose and sig.    If the refill does match what is on file, the RN can still proceed to approve the refill request.       If they do not match, route to the appropriate provider.             Passed - Recent (12 month) or future (90 days) visit with authorizing provider's specialty (provided they have been seen in the past 15 months)     The patient must have completed an in-person or virtual visit within the past 12 months or has a future visit scheduled within the next 90 days with the authorizing provider s specialty.  Urgent care and e-visits do not qualify as an office visit for this protocol.          Passed - Medication indicated for associated diagnosis     Medication is associated with one or more of the following diagnoses:  Hypothyroidism  Thyroid stimulating hormone suppression therapy  Thyroid cancer  Acquired atrophy of thyroid          Passed - No active pregnancy on record     If patient is pregnant or has had a positive pregnancy test, please check TSH.          Passed -  No positive pregnancy test in past 12 months     If patient is pregnant or has had a positive pregnancy test, please check TSH.

## 2025-08-12 DIAGNOSIS — E89.0 POSTOPERATIVE HYPOTHYROIDISM: ICD-10-CM

## 2025-08-12 DIAGNOSIS — Z85.850 HISTORY OF THYROID CANCER: Primary | ICD-10-CM

## 2025-08-13 DIAGNOSIS — Z76.0 ENCOUNTER FOR MEDICATION REFILL: ICD-10-CM

## 2025-08-15 PROBLEM — C77.9 MALIGNANT NEOPLASM METASTATIC TO LYMPH NODES (H): Status: RESOLVED | Noted: 2022-02-10 | Resolved: 2025-08-15

## 2025-08-15 PROBLEM — C73 PAPILLARY THYROID CARCINOMA (H): Status: RESOLVED | Noted: 2018-12-01 | Resolved: 2025-08-15

## 2025-08-19 ENCOUNTER — HOSPITAL ENCOUNTER (OUTPATIENT)
Dept: MAMMOGRAPHY | Facility: CLINIC | Age: 50
Discharge: HOME | End: 2025-08-19
Attending: NURSE PRACTITIONER
Payer: COMMERCIAL

## 2025-08-19 DIAGNOSIS — Z12.31 VISIT FOR SCREENING MAMMOGRAM: ICD-10-CM

## 2025-08-19 PROCEDURE — 77063 BREAST TOMOSYNTHESIS BI: CPT

## (undated) DEVICE — DAVINCI HOT SHEARS TIP COVER  400180

## (undated) DEVICE — SU WND CLOSURE VLOC 180 ABS 0 12" GS-21 VLOCL0316

## (undated) DEVICE — SU MONOCRYL+ 4-0 18IN PS2 UND MCP496G

## (undated) DEVICE — TROCAR PORT BLADELESS 5X100MM IAS5-100LP

## (undated) DEVICE — PACK TRENGUARD 450 PROCEDURAL 2065406

## (undated) DEVICE — PROTECTOR ARM STANDARD ONE STEP

## (undated) DEVICE — DECANTER VIAL 2006S

## (undated) DEVICE — GLOVE BIOGEL PI ULTRATOUCH G SZ 6.5 42165

## (undated) DEVICE — NEEDLE HYPO MAGELLAN SAFETY 22GA 1 1/2IN 8881850215

## (undated) DEVICE — PREP CHLORAPREP 26ML TINTED HI-LITE ORANGE 930815

## (undated) DEVICE — TUBING FILTER TRI-LUMEN AIRSEAL ASC-EVAC1

## (undated) DEVICE — CATH FOLEY 16FR 5ML LUBRICATH LATEX 0165L16

## (undated) DEVICE — GLOVE SURGEON PI ORTHO SZ 6-1/2 LF

## (undated) DEVICE — DAVINCI XI SEAL UNIVERSAL 5-12MM 470500

## (undated) DEVICE — STRAP CATH LEG ADJUSTABLE 0814-8200

## (undated) DEVICE — PAD POS XL 1X20X40IN PINK PIGAZZI

## (undated) DEVICE — GLOVE BIOGEL PI SZ 8.0 40880

## (undated) DEVICE — LUBRICANT INST ELECTROLUBE EL101

## (undated) DEVICE — NDL INSUFFLATION 13GA 120MM C2201

## (undated) DEVICE — SYR 50ML SLIP TIP W/O NDL 309654

## (undated) DEVICE — ANTIFOG SOLUTION SEE SHARP 150M TROCAR SWABS 30978

## (undated) DEVICE — SUCTION STRYKERFLOW II 250-070-500

## (undated) DEVICE — DAVINCI XI DRAPE COLUMN 470341

## (undated) DEVICE — SUCTION MANIFOLD NEPTUNE 2 SYS 1 PORT 702-025-000

## (undated) DEVICE — ENDO SHEARS RENEW LAP ENDOCUT SCISSOR TIP 16.5MM 3142

## (undated) DEVICE — PREP POVIDONE-IODINE 7.5% SCRUB 4OZ BOTTLE MDS093945

## (undated) DEVICE — SYR 10ML LL W/O NDL 302995

## (undated) DEVICE — TUBING IRRIG TUR Y TYPE 96" LF 6543-01

## (undated) DEVICE — MAT FLOOR SURGICAL 40X38 0702140238

## (undated) DEVICE — DRAPE U SPLIT 74X120" 29440

## (undated) DEVICE — DAVINCI XI OBTURATOR BLADELESS 8MM 470359

## (undated) DEVICE — GLOVE UNDER INDICATOR PI SZ 7.0 LF 41670

## (undated) DEVICE — SYR 50ML LL W/O NDL 309653

## (undated) DEVICE — DAVINCI XI DRAPE ARM 470015

## (undated) DEVICE — DRAPE SHEET TABLE COVER KC 42301*

## (undated) DEVICE — SOL WATER IRRIG 1000ML BOTTLE 2F7114

## (undated) DEVICE — SOLUTION IV 2B0304X STRL WATER 1000ML

## (undated) DEVICE — PREP POVIDONE-IODINE 10% SOLUTION 4OZ BOTTLE MDS093944

## (undated) DEVICE — BLADE KNIFE SURG 11 371111

## (undated) DEVICE — SURGICEL POWDER ABSORBABLE HEMOSTAT 3GM 3013SP

## (undated) DEVICE — DRAPE SHEET HALF 40X60" 9358

## (undated) DEVICE — SU DERMABOND ADVANCED .7ML DNX12

## (undated) DEVICE — CUSTOM PACK DA VINCI GYN SMA5BDVHEA

## (undated) DEVICE — RETR ELEV / UTERINE MANIPULATOR V-CARE MED CUP 60-6085-201A

## (undated) RX ORDER — DEXAMETHASONE SODIUM PHOSPHATE 10 MG/ML
INJECTION, SOLUTION INTRAMUSCULAR; INTRAVENOUS
Status: DISPENSED
Start: 2024-05-03

## (undated) RX ORDER — EPHEDRINE SULFATE 50 MG/ML
INJECTION, SOLUTION INTRAMUSCULAR; INTRAVENOUS; SUBCUTANEOUS
Status: DISPENSED
Start: 2024-05-03

## (undated) RX ORDER — ONDANSETRON 2 MG/ML
INJECTION INTRAMUSCULAR; INTRAVENOUS
Status: DISPENSED
Start: 2024-05-03

## (undated) RX ORDER — PROPOFOL 10 MG/ML
INJECTION, EMULSION INTRAVENOUS
Status: DISPENSED
Start: 2024-05-03

## (undated) RX ORDER — LIDOCAINE HYDROCHLORIDE 10 MG/ML
INJECTION, SOLUTION EPIDURAL; INFILTRATION; INTRACAUDAL; PERINEURAL
Status: DISPENSED
Start: 2024-05-03

## (undated) RX ORDER — FENTANYL CITRATE 50 UG/ML
INJECTION, SOLUTION INTRAMUSCULAR; INTRAVENOUS
Status: DISPENSED
Start: 2024-05-03

## (undated) RX ORDER — BUPIVACAINE HYDROCHLORIDE 2.5 MG/ML
INJECTION, SOLUTION EPIDURAL; INFILTRATION; INTRACAUDAL
Status: DISPENSED
Start: 2024-05-03

## (undated) RX ORDER — KETOROLAC TROMETHAMINE 30 MG/ML
INJECTION, SOLUTION INTRAMUSCULAR; INTRAVENOUS
Status: DISPENSED
Start: 2024-05-03